# Patient Record
Sex: FEMALE | Race: WHITE | NOT HISPANIC OR LATINO | URBAN - METROPOLITAN AREA
[De-identification: names, ages, dates, MRNs, and addresses within clinical notes are randomized per-mention and may not be internally consistent; named-entity substitution may affect disease eponyms.]

---

## 2019-09-08 ENCOUNTER — EMERGENCY (EMERGENCY)
Facility: HOSPITAL | Age: 49
LOS: 1 days | Discharge: HOME | End: 2019-09-08
Attending: EMERGENCY MEDICINE | Admitting: EMERGENCY MEDICINE
Payer: COMMERCIAL

## 2019-09-08 VITALS
SYSTOLIC BLOOD PRESSURE: 139 MMHG | DIASTOLIC BLOOD PRESSURE: 97 MMHG | TEMPERATURE: 99 F | RESPIRATION RATE: 18 BRPM | HEART RATE: 91 BPM | OXYGEN SATURATION: 100 %

## 2019-09-08 DIAGNOSIS — O34.21 MATERNAL CARE FOR SCAR FROM PREVIOUS CESAREAN DELIVERY: Chronic | ICD-10-CM

## 2019-09-08 PROCEDURE — 99282 EMERGENCY DEPT VISIT SF MDM: CPT

## 2019-09-08 NOTE — ED PROVIDER NOTE - NSFOLLOWUPINSTRUCTIONS_ED_ALL_ED_FT
Thank you for visiting our Emergency Department, it has been a pleasure taking part in your healthcare. Please follow up with your primary doctor within x48 hours.    Your discharge diagnosis is: viral conjunctivitis     Please follow-up with your primary care doctor.     Return precautions to the Emergency Department include but are not limited to: unrelenting nausea, vomiting, fever, chills, chest pain, shortness of breath, dizziness, abdominal pain, worsening pain, syncope, blood in urine or stool, headache that doesn't resolve, numbness or tingling, loss of sensation, loss of motor function, or any other concerning symptoms.

## 2019-09-08 NOTE — ED ADULT TRIAGE NOTE - CHIEF COMPLAINT QUOTE
eye pain    pt did some gardening on Thursday.... since then having pain alternating from each eye, photophobia but vision intact.  nose running, eyes watery.

## 2019-09-08 NOTE — ED PROVIDER NOTE - PATIENT PORTAL LINK FT
You can access the FollowMyHealth Patient Portal offered by [facility] by registering at the following website: [email or web address]. By joining [facility]’s FollowMyHealth portal, you will also be able to view your health information using other applications (apps) compatible with our system.

## 2019-09-08 NOTE — ED PROVIDER NOTE - CLINICAL SUMMARY MEDICAL DECISION MAKING FREE TEXT BOX
43F, p.w b/l eye pain and photophobia for two days w. associated rhinorrhea and watery eye discharge. neg woods lamp exam, denied visions changes, fever, pain w. EMOI. likely viral conjunctivitis will follow-up with outpatient. Roger att: 44 yo F p/w b/l eye pain and photophobia for two days w. associated rhinorrhea and watery eye discharge. neg woods lamp exam, denied visions changes, fever, pain w. EMOI. likely viral conjunctivitis will follow-up with outpatient.

## 2019-09-08 NOTE — ED ADULT NURSE NOTE - OBJECTIVE STATEMENT
Receive pt. in Intake room 13 alert and oriented x 4 presenting to the Er complaining of redness and secretions in both eyes. Pt. has a history of HTN and stated " I was doing some gardening and cleaning up my daughter's grave on Thursday and my eyes started watering and burning then it became worse today'. No c/o pain, eyes with redness and clear yellow secretions, able to see without difficulty, will continue to monitor.

## 2019-09-08 NOTE — ED PROVIDER NOTE - NS ED ROS FT
GENERAL: No fever or chills, weight changes, nightsweats  EYES: HPI, no change in vision  HEENT: no dysplasia, odynophagia, ear pain, epistasis   CARDIAC: no chest pain, palpitation   PULMONARY: no cough or SOB  GI: no abdominal pain, no nausea or no vomiting, no diarrhea or constipation  : No changes in urination for pain/freq.   SKIN: no rashes   NEURO: no headache, numbness/tingling, extremity weakness   MSK: No joint pain

## 2019-09-08 NOTE — ED PROVIDER NOTE - PROGRESS NOTE DETAILS
Roberto Glynn, PGY 2: patient symptoms improved after Roberto Glynn, PGY 2: patient symptoms improved after tetracaine

## 2019-09-08 NOTE — ED PROVIDER NOTE - PHYSICAL EXAMINATION
General: NAD, good hygiene, well developed  HENT: Atraumatic, EMOI, b/l conjunctivae injection, neg for foreign body on wood lamp, moist mucosa, no post. oropharynx erythema, exudates  Cardiovascular: RRR, S1&2, no M or R, radial pulses equal and b/l  Respiratory: CTABL, no wheezes or crackles, no decreased breath sounds  Abdominal: soft and non-tender non distended, neg for guarding, mckinney's sign, rovsing's sign, mcburney's sign, no CVA tenderness   Extremities: no edema of the legs/feet  Skin: warm, well perfused  Neurologic: nonfocal, AAOx3  Psych: normal mood and affect

## 2019-09-12 ENCOUNTER — EMERGENCY (EMERGENCY)
Facility: HOSPITAL | Age: 49
LOS: 1 days | Discharge: HOME | End: 2019-09-12
Attending: EMERGENCY MEDICINE | Admitting: EMERGENCY MEDICINE
Payer: COMMERCIAL

## 2019-09-12 VITALS
SYSTOLIC BLOOD PRESSURE: 133 MMHG | DIASTOLIC BLOOD PRESSURE: 80 MMHG | HEART RATE: 88 BPM | RESPIRATION RATE: 18 BRPM | TEMPERATURE: 98 F | OXYGEN SATURATION: 100 %

## 2019-09-12 VITALS
TEMPERATURE: 99 F | HEART RATE: 98 BPM | SYSTOLIC BLOOD PRESSURE: 133 MMHG | DIASTOLIC BLOOD PRESSURE: 85 MMHG | OXYGEN SATURATION: 100 % | RESPIRATION RATE: 17 BRPM

## 2019-09-12 DIAGNOSIS — O34.21 MATERNAL CARE FOR SCAR FROM PREVIOUS CESAREAN DELIVERY: Chronic | ICD-10-CM

## 2019-09-12 LAB
ANION GAP SERPL CALC-SCNC: 14 MMO/L — SIGNIFICANT CHANGE UP (ref 7–14)
BUN SERPL-MCNC: 9 MG/DL — SIGNIFICANT CHANGE UP (ref 7–23)
CALCIUM SERPL-MCNC: 8.8 MG/DL — SIGNIFICANT CHANGE UP (ref 8.4–10.5)
CHLORIDE SERPL-SCNC: 103 MMOL/L — SIGNIFICANT CHANGE UP (ref 98–107)
CO2 SERPL-SCNC: 23 MMOL/L — SIGNIFICANT CHANGE UP (ref 22–31)
CREAT SERPL-MCNC: 1.11 MG/DL — SIGNIFICANT CHANGE UP (ref 0.5–1.3)
GLUCOSE SERPL-MCNC: 113 MG/DL — HIGH (ref 70–99)
HCT VFR BLD CALC: 31.7 % — LOW (ref 34.5–45)
HGB BLD-MCNC: 10.1 G/DL — LOW (ref 11.5–15.5)
MCHC RBC-ENTMCNC: 27 PG — SIGNIFICANT CHANGE UP (ref 27–34)
MCHC RBC-ENTMCNC: 31.9 % — LOW (ref 32–36)
MCV RBC AUTO: 84.8 FL — SIGNIFICANT CHANGE UP (ref 80–100)
NRBC # FLD: 0 K/UL — SIGNIFICANT CHANGE UP (ref 0–0)
PLATELET # BLD AUTO: 278 K/UL — SIGNIFICANT CHANGE UP (ref 150–400)
PMV BLD: 11.4 FL — SIGNIFICANT CHANGE UP (ref 7–13)
POTASSIUM SERPL-MCNC: 3.9 MMOL/L — SIGNIFICANT CHANGE UP (ref 3.5–5.3)
POTASSIUM SERPL-SCNC: 3.9 MMOL/L — SIGNIFICANT CHANGE UP (ref 3.5–5.3)
RBC # BLD: 3.74 M/UL — LOW (ref 3.8–5.2)
RBC # FLD: 14.6 % — HIGH (ref 10.3–14.5)
SODIUM SERPL-SCNC: 140 MMOL/L — SIGNIFICANT CHANGE UP (ref 135–145)
WBC # BLD: 10.98 K/UL — HIGH (ref 3.8–10.5)
WBC # FLD AUTO: 10.98 K/UL — HIGH (ref 3.8–10.5)

## 2019-09-12 PROCEDURE — 99284 EMERGENCY DEPT VISIT MOD MDM: CPT

## 2019-09-12 RX ORDER — METOCLOPRAMIDE HCL 10 MG
10 TABLET ORAL ONCE
Refills: 0 | Status: COMPLETED | OUTPATIENT
Start: 2019-09-12 | End: 2019-09-12

## 2019-09-12 RX ORDER — KETOROLAC TROMETHAMINE 30 MG/ML
15 SYRINGE (ML) INJECTION ONCE
Refills: 0 | Status: DISCONTINUED | OUTPATIENT
Start: 2019-09-12 | End: 2019-09-12

## 2019-09-12 RX ORDER — SODIUM CHLORIDE 9 MG/ML
1000 INJECTION INTRAMUSCULAR; INTRAVENOUS; SUBCUTANEOUS ONCE
Refills: 0 | Status: COMPLETED | OUTPATIENT
Start: 2019-09-12 | End: 2019-09-12

## 2019-09-12 RX ADMIN — Medication 10 MILLIGRAM(S): at 06:49

## 2019-09-12 RX ADMIN — SODIUM CHLORIDE 1000 MILLILITER(S): 9 INJECTION INTRAMUSCULAR; INTRAVENOUS; SUBCUTANEOUS at 06:49

## 2019-09-12 RX ADMIN — Medication 15 MILLIGRAM(S): at 06:49

## 2019-09-12 NOTE — ED ADULT NURSE NOTE - OBJECTIVE STATEMENT
pt a&Ox3, c/o n/v, eye pain, photobia since monday. pt was seen here a few days ago and discharged with viral conjunctivitis. pt states she has been vomiting froth, pt breathing even & unlabored, VSS, awaiting MD evaluation, will continue to monitor.

## 2019-09-12 NOTE — ED PROVIDER NOTE - PATIENT PORTAL LINK FT
You can access the FollowMyHealth Patient Portal offered by [facility] by registering at the following website: [email or web address]. By joining [facility]’s FollowMyHealth portal, you will also be able to view your health information using other applications (apps) compatible with our system.

## 2019-09-12 NOTE — ED PROVIDER NOTE - PHYSICAL EXAMINATION
General: Alert and Oriented. No apparent distress.  Head: Normocephalic and atraumatic.  Eyes: PERRLA with EOMI.  Neck: Supple. Trachea midline.   Cardiac: Normal S1 and S2 w/ RRR. No murmurs appreciated.   Pulmonary: Vesicular breath sounds bilaterally. No increased WOB. No wheezes or crackles.  Abdominal: Soft, non-tender. Normoactive bowel sounds.  Neurologic: A&O x3. CN 2-12 intact. Strength 5/5 throughout b/l UE and LE. Sensation intact to light touch throughout b/l UE and LE. Finger to nose intact. Gait wnl.   Psychiatric: Appropriate mood and affect. No apparent risk to self or others.

## 2019-09-12 NOTE — ED PROVIDER NOTE - CLINICAL SUMMARY MEDICAL DECISION MAKING FREE TEXT BOX
49 F presentign with headache, abdominal pain. no focal fidnings on exam, VS wnl less likely to suggest serious acute pathology. Likely due to viral illness vs migraine causing constellation of symptoms. Will obtain basic labwork, give medications/IVF, reassess

## 2019-09-12 NOTE — ED PROVIDER NOTE - NSFOLLOWUPINSTRUCTIONS_ED_ALL_ED_FT
Take Excedrin every 6 hours as needed to control headache.  Follow up with your primary care provider in 24-48 hours.   Please return to the Emergency Department if you experience any new/worsening symptoms, including but are not limited to: unrelenting nausea, vomiting, fever, chills, headache that does not resolve, numbness or tingling, loss of sensation, loss of motor function, or any other concerning symptoms.

## 2019-09-12 NOTE — ED PROVIDER NOTE - ATTENDING CONTRIBUTION TO CARE
50 y/o F with h/o migraines, HTN here with right sided headache.  Pt reports worsening headache since Saturday.  Initially intermittent, but worsening and becoming constant this morning.  Pain mostly behind right eye and radiates to back of head.  (+)nausea, nbnb vomiting, photophobia.  No fever, chills. vision changes, neck pain or stiffness, uri sxs, weakness, abd pain, diarrhea.  Well appearing, lying in stretcher, awake and alert, nontoxic.  VSS.  NCAT EOMI PERRL.  No temporal tenderness.  Neck supple.  Lungs cta bl.  Cards nl S1/S2, RRR, no MRG.  Abd soft ntnd.  No focal neuro deficits.  Plan for labs, reglan/toradol, ivfs, reassess.  Likely migraine, no neuro deficits or concerning features, will reassess after meds.

## 2019-09-12 NOTE — ED ADULT NURSE REASSESSMENT NOTE - NS ED NURSE REASSESS COMMENT FT1
receiving pt from night RN. pt aox3. pt reports headache and hot flashes. pt afebrile upon assessment. pt denies chest pain, sob, n/v/d.

## 2019-09-12 NOTE — ED ADULT TRIAGE NOTE - CHIEF COMPLAINT QUOTE
Pt c/o worsening eye pain, worse on right eye. Also reports h/a, photophobia. Reports was seen in ED on Sunday and discharged with viral conjunctivitis. Pt also endorses abd pain, nausea, x6 episodes of vomiting, fever since last night. Also c/o diaphoresis throughout the day. Denies diarrhea. PMHx htn

## 2019-09-12 NOTE — ED PROVIDER NOTE - OBJECTIVE STATEMENT
50 yo F hx migraines, HTN on amlodpine lisinopril and labetalol pw R sided headache and nausea, vomiting, for 2 days. Recently seen dx w/ viral conjunctivitis since then has developed vomiting and HA, decreased PO intake. Has been taking excedrin with minimal effect. Notes she suffered from migraines in the past not for 1 year, headache similar to prior migraines. Denies associated fevers, neck stiffness, diarrhea, dyusria/frequency focal numbness or weaknesss

## 2019-09-12 NOTE — ED PROVIDER NOTE - PROGRESS NOTE DETAILS
Latesha Roland MD: Labs with mild leukocytosis unlikely to be infectious in nature. Pt is afebrile, well appearing on my assessment. PO challenged. HA resolved. Will d/c home. Follow up instructions given, importance of follow up emphasized, return to ED parameters reviewed and patient verbalized understanding.  All questions answered, all concerns addressed.

## 2019-09-17 ENCOUNTER — APPOINTMENT (OUTPATIENT)
Dept: OPHTHALMOLOGY | Facility: CLINIC | Age: 49
End: 2019-09-17

## 2019-09-17 ENCOUNTER — NON-APPOINTMENT (OUTPATIENT)
Age: 49
End: 2019-09-17

## 2019-09-17 ENCOUNTER — INPATIENT (INPATIENT)
Facility: HOSPITAL | Age: 49
LOS: 7 days | Discharge: HOME | End: 2019-09-25
Attending: HOSPITALIST | Admitting: HOSPITALIST
Payer: COMMERCIAL

## 2019-09-17 ENCOUNTER — APPOINTMENT (OUTPATIENT)
Dept: OPHTHALMOLOGY | Facility: CLINIC | Age: 49
End: 2019-09-17
Payer: COMMERCIAL

## 2019-09-17 VITALS
RESPIRATION RATE: 16 BRPM | OXYGEN SATURATION: 100 % | SYSTOLIC BLOOD PRESSURE: 138 MMHG | HEART RATE: 107 BPM | DIASTOLIC BLOOD PRESSURE: 86 MMHG

## 2019-09-17 DIAGNOSIS — O34.21 MATERNAL CARE FOR SCAR FROM PREVIOUS CESAREAN DELIVERY: Chronic | ICD-10-CM

## 2019-09-17 DIAGNOSIS — R04.2 HEMOPTYSIS: ICD-10-CM

## 2019-09-17 LAB
ALBUMIN SERPL ELPH-MCNC: 4 G/DL — SIGNIFICANT CHANGE UP (ref 3.3–5)
ALP SERPL-CCNC: 139 U/L — HIGH (ref 40–120)
ALT FLD-CCNC: 24 U/L — SIGNIFICANT CHANGE UP (ref 4–33)
ANION GAP SERPL CALC-SCNC: 13 MMO/L — SIGNIFICANT CHANGE UP (ref 7–14)
APTT BLD: 49 SEC — HIGH (ref 27.5–36.3)
AST SERPL-CCNC: 21 U/L — SIGNIFICANT CHANGE UP (ref 4–32)
BASE EXCESS BLDV CALC-SCNC: 0.6 MMOL/L — SIGNIFICANT CHANGE UP
BASOPHILS # BLD AUTO: 0.09 K/UL — SIGNIFICANT CHANGE UP (ref 0–0.2)
BASOPHILS NFR BLD AUTO: 0.7 % — SIGNIFICANT CHANGE UP (ref 0–2)
BILIRUB SERPL-MCNC: 0.3 MG/DL — SIGNIFICANT CHANGE UP (ref 0.2–1.2)
BLOOD GAS VENOUS - CREATININE: 1.68 MG/DL — HIGH (ref 0.5–1.3)
BLOOD GAS VENOUS - FIO2: 21 — SIGNIFICANT CHANGE UP
BUN SERPL-MCNC: 14 MG/DL — SIGNIFICANT CHANGE UP (ref 7–23)
CALCIUM SERPL-MCNC: 9.2 MG/DL — SIGNIFICANT CHANGE UP (ref 8.4–10.5)
CHLORIDE BLDV-SCNC: 107 MMOL/L — SIGNIFICANT CHANGE UP (ref 96–108)
CHLORIDE SERPL-SCNC: 99 MMOL/L — SIGNIFICANT CHANGE UP (ref 98–107)
CO2 SERPL-SCNC: 24 MMOL/L — SIGNIFICANT CHANGE UP (ref 22–31)
CREAT SERPL-MCNC: 1.8 MG/DL — HIGH (ref 0.5–1.3)
CRP SERPL-MCNC: 29.7 MG/L — HIGH
EOSINOPHIL # BLD AUTO: 0.16 K/UL — SIGNIFICANT CHANGE UP (ref 0–0.5)
EOSINOPHIL NFR BLD AUTO: 1.3 % — SIGNIFICANT CHANGE UP (ref 0–6)
ERYTHROCYTE [SEDIMENTATION RATE] IN BLOOD: 98 MM/HR — HIGH (ref 4–25)
GAS PNL BLDV: 134 MMOL/L — LOW (ref 136–146)
GLUCOSE BLDV-MCNC: 112 MG/DL — HIGH (ref 70–99)
GLUCOSE SERPL-MCNC: 118 MG/DL — HIGH (ref 70–99)
HCO3 BLDV-SCNC: 25 MMOL/L — SIGNIFICANT CHANGE UP (ref 20–27)
HCT VFR BLD CALC: 32.4 % — LOW (ref 34.5–45)
HCT VFR BLDV CALC: 31.7 % — LOW (ref 34.5–45)
HGB BLD-MCNC: 10.2 G/DL — LOW (ref 11.5–15.5)
HGB BLDV-MCNC: 10.2 G/DL — LOW (ref 11.5–15.5)
IMM GRANULOCYTES NFR BLD AUTO: 0.5 % — SIGNIFICANT CHANGE UP (ref 0–1.5)
INR BLD: 1.11 — SIGNIFICANT CHANGE UP (ref 0.88–1.17)
LACTATE BLDV-MCNC: 0.9 MMOL/L — SIGNIFICANT CHANGE UP (ref 0.5–2)
LYMPHOCYTES # BLD AUTO: 1.43 K/UL — SIGNIFICANT CHANGE UP (ref 1–3.3)
LYMPHOCYTES # BLD AUTO: 11.9 % — LOW (ref 13–44)
MCHC RBC-ENTMCNC: 26.2 PG — LOW (ref 27–34)
MCHC RBC-ENTMCNC: 31.5 % — LOW (ref 32–36)
MCV RBC AUTO: 83.3 FL — SIGNIFICANT CHANGE UP (ref 80–100)
MONOCYTES # BLD AUTO: 1.02 K/UL — HIGH (ref 0–0.9)
MONOCYTES NFR BLD AUTO: 8.5 % — SIGNIFICANT CHANGE UP (ref 2–14)
NEUTROPHILS # BLD AUTO: 9.3 K/UL — HIGH (ref 1.8–7.4)
NEUTROPHILS NFR BLD AUTO: 77.1 % — HIGH (ref 43–77)
NRBC # FLD: 0 K/UL — SIGNIFICANT CHANGE UP (ref 0–0)
PCO2 BLDV: 32 MMHG — LOW (ref 41–51)
PH BLDV: 7.48 PH — HIGH (ref 7.32–7.43)
PLATELET # BLD AUTO: 361 K/UL — SIGNIFICANT CHANGE UP (ref 150–400)
PMV BLD: 10.1 FL — SIGNIFICANT CHANGE UP (ref 7–13)
PO2 BLDV: 91 MMHG — HIGH (ref 35–40)
POTASSIUM BLDV-SCNC: 3.7 MMOL/L — SIGNIFICANT CHANGE UP (ref 3.4–4.5)
POTASSIUM SERPL-MCNC: 3.7 MMOL/L — SIGNIFICANT CHANGE UP (ref 3.5–5.3)
POTASSIUM SERPL-SCNC: 3.7 MMOL/L — SIGNIFICANT CHANGE UP (ref 3.5–5.3)
PROT SERPL-MCNC: 8.5 G/DL — HIGH (ref 6–8.3)
PROTHROM AB SERPL-ACNC: 12.4 SEC — SIGNIFICANT CHANGE UP (ref 9.8–13.1)
RBC # BLD: 3.89 M/UL — SIGNIFICANT CHANGE UP (ref 3.8–5.2)
RBC # FLD: 14.4 % — SIGNIFICANT CHANGE UP (ref 10.3–14.5)
SAO2 % BLDV: 97.3 % — HIGH (ref 60–85)
SODIUM SERPL-SCNC: 136 MMOL/L — SIGNIFICANT CHANGE UP (ref 135–145)
WBC # BLD: 12.06 K/UL — HIGH (ref 3.8–10.5)
WBC # FLD AUTO: 12.06 K/UL — HIGH (ref 3.8–10.5)

## 2019-09-17 PROCEDURE — 71046 X-RAY EXAM CHEST 2 VIEWS: CPT | Mod: 26

## 2019-09-17 PROCEDURE — 99223 1ST HOSP IP/OBS HIGH 75: CPT

## 2019-09-17 PROCEDURE — 93010 ELECTROCARDIOGRAM REPORT: CPT

## 2019-09-17 PROCEDURE — 99201 OFFICE OUTPATIENT NEW 10 MINUTES: CPT

## 2019-09-17 RX ORDER — CEFTRIAXONE 500 MG/1
1000 INJECTION, POWDER, FOR SOLUTION INTRAMUSCULAR; INTRAVENOUS ONCE
Refills: 0 | Status: DISCONTINUED | OUTPATIENT
Start: 2019-09-17 | End: 2019-09-17

## 2019-09-17 RX ORDER — ONDANSETRON 8 MG/1
4 TABLET, FILM COATED ORAL ONCE
Refills: 0 | Status: COMPLETED | OUTPATIENT
Start: 2019-09-17 | End: 2019-09-17

## 2019-09-17 RX ORDER — AZITHROMYCIN 500 MG/1
500 TABLET, FILM COATED ORAL ONCE
Refills: 0 | Status: COMPLETED | OUTPATIENT
Start: 2019-09-17 | End: 2019-09-17

## 2019-09-17 RX ORDER — CEFTRIAXONE 500 MG/1
1000 INJECTION, POWDER, FOR SOLUTION INTRAMUSCULAR; INTRAVENOUS ONCE
Refills: 0 | Status: COMPLETED | OUTPATIENT
Start: 2019-09-17 | End: 2019-09-17

## 2019-09-17 RX ORDER — ACETAMINOPHEN 500 MG
650 TABLET ORAL ONCE
Refills: 0 | Status: COMPLETED | OUTPATIENT
Start: 2019-09-17 | End: 2019-09-17

## 2019-09-17 RX ORDER — INFLUENZA VIRUS VACCINE 15; 15; 15; 15 UG/.5ML; UG/.5ML; UG/.5ML; UG/.5ML
0.5 SUSPENSION INTRAMUSCULAR ONCE
Refills: 0 | Status: DISCONTINUED | OUTPATIENT
Start: 2019-09-17 | End: 2019-09-25

## 2019-09-17 RX ORDER — SODIUM CHLORIDE 9 MG/ML
1000 INJECTION INTRAMUSCULAR; INTRAVENOUS; SUBCUTANEOUS
Refills: 0 | Status: COMPLETED | OUTPATIENT
Start: 2019-09-17 | End: 2019-09-17

## 2019-09-17 RX ADMIN — Medication 650 MILLIGRAM(S): at 22:58

## 2019-09-17 RX ADMIN — AZITHROMYCIN 500 MILLIGRAM(S): 500 TABLET, FILM COATED ORAL at 18:45

## 2019-09-17 RX ADMIN — SODIUM CHLORIDE 2000 MILLILITER(S): 9 INJECTION INTRAMUSCULAR; INTRAVENOUS; SUBCUTANEOUS at 20:10

## 2019-09-17 RX ADMIN — Medication 650 MILLIGRAM(S): at 21:59

## 2019-09-17 RX ADMIN — CEFTRIAXONE 100 MILLIGRAM(S): 500 INJECTION, POWDER, FOR SOLUTION INTRAMUSCULAR; INTRAVENOUS at 18:45

## 2019-09-17 RX ADMIN — Medication 650 MILLIGRAM(S): at 18:45

## 2019-09-17 RX ADMIN — ONDANSETRON 4 MILLIGRAM(S): 8 TABLET, FILM COATED ORAL at 18:44

## 2019-09-17 NOTE — ED ADULT NURSE NOTE - OBJECTIVE STATEMENT
Pt received to intake AAO x 3 c/o vomiting last night and coughing up BRB today. Pt denies pain, no fever and states PMH htn. Labs sent and 18G placed to the left AC, eval in progress.

## 2019-09-17 NOTE — H&P ADULT - NSHPLABSRESULTS_GEN_ALL_CORE
EKG personally reviewed.    Imaging personally reviewed.   Xray Chest 2 Views PA/Lat (09.17.19 @ 15:06)     IMPRESSION:  Clear lungs. No pleural effusions or pneumothorax.    Cardiac and mediastinal silhouettes within normal limits.    Trachea midline.    Trace scoliotic spinal curvature. Unremarkable remaining visualized   osseous structures.

## 2019-09-17 NOTE — ED PROVIDER NOTE - CHIEF COMPLAINT
The patient is a 49y Female complaining of The patient is a 49y Female complaining of blood in sputum

## 2019-09-17 NOTE — ED PROVIDER NOTE - OBJECTIVE STATEMENT
49y F w/ PMHx HTN, Migraine headaches, presenting with blood in her sputum w/ onset two hours ago while at her Opthalmologic appointment. Patient states she began coughing and noticed bright red blood in her sputum mixed with mucous, then came directly to ED. 49y F w/ PMHx HTN, Migraine headaches, presenting with blood in her sputum w/ onset two hours ago while at her Opthalmologic appointment. Patient states she began coughing and noticed bright red blood in her sputum mixed with mucous, then came directly to ED. Patient endorses she began feeling nauseous yesterday around 4pm and had multiple episodes of NBNB vomiting with retching. This morning patient was able to tolerate PO, denies nausea today or episodes of vomiting. Patient was being evaluated this morning with Opthalmology, she claims she has had persistent viral conjunctivitis since being treated here in the ED on 9/8. Patient was told today at appointment that her eye symptoms may be autoimmune in nature and was prescribed steroids to start taking today. Also endorsing "sweating through my clothes and bed sheets" since the 8th w/ associated chills. Denies fever, abdominal pain, hematochezia, melena, dysuria, hematuria, previous episodes of hemoptysis, recent travel, sick contacts, weight loss.    While examining patient, she began coughing up bright red blood mixed with mucous.

## 2019-09-17 NOTE — H&P ADULT - NSICDXPASTMEDICALHX_GEN_ALL_CORE_FT
PAST MEDICAL HISTORY:  HTN (hypertension) PAST MEDICAL HISTORY:  HTN (hypertension)     Migraine headache

## 2019-09-17 NOTE — H&P ADULT - PROBLEM SELECTOR PLAN 4
Cr on admission 1.8 vs. 1.11 on 9/12/19. BUN 14. Unclear etiology, but can be component of prerenal from recent vomiting episodes and possible autoimmune disorder/vasculitis.   - Workup as above for Hemoptysis and Uveitis  - Check urine lytes  - Given KAN with anemia and protein gap, will check UPEP, SPEP, serum immunofixation, serum FLC, and quant immunoglobulins for possible multiple myeloma  - Monitor Cr and UOP  - Avoid NSAIDs, ACEi/ARBs, contrast, nephrotoxic agents. Will hold pt's home losartan.  - Renally dose meds

## 2019-09-17 NOTE — H&P ADULT - PROBLEM SELECTOR PLAN 7
- DVT ppx: Hold pharmacologic ppx given recent hemoptysis  - Diet: NPO after MN for possible hemoptysis workup - DVT ppx: Hold pharmacologic ppx given recent hemoptysis  - Diet: NPO after MN for possible procedure for hemoptysis workup

## 2019-09-17 NOTE — H&P ADULT - PROBLEM SELECTOR PLAN 5
Hgb 10.2 on admission in setting of hemoptysis vs. 10.1 on 9/12/19. Per pt, has h/o anemia and has been on folic acid supplements as a result.   - Monitor H/H and transfuse pRBCs to keep Hgb > 7  - C/w folic acid 1 mg daily  - Keep active type and screen  - Multiple myeloma workup as noted in plan for KAN  - Check iron studies, folate, vitamin B12

## 2019-09-17 NOTE — H&P ADULT - NSHPSOCIALHISTORY_GEN_ALL_CORE
Tobacco: denies  EtOH: denies  Illicit drugs: denies  Lives with Tobacco: denies  EtOH: denies  Illicit drugs: denies  Lives with her 3 children. Works for the [employer].

## 2019-09-17 NOTE — H&P ADULT - NSHPREVIEWOFSYSTEMS_GEN_ALL_CORE
Constitutional: No weakness, fevers, chills, or weight loss  Eyes: No visual changes, double vision, or eye pain  Ears, Nose, Mouth, Throat: No runny nose, sinus pain, ear pain, tinnitus, sore throat, dysphagia, or odynophagia  Cardiovascular: No chest pain, palpitations, or LE edema  Respiratory: No cough, wheezing, hemoptysis, or shortness of breath  Gastrointestinal: No abdominal pain, dysphagia, anorexia, nausea/vomiting, diarrhea/constipation, hematemesis, BRBPR, or melena  Genitourinary: No dysuria, frequency, urgency, or hematuria  Musculoskeletal: No joint pain, joint swelling, or decreased ROM  Skin: No pruritus, rashes, lesions, or wounds  Neurologic:  No seizures, headache, paresthesias, numbness, or limb weakness  Psychiatric: No depression, anxiety, difficulty concentrating, anhedonia, or lack of energy  Endocrine: No heat/cold intolerance, mood swings, sweats, polydipsia, or polyuria  Hematologic/lymphatic: No purpura, petechia, or prolonged or excessive bleeding after dental extraction / injury  Allergic/Immunologic: No anaphylaxis or allergic response to materials, foods, animals    Positives and pertinent negatives noted and all other systems negative. Constitutional: +Fevers/chills. No weakness or weight loss.  Eyes: +B/l eye pain, redness, tearing, and photophobia. No visual changes or double vision.  Ears, Nose, Mouth, Throat: +Runny nose (now resolved). No sinus pain, ear pain, tinnitus, sore throat, dysphagia, or odynophagia.  Cardiovascular: No chest pain, palpitations, or LE edema  Respiratory: +Hemoptysis. No wheezing or shortness of breath.  Gastrointestinal: +Nausea/vomiting (now resolved). No abdominal pain, diarrhea/constipation, hematemesis, BRBPR, or melena.  Genitourinary: No dysuria, frequency, urgency, or hematuria  Musculoskeletal: No joint pain, joint swelling, or decreased ROM  Skin: No pruritus or rashes  Neurologic: +Chronic migraine headache. No seizures, paresthesias, numbness, or limb weakness.  Psychiatric: No depression, anxiety, or agitation  Endocrine: No heat/cold intolerance, mood swings, sweats, polydipsia, or polyuria  Hematologic/lymphatic: No purpura, petechia, or prolonged or excessive bleeding after dental extraction / injury  Allergic/Immunologic: No anaphylaxis or allergic response to materials, foods, animals    Positives and pertinent negatives noted and all other systems negative.

## 2019-09-17 NOTE — H&P ADULT - PROBLEM SELECTOR PLAN 6
BPs in acceptable range.   - Will hold pt's home losartan for now given KAN  - Will hold pt's home amlodipine for now given recent hemoptysis  - C/w labetalol 100 mg bid with hold parameters

## 2019-09-17 NOTE — ED PROVIDER NOTE - PROGRESS NOTE DETAILS
Patient became febrile to 100.4, elevated wbc count, will obtain sepsis labs and treat with Tylenol and Zofran for nausea, will admit to medicine.   Suad Kennedy D.O. (PGY-1) Dr. Gallagher: New fever: plan for antibiotic coverage, cultures, fluid bolus. Sepsis Reassessment Note: Patient tacycardic, elevated white count, febrile, will order lactate, start IVF  Capillary refill <2sec  Lungs are CTAB  Skin is warm and dry   Suad Kennedy D.O. (PGY-1)

## 2019-09-17 NOTE — ED PROVIDER NOTE - NS ED ROS FT
CONSTITUTIONAL: +chills, no fevers, no lightheadedness, no dizziness  Eyes: b/l eye tearing, pruritis, photosensitivity   Ears: no ear drainage, no ear pain  Nose: no nasal congestion  Mouth/Throat: no sore throat  CV: No chest pain, no palpitations  PULM: +cough, no SOB  GI: +n/v, no diarrhea, melena, hematochezia, no abd pain  : no dysuria, no hematuria  SKIN: no rashes  NEURO: no headache, no focal weakness or numbness   PSYCHIATRIC: no known mental health issues

## 2019-09-17 NOTE — H&P ADULT - PROBLEM SELECTOR PLAN 2
Pt with diagnosis of iritis and anterior uveitis by ophthalmology on Tuesday and prescribed Prednisolone eye drops.  - C/w Prednisolone 1% eye drops qid (prescription confirmed via ophthalmology office note in HIE)  - As anterior uveitis associated with autoimmune conditions, such as sarcoidosis, SLE, GPA, Goodpasture's, Behcet's, other vasculitides, IBD, and rheumatoid arthritis. Will check JULI, anti-dsDNA, C3/C4, c-ANCA, p-ANCA, RF, and anti-CCP.  - Rheumatology consult in AM  - Will consider GI consult either as inpatient or outpatient for possible workup of IBD Pt with diagnosis of iritis and anterior uveitis by ophthalmology on Tuesday and prescribed Prednisolone eye drops.  - C/w Prednisolone 1% eye drops qid (prescription confirmed via ophthalmology office note in HIE)  - As anterior uveitis associated with autoimmune conditions, such as sarcoidosis, SLE, GPA, Goodpasture's, Behcet's, other vasculitides, IBD, and rheumatoid arthritis. Will check JULI, anti-dsDNA, C3/C4, c-ANCA, p-ANCA, RF, and anti-CCP.  - Rheumatology consult in AM  - GI consult in AM for possible workup of IBD Pt with diagnosis of iritis and anterior uveitis by ophthalmology on Tuesday and prescribed Prednisolone eye drops.  - C/w Prednisolone 1% eye drops qid (prescription confirmed via ophthalmology office note in HIE)  - As anterior uveitis associated with autoimmune conditions, such as sarcoidosis, SLE, GPA, Goodpasture's, Behcet's, other vasculitides, IBD, and rheumatoid arthritis. Will check JULI, anti-dsDNA, C3/C4, c-ANCA, p-ANCA, RF, and anti-CCP.  - Rheumatology consult in AM  - GI consult in AM for possible workup of IBD as inpatient  - Pain control with Tylenol PRN for moderate pain and Oxycodone IR 5 mg q6hrs PRN for severe pain

## 2019-09-17 NOTE — ED PROVIDER NOTE - CLINICAL SUMMARY MEDICAL DECISION MAKING FREE TEXT BOX
49y F w/ PMHx HTN, Migraine headaches, presenting with blood in her sputum w/ onset two hours ago after 1 day of nausea and multiple episodes of NBNB vomiting. Likely mucosal tear s/p retching, not likely bronchitis, bronchiectasis or lung cancer, no recent unexplained weightloss, prolonged cough, wheezing, SOB, fevers. Will order labs, CXR and reassess. 49y F w/ PMHx HTN, Migraine headaches, presenting with blood in her sputum w/ onset two hours ago after 1 day of nausea and multiple episodes of NBNB vomiting. Likely mucosal tear s/p retching vs. vasculitis in setting of autoimmune dx, not likely bronchitis, bronchiectasis or lung cancer, no recent unexplained weightloss, prolonged cough, wheezing, SOB, fevers. Will order labs, CXR and reassess. 49y F w/ PMHx HTN, Migraine headaches, presenting with blood in her sputum w/ onset two hours ago after 1 day of nausea and multiple episodes of NBNB vomiting. Likely mucosal tear s/p retching vs. vasculitis in setting of autoimmune dx, not likely bronchitis, bronchiectasis or lung cancer, no recent unexplained weightloss, prolonged cough, wheezing, SOB, fevers. Will order labs, CXR and reassess.    Dr. Gallagher: I agree with the above provided history and exam and addend/modify it as follows.  49 F hx HTN, Migraines, conjunctivitis under evaluation in the outpatient setting p/W non-massive hemoptysis present x 1 day.  Notes preceding URI with sinusitis several weeks prior.  Denies fever/chills.  No cough in the immediately preceding days.  No recent travel, no prior DVT or PE, not hypoxemic, no chest pain or SOB: unlikely PE.  Labs demonstrate new KAN.  Concern for vasculitis.  plan for labs, CXR, anticipate admission.

## 2019-09-17 NOTE — H&P ADULT - NSICDXPASTSURGICALHX_GEN_ALL_CORE_FT
PAST SURGICAL HISTORY:  Delivery with history of  PAST SURGICAL HISTORY:  Delivery with history of      History of rotator cuff surgery

## 2019-09-17 NOTE — H&P ADULT - NSHPPHYSICALEXAM_GEN_ALL_CORE
Vital Signs Last 24 Hrs  T(C): 36.7 (17 Sep 2019 21:23), Max: 38 (17 Sep 2019 17:49)  T(F): 98.1 (17 Sep 2019 21:23), Max: 100.4 (17 Sep 2019 17:49)  HR: 84 (17 Sep 2019 21:23) (84 - 107)  BP: 135/73 (17 Sep 2019 21:23) (133/90 - 149/87)  BP(mean): --  RR: 18 (17 Sep 2019 21:23) (16 - 18)  SpO2: 100% (17 Sep 2019 21:23) (97% - 100%)    PHYSICAL EXAM:  General: Awake and alert.  No acute distress.  HEENT: Normocephalic, atraumatic.  PERRL.  EOMI.  No scleral icterus.  Moist MM.  No oropharyngeal exudates.    Neck: Supple.  Full range of motion.  No JVD.  No carotid bruits.  No thyromegaly.  Trachea midline.  No lymphadenopathy.   Heart: RRR.  Normal S1 and S2.  No murmurs, rubs, or gallops.  No LE edema b/l.   Lungs: Good inspiratory effort.  Nonlabored breathing.  CTAB.  No wheezes, crackles, or rhonchi.    Abdomen: BS+, soft, NT/ND.  No organomegaly.  Skin: Warm and dry.  No rashes.  Extremities: No clubbing or cyanosis.  2+ peripheral pulses b/l.  Musculoskeletal: No joint deformities.  No spinal or paraspinal tenderness.  Neuro: A&Ox3.  CN II-XII intact.  5/5 motor strength in UE and LE b/l.  Tactile sensation intact in UE and LE b/l.  Cerebellar function intact as assessed by finger-to-nose test. Vital Signs Last 24 Hrs  T(C): 36.7 (17 Sep 2019 21:23), Max: 38 (17 Sep 2019 17:49)  T(F): 98.1 (17 Sep 2019 21:23), Max: 100.4 (17 Sep 2019 17:49)  HR: 84 (17 Sep 2019 21:23) (84 - 107)  BP: 135/73 (17 Sep 2019 21:23) (133/90 - 149/87)  BP(mean): --  RR: 18 (17 Sep 2019 21:23) (16 - 18)  SpO2: 100% (17 Sep 2019 21:23) (97% - 100%)    PHYSICAL EXAM:  General: Awake and alert.  No acute distress.  Head: Normocephalic, atraumatic.    Eyes: PERRL.  EOMI without any pain elicited.  Mildly injected sclera.  Mouth: Moist MM.  No oropharyngeal exudates.    Neck: Supple.  Full range of motion.  No JVD.  ?R thyroid nodule.  Trachea midline.  No lymphadenopathy.   Heart: RRR.  Normal S1 and S2.  No murmurs, rubs, or gallops.  No LE edema b/l.   Lungs: Good inspiratory effort.  Nonlabored breathing.  CTAB.  No wheezes, crackles, or rhonchi.    Abdomen: BS+, soft, NT/ND.    Skin: Warm and dry.  No rashes.  Extremities: No cyanosis.  2+ peripheral pulses b/l.  Musculoskeletal: No joint deformities.  No spinal or paraspinal tenderness.  Neuro: A&Ox3.  CN II-XII intact.  5/5 motor strength in UE and LE b/l.  Tactile sensation intact in UE and LE b/l.  No focal deficits.

## 2019-09-17 NOTE — ED PROVIDER NOTE - PHYSICAL EXAMINATION
Physical Exam:  Gen: NAD, AOx3, non-toxic appearing, able to ambulate without assistance  Head: NCAT  HEENT: PEERL, erythematous conjunctiva w/ watery discharge, tongue midline, oral mucosa moist, no blood in oropharynx   Lung: CTAB, no respiratory distress, no wheezes/rhonchi/rales B/L, speaking in full sentences  CV: RRR, no murmurs, rubs or gallops  Abd: soft, NT, ND, no guarding, no rigidity  MSK: no visible deformities, ROM normal in UE/LE, no back pain  Neuro: No focal sensory or motor deficits  Skin: Warm, well perfused, no rash, no leg swelling  Psych: normal affect, calm

## 2019-09-17 NOTE — ED ADULT TRIAGE NOTE - CHIEF COMPLAINT QUOTE
Patient states she coughed today and had blood in her sputum, states she was vomiting all last night.

## 2019-09-17 NOTE — H&P ADULT - HISTORY OF PRESENT ILLNESS
50 yo woman with history of HTN and migraine headaches (well controlled for past 2 years) presents with new onset of hemoptysis while pt was at her ophthalmologist's office on Tuesday. Pt had an appointment with ophthalmology on Tuesday due to b/l eye pain, redness, tearing, and photophobia that pt has been dealing with since 9/7/19. Pt came to [facility] ED for those symptoms on 9/8/19 and was diagnosed with acute viral conjunctivitis, 48 yo woman with history of HTN and migraine headaches (well controlled for past 2 years) presents with new onset of hemoptysis while pt was at her ophthalmologist's office on Tuesday. Pt had an appointment with ophthalmology due to unresolving b/l eye pain, redness, tearing, and photophobia that pt has had since 9/7/19. Pt initially came to [facility] ED on 9/8/19 for those eye symptoms along with rhinorrhea and was diagnosed with acute viral conjunctivitis, for which pt was prescribed Trimethoprim and Polymyxin B eye drops and Flonase. However, pt's symptoms failed to improve, with pt returning to the ED on 9/12/19 for migraine-like headaches, nausea, and NBNB vomiting, in addition to the persistent b/l eye pain, though pt was subsequently discharged home from the ED after pt felt better with pain meds. Pt was eventually referred to ophthalmology by her PCP and had her first visit on Tuesday. At the visit, pt was informed that her eye symptoms were not due to conjunctivitis 50 yo woman with history of HTN and migraine headaches (well controlled for past 2 years) presents with new onset of hemoptysis while pt was at her ophthalmologist's office on Tuesday. Pt had an appointment with ophthalmology due to unresolving b/l eye pain, redness, tearing, and photophobia that pt has had since 9/7/19. Pt initially came to [facility] ED on 9/8/19 for those eye symptoms along with rhinorrhea and was diagnosed with acute viral conjunctivitis, for which pt was prescribed Trimethoprim and Polymyxin B eye drops and Flonase. However, pt's symptoms failed to improve, with pt returning to the ED on 9/12/19 for migraine-like headaches, nausea, and NBNB vomiting, in addition to the persistent b/l eye pain, though pt was subsequently discharged home from the ED after pt felt better with pain meds. Pt was eventually referred to ophthalmology by her PCP and had her first visit on Tuesday. At the visit, pt was informed that her eye symptoms were not due to conjunctivitis but from iritis and anterior uveitis, concerning for an autoimmune disorder, with pt prescribed Prednisolone eye drops. While pt was standing by the reception desk following her appointment, pt started coughing, bringing up sputum mixed with bright red blood. Pt had 3 more episodes of hemoptysis in the ED, each time bringing up a dime-sized amount of bright red blood. Pt denies any associated CP, SOB, palpitations, or LE edema, erythema, or pain but reports that shortly after onset of her eye symptoms on 9/7/19, pt had intense episodes of fevers, chills, and night sweats, soaking her sheets. Pt also notes that she had severe nausea with ~8 episodes of NBNB vomiting from Monday afternoon to Tuesday morning, resolving just prior to her ophthalmology appointment. No associated abdominal pain, diarrhea, or urinary symptoms. No weight loss, rashes, or joint pain.     Of note, pt denies any recent travel or known sick contacts but works for the [employer] in which she visits different health care facilities and prisons.    In the ED,  T 100.4, HR , -149/86-90, RR 16-17, O2 sats % RA. WBC 12.06. CXR clear without any cavitary lesions.

## 2019-09-17 NOTE — H&P ADULT - PROBLEM SELECTOR PLAN 3
WBC 12.06 with fever of 100.4F. Unclear etiology, but can be related to pt's hemoptysis.  - S/p ceftriaxone and azithromycin in the ED. CXR clear. Will monitor off antibiotics for now pending CT chest.  - F/u UA, urine cx, and blood cxs  - ID consult in AM

## 2019-09-17 NOTE — H&P ADULT - ASSESSMENT
50 yo woman with history of HTN and migraine headaches (well controlled for past 2 years) presents with new onset of hemoptysis and diagnosis of iritis and anterior uveitis, concerning for autoimmune disorder.

## 2019-09-17 NOTE — H&P ADULT - PROBLEM SELECTOR PLAN 1
Pt with 4 total episodes of hemoptysis, each time a dime-sized amount. Possibly associated with fevers, chills, night sweats, and anterior uveitis. Differential for the hemoptysis includes TB, bronchitis, PNA, PE, GI etiology (e.g., Amy-Daniels tear), and autoimmune conditions (e.g., sarcoidosis, SLE, GPA, Goodpasture's). ESR 98 and CRP 29.7.   - Given presence of fevers, chills, and night sweats along with hemoptysis, will r/o pulmonary TB with AFB sputum smear/culture x3. Quant Gold also ordered.   - Airborne precautions   - CXR clear. CT chest ordered and pending.  - Lower suspicion for PE at this time, as pt without any CP, SOB, tachycardia, tachypnea, or hypoxia. Will get V/Q scan if pt develops S/S concerning for PE, as pt currently unable to get CTA chest given renal function.  - Start IV protonix 40 mg bid for possible Amy-Daniels tear, given recent frequent vomiting  - As pt recently diagnosed with iritis and anterior uveitis, hemoptysis can be from conditions, such as sarcoidosis, SLE, GPA, Goodpasture's, Behcet's, and other vasculitides that have both ocular and pulmonary manifestations. Will check JULI, anti-dsDNA, C3/C4, c-ANCA, and p-ANCA.  - ID and Rheumatology consults in AM Pt with 4 total episodes of hemoptysis, each time a dime-sized amount. Possibly associated with fevers, chills, night sweats, and anterior uveitis. Differential for the hemoptysis includes TB, bronchitis, PNA, PE, GI etiology (e.g., Amy-Daniels tear), and autoimmune conditions (e.g., sarcoidosis, SLE, GPA, Goodpasture's). ESR 98 and CRP 29.7.   - Given presence of fevers, chills, and night sweats along with hemoptysis, will r/o pulmonary TB with AFB sputum smear/culture x3. Quant Gold also ordered.   - HIV screen  - Airborne precautions   - CXR clear. CT chest ordered and pending.  - Lower suspicion for PE at this time, as pt without any CP, SOB, tachycardia, tachypnea, or hypoxia. Will get V/Q scan if pt develops S/S concerning for PE, as pt currently unable to get CTA chest given renal function.  - Start IV protonix 40 mg bid for possible Amy-Daniels tear, given recent frequent vomiting  - As pt recently diagnosed with iritis and anterior uveitis, hemoptysis can be from conditions, such as sarcoidosis, SLE, GPA, Goodpasture's, Behcet's, and other vasculitides that have both ocular and pulmonary manifestations. Will check JULI, anti-dsDNA, C3/C4, c-ANCA, and p-ANCA.  - ID and Rheumatology consults in AM  - GI consult in AM for possible EGD given concern for Amy-Daniels tears as cause of blood in sputum  - Monitor H/H and transfuse pRBCs to keep Hgb > 7

## 2019-09-17 NOTE — ED PROVIDER NOTE - ATTENDING CONTRIBUTION TO CARE
TIMOTHY Gallagher MD performed a history and physical exam of the patient and discussed their management with the resident and /or advanced care provider. I reviewed the resident and /or ACP's note and agree with the documented findings and plan of care. My medical decision making and observations are found above.    Awake, Alert, Conversant.  Coughing up blood streaked mucous during exam.  Breath Sounds clear B/L and without increased work of breathing.  Noted to be tachycardic in triage: on my exam normal heart rate and rhythm with normal S1/S2, NMRG.  Abdomen soft and nontender.  No lower extremity erythema or edema.

## 2019-09-18 DIAGNOSIS — N63.0 UNSPECIFIED LUMP IN UNSPECIFIED BREAST: ICD-10-CM

## 2019-09-18 DIAGNOSIS — R04.2 HEMOPTYSIS: ICD-10-CM

## 2019-09-18 DIAGNOSIS — D72.829 ELEVATED WHITE BLOOD CELL COUNT, UNSPECIFIED: ICD-10-CM

## 2019-09-18 DIAGNOSIS — H20.9 UNSPECIFIED IRIDOCYCLITIS: ICD-10-CM

## 2019-09-18 DIAGNOSIS — Z29.9 ENCOUNTER FOR PROPHYLACTIC MEASURES, UNSPECIFIED: ICD-10-CM

## 2019-09-18 DIAGNOSIS — N17.9 ACUTE KIDNEY FAILURE, UNSPECIFIED: ICD-10-CM

## 2019-09-18 DIAGNOSIS — D64.9 ANEMIA, UNSPECIFIED: ICD-10-CM

## 2019-09-18 DIAGNOSIS — Z98.890 OTHER SPECIFIED POSTPROCEDURAL STATES: Chronic | ICD-10-CM

## 2019-09-18 DIAGNOSIS — I10 ESSENTIAL (PRIMARY) HYPERTENSION: ICD-10-CM

## 2019-09-18 DIAGNOSIS — J18.9 PNEUMONIA, UNSPECIFIED ORGANISM: ICD-10-CM

## 2019-09-18 LAB
ALBUMIN SERPL ELPH-MCNC: 3.4 G/DL — SIGNIFICANT CHANGE UP (ref 3.3–5)
ALP SERPL-CCNC: 122 U/L — HIGH (ref 40–120)
ALT FLD-CCNC: 22 U/L — SIGNIFICANT CHANGE UP (ref 4–33)
ANION GAP SERPL CALC-SCNC: 16 MMO/L — HIGH (ref 7–14)
APPEARANCE UR: CLEAR — SIGNIFICANT CHANGE UP
AST SERPL-CCNC: 20 U/L — SIGNIFICANT CHANGE UP (ref 4–32)
BACTERIA # UR AUTO: NEGATIVE — SIGNIFICANT CHANGE UP
BASOPHILS # BLD AUTO: 0.09 K/UL — SIGNIFICANT CHANGE UP (ref 0–0.2)
BASOPHILS NFR BLD AUTO: 0.8 % — SIGNIFICANT CHANGE UP (ref 0–2)
BILIRUB SERPL-MCNC: 0.3 MG/DL — SIGNIFICANT CHANGE UP (ref 0.2–1.2)
BILIRUB UR-MCNC: NEGATIVE — SIGNIFICANT CHANGE UP
BLD GP AB SCN SERPL QL: NEGATIVE — SIGNIFICANT CHANGE UP
BLOOD UR QL VISUAL: NEGATIVE — SIGNIFICANT CHANGE UP
BUN SERPL-MCNC: 12 MG/DL — SIGNIFICANT CHANGE UP (ref 7–23)
C3 SERPL-MCNC: 142.8 MG/DL — SIGNIFICANT CHANGE UP (ref 90–180)
C4 SERPL-MCNC: 28.3 MG/DL — SIGNIFICANT CHANGE UP (ref 10–40)
CALCIUM SERPL-MCNC: 8.9 MG/DL — SIGNIFICANT CHANGE UP (ref 8.4–10.5)
CHLORIDE SERPL-SCNC: 101 MMOL/L — SIGNIFICANT CHANGE UP (ref 98–107)
CO2 SERPL-SCNC: 21 MMOL/L — LOW (ref 22–31)
COLOR SPEC: SIGNIFICANT CHANGE UP
CREAT SERPL-MCNC: 1.65 MG/DL — HIGH (ref 0.5–1.3)
EOSINOPHIL # BLD AUTO: 0.12 K/UL — SIGNIFICANT CHANGE UP (ref 0–0.5)
EOSINOPHIL NFR BLD AUTO: 1.1 % — SIGNIFICANT CHANGE UP (ref 0–6)
GLUCOSE SERPL-MCNC: 111 MG/DL — HIGH (ref 70–99)
GLUCOSE UR-MCNC: NEGATIVE — SIGNIFICANT CHANGE UP
GRAM STN SPT: SIGNIFICANT CHANGE UP
HCT VFR BLD CALC: 29.7 % — LOW (ref 34.5–45)
HGB BLD-MCNC: 9.3 G/DL — LOW (ref 11.5–15.5)
HIV 1+2 AB+HIV1 P24 AG SERPL QL IA: SIGNIFICANT CHANGE UP
HYALINE CASTS # UR AUTO: NEGATIVE — SIGNIFICANT CHANGE UP
IGA FLD-MCNC: 398 MG/DL — SIGNIFICANT CHANGE UP (ref 70–400)
IGG FLD-MCNC: 1418 MG/DL — SIGNIFICANT CHANGE UP (ref 700–1600)
IGM SERPL-MCNC: 160 MG/DL — SIGNIFICANT CHANGE UP (ref 40–230)
IMM GRANULOCYTES NFR BLD AUTO: 0.5 % — SIGNIFICANT CHANGE UP (ref 0–1.5)
KAPPA FREE LIGHT CHAINS, SERUM: 5.79 MG/DL — HIGH (ref 0.33–1.94)
KETONES UR-MCNC: NEGATIVE — SIGNIFICANT CHANGE UP
LAMBDA FREE LIGHT CHAINS, SERUM: 3.9 MG/DL — HIGH (ref 0.57–2.63)
LEUKOCYTE ESTERASE UR-ACNC: SIGNIFICANT CHANGE UP
LYMPHOCYTES # BLD AUTO: 1.2 K/UL — SIGNIFICANT CHANGE UP (ref 1–3.3)
LYMPHOCYTES # BLD AUTO: 11.3 % — LOW (ref 13–44)
MAGNESIUM SERPL-MCNC: 2.2 MG/DL — SIGNIFICANT CHANGE UP (ref 1.6–2.6)
MCHC RBC-ENTMCNC: 26.3 PG — LOW (ref 27–34)
MCHC RBC-ENTMCNC: 31.3 % — LOW (ref 32–36)
MCV RBC AUTO: 83.9 FL — SIGNIFICANT CHANGE UP (ref 80–100)
MONOCYTES # BLD AUTO: 1.01 K/UL — HIGH (ref 0–0.9)
MONOCYTES NFR BLD AUTO: 9.5 % — SIGNIFICANT CHANGE UP (ref 2–14)
NEUTROPHILS # BLD AUTO: 8.19 K/UL — HIGH (ref 1.8–7.4)
NEUTROPHILS NFR BLD AUTO: 76.8 % — SIGNIFICANT CHANGE UP (ref 43–77)
NITRITE UR-MCNC: NEGATIVE — SIGNIFICANT CHANGE UP
NRBC # FLD: 0.04 K/UL — SIGNIFICANT CHANGE UP (ref 0–0)
PH UR: 7 — SIGNIFICANT CHANGE UP (ref 5–8)
PHOSPHATE SERPL-MCNC: 3.7 MG/DL — SIGNIFICANT CHANGE UP (ref 2.5–4.5)
PLATELET # BLD AUTO: 310 K/UL — SIGNIFICANT CHANGE UP (ref 150–400)
PMV BLD: 10 FL — SIGNIFICANT CHANGE UP (ref 7–13)
POTASSIUM SERPL-MCNC: 4.2 MMOL/L — SIGNIFICANT CHANGE UP (ref 3.5–5.3)
POTASSIUM SERPL-SCNC: 4.2 MMOL/L — SIGNIFICANT CHANGE UP (ref 3.5–5.3)
PROT SERPL-MCNC: 7.3 G/DL — SIGNIFICANT CHANGE UP (ref 6–8.3)
PROT SERPL-MCNC: 7.4 G/DL — SIGNIFICANT CHANGE UP (ref 6–8.3)
PROT UR-MCNC: 14.7 MG/DL — SIGNIFICANT CHANGE UP
PROT UR-MCNC: NEGATIVE — SIGNIFICANT CHANGE UP
RBC # BLD: 3.54 M/UL — LOW (ref 3.8–5.2)
RBC # FLD: 14.5 % — SIGNIFICANT CHANGE UP (ref 10.3–14.5)
RBC CASTS # UR COMP ASSIST: SIGNIFICANT CHANGE UP (ref 0–?)
RH IG SCN BLD-IMP: POSITIVE — SIGNIFICANT CHANGE UP
RHEUMATOID FACT SERPL-ACNC: SIGNIFICANT CHANGE UP IU/ML (ref 0–13)
SODIUM SERPL-SCNC: 138 MMOL/L — SIGNIFICANT CHANGE UP (ref 135–145)
SP GR SPEC: 1.01 — SIGNIFICANT CHANGE UP (ref 1–1.04)
SPECIMEN SOURCE: SIGNIFICANT CHANGE UP
SQUAMOUS # UR AUTO: SIGNIFICANT CHANGE UP
TSH SERPL-MCNC: 1.17 UIU/ML — SIGNIFICANT CHANGE UP (ref 0.27–4.2)
UROBILINOGEN FLD QL: NORMAL — SIGNIFICANT CHANGE UP
WBC # BLD: 10.66 K/UL — HIGH (ref 3.8–10.5)
WBC # FLD AUTO: 10.66 K/UL — HIGH (ref 3.8–10.5)
WBC UR QL: HIGH (ref 0–?)

## 2019-09-18 PROCEDURE — 99233 SBSQ HOSP IP/OBS HIGH 50: CPT | Mod: GC

## 2019-09-18 PROCEDURE — 84165 PROTEIN E-PHORESIS SERUM: CPT | Mod: 26

## 2019-09-18 PROCEDURE — 71250 CT THORAX DX C-: CPT | Mod: 26

## 2019-09-18 PROCEDURE — 86334 IMMUNOFIX E-PHORESIS SERUM: CPT | Mod: 26

## 2019-09-18 PROCEDURE — 99222 1ST HOSP IP/OBS MODERATE 55: CPT | Mod: GC

## 2019-09-18 RX ORDER — SODIUM CHLORIDE 9 MG/ML
1000 INJECTION, SOLUTION INTRAVENOUS
Refills: 0 | Status: DISCONTINUED | OUTPATIENT
Start: 2019-09-18 | End: 2019-09-18

## 2019-09-18 RX ORDER — AMLODIPINE BESYLATE 2.5 MG/1
1 TABLET ORAL
Qty: 0 | Refills: 0 | DISCHARGE

## 2019-09-18 RX ORDER — AZITHROMYCIN 500 MG/1
500 TABLET, FILM COATED ORAL EVERY 24 HOURS
Refills: 0 | Status: DISCONTINUED | OUTPATIENT
Start: 2019-09-18 | End: 2019-09-21

## 2019-09-18 RX ORDER — ACETAMINOPHEN 500 MG
650 TABLET ORAL EVERY 6 HOURS
Refills: 0 | Status: DISCONTINUED | OUTPATIENT
Start: 2019-09-18 | End: 2019-09-25

## 2019-09-18 RX ORDER — OXYCODONE HYDROCHLORIDE 5 MG/1
5 TABLET ORAL EVERY 6 HOURS
Refills: 0 | Status: DISCONTINUED | OUTPATIENT
Start: 2019-09-18 | End: 2019-09-24

## 2019-09-18 RX ORDER — PANTOPRAZOLE SODIUM 20 MG/1
40 TABLET, DELAYED RELEASE ORAL
Refills: 0 | Status: DISCONTINUED | OUTPATIENT
Start: 2019-09-18 | End: 2019-09-19

## 2019-09-18 RX ORDER — PREDNISOLONE SODIUM PHOSPHATE 1 %
1 DROPS OPHTHALMIC (EYE)
Refills: 0 | Status: DISCONTINUED | OUTPATIENT
Start: 2019-09-18 | End: 2019-09-24

## 2019-09-18 RX ORDER — ONDANSETRON 8 MG/1
4 TABLET, FILM COATED ORAL EVERY 6 HOURS
Refills: 0 | Status: DISCONTINUED | OUTPATIENT
Start: 2019-09-18 | End: 2019-09-19

## 2019-09-18 RX ORDER — FOLIC ACID 0.8 MG
1 TABLET ORAL DAILY
Refills: 0 | Status: DISCONTINUED | OUTPATIENT
Start: 2019-09-18 | End: 2019-09-25

## 2019-09-18 RX ORDER — CEFTRIAXONE 500 MG/1
1000 INJECTION, POWDER, FOR SOLUTION INTRAMUSCULAR; INTRAVENOUS EVERY 24 HOURS
Refills: 0 | Status: COMPLETED | OUTPATIENT
Start: 2019-09-18 | End: 2019-09-21

## 2019-09-18 RX ORDER — LABETALOL HCL 100 MG
1 TABLET ORAL
Qty: 0 | Refills: 0 | DISCHARGE

## 2019-09-18 RX ORDER — LABETALOL HCL 100 MG
100 TABLET ORAL
Refills: 0 | Status: DISCONTINUED | OUTPATIENT
Start: 2019-09-18 | End: 2019-09-25

## 2019-09-18 RX ORDER — SODIUM CHLORIDE 9 MG/ML
1000 INJECTION, SOLUTION INTRAVENOUS
Refills: 0 | Status: DISCONTINUED | OUTPATIENT
Start: 2019-09-18 | End: 2019-09-19

## 2019-09-18 RX ORDER — FOLIC ACID 0.8 MG
1 TABLET ORAL
Qty: 0 | Refills: 0 | DISCHARGE

## 2019-09-18 RX ORDER — ACETAMINOPHEN 500 MG
650 TABLET ORAL ONCE
Refills: 0 | Status: COMPLETED | OUTPATIENT
Start: 2019-09-18 | End: 2019-09-18

## 2019-09-18 RX ORDER — AZITHROMYCIN 500 MG/1
500 TABLET, FILM COATED ORAL ONCE
Refills: 0 | Status: DISCONTINUED | OUTPATIENT
Start: 2019-09-18 | End: 2019-09-18

## 2019-09-18 RX ADMIN — Medication 650 MILLIGRAM(S): at 06:05

## 2019-09-18 RX ADMIN — Medication 650 MILLIGRAM(S): at 09:06

## 2019-09-18 RX ADMIN — ONDANSETRON 4 MILLIGRAM(S): 8 TABLET, FILM COATED ORAL at 20:56

## 2019-09-18 RX ADMIN — Medication 650 MILLIGRAM(S): at 20:55

## 2019-09-18 RX ADMIN — SODIUM CHLORIDE 75 MILLILITER(S): 9 INJECTION, SOLUTION INTRAVENOUS at 02:34

## 2019-09-18 RX ADMIN — Medication 1 DROP(S): at 06:25

## 2019-09-18 RX ADMIN — PANTOPRAZOLE SODIUM 40 MILLIGRAM(S): 20 TABLET, DELAYED RELEASE ORAL at 17:23

## 2019-09-18 RX ADMIN — OXYCODONE HYDROCHLORIDE 5 MILLIGRAM(S): 5 TABLET ORAL at 03:30

## 2019-09-18 RX ADMIN — SODIUM CHLORIDE 75 MILLILITER(S): 9 INJECTION, SOLUTION INTRAVENOUS at 16:27

## 2019-09-18 RX ADMIN — Medication 1 DROP(S): at 11:09

## 2019-09-18 RX ADMIN — PANTOPRAZOLE SODIUM 40 MILLIGRAM(S): 20 TABLET, DELAYED RELEASE ORAL at 02:33

## 2019-09-18 RX ADMIN — PANTOPRAZOLE SODIUM 40 MILLIGRAM(S): 20 TABLET, DELAYED RELEASE ORAL at 05:07

## 2019-09-18 RX ADMIN — Medication 1 DROP(S): at 17:24

## 2019-09-18 RX ADMIN — Medication 650 MILLIGRAM(S): at 14:20

## 2019-09-18 RX ADMIN — Medication 100 MILLIGRAM(S): at 17:23

## 2019-09-18 RX ADMIN — CEFTRIAXONE 100 MILLIGRAM(S): 500 INJECTION, POWDER, FOR SOLUTION INTRAMUSCULAR; INTRAVENOUS at 13:02

## 2019-09-18 RX ADMIN — ONDANSETRON 4 MILLIGRAM(S): 8 TABLET, FILM COATED ORAL at 09:40

## 2019-09-18 RX ADMIN — Medication 100 MILLIGRAM(S): at 05:55

## 2019-09-18 RX ADMIN — Medication 650 MILLIGRAM(S): at 05:07

## 2019-09-18 RX ADMIN — OXYCODONE HYDROCHLORIDE 5 MILLIGRAM(S): 5 TABLET ORAL at 02:32

## 2019-09-18 RX ADMIN — AZITHROMYCIN 250 MILLIGRAM(S): 500 TABLET, FILM COATED ORAL at 17:23

## 2019-09-18 RX ADMIN — Medication 1 MILLIGRAM(S): at 11:18

## 2019-09-18 RX ADMIN — Medication 650 MILLIGRAM(S): at 09:38

## 2019-09-18 RX ADMIN — ONDANSETRON 4 MILLIGRAM(S): 8 TABLET, FILM COATED ORAL at 04:13

## 2019-09-18 RX ADMIN — Medication 650 MILLIGRAM(S): at 15:00

## 2019-09-18 RX ADMIN — Medication 650 MILLIGRAM(S): at 21:46

## 2019-09-18 NOTE — PROGRESS NOTE ADULT - PROBLEM SELECTOR PLAN 1
Pt with 4 total episodes of hemoptysis, each time a dime-sized amount. Possibly associated with fevers, chills, night sweats, and anterior uveitis. Differential for the hemoptysis includes TB, bronchitis, PNA, PE, GI etiology (e.g., Amy-Daniels tear), and autoimmune conditions (e.g., sarcoidosis, SLE, GPA, Goodpasture's). ESR 98 and CRP 29.7.   - Given presence of fevers, chills, and night sweats along with hemoptysis, will r/o pulmonary TB with AFB sputum smear/culture x3. Quant Gold also ordered.   - HIV screen  - Airborne precautions   - CXR clear. CT chest ordered and pending.  - Lower suspicion for PE at this time, as pt without any CP, SOB, tachycardia, tachypnea, or hypoxia. Will get V/Q scan if pt develops S/S concerning for PE, as pt currently unable to get CTA chest given renal function.  - Start IV protonix 40 mg bid for possible Amy-Daniels tear, given recent frequent vomiting  - As pt recently diagnosed with iritis and anterior uveitis, hemoptysis can be from conditions, such as sarcoidosis, SLE, GPA, Goodpasture's, Behcet's, and other vasculitides that have both ocular and pulmonary manifestations. Will check JULI, anti-dsDNA, C3/C4, c-ANCA, and p-ANCA.  - ID and Rheumatology consults in AM  - GI consult in AM for possible EGD given concern for Amy-Daniels tears as cause of blood in sputum  - Monitor H/H and transfuse pRBCs to keep Hgb > 7 Pt with 4 total episodes of hemoptysis, each time a dime-sized amount. Possibly associated with fevers, chills, night sweats, and anterior uveitis. Differential for the hemoptysis includes TB, bronchitis, PNA, PE, GI etiology (e.g., Amy-Daniels tear), and autoimmune conditions (e.g., sarcoidosis, SLE, GPA, Goodpasture's). ESR 98 and CRP 29.7.   - Given presence of fevers, chills, and night sweats along with hemoptysis, will r/o pulmonary TB with AFB sputum smear/culture x3.  Lower suspicion for PE at this time, as pt without any CP, SOB, tachycardia, tachypnea, or hypoxia. HIV non-reactive   - f/u Quant Gold    - Airborne precautions   - CXR clear  - CT of chest w/o contrast significant for right upper lobe mixed solid and groundglass patchy groundglass opacity likely pneumonia. Recommended 1 to 3 month follow-up chest CT to ensure resolution and exclude primary lung neoplasm.  - Continue IV protonix 40 mg BID, for concern of Amy-Daniels tear, given recent frequent vomiting  - As pt recently diagnosed with iritis and anterior uveitis, hemoptysis can be from conditions, such as sarcoidosis, SLE, GPA, Goodpasture's, Behcet's, and other vasculitides that have both ocular and pulmonary manifestations. Will check JULI, anti-dsDNA, C3/C4, c-ANCA, and p-ANCA, PR3   - Rheumatology consult, appreciate recommendations   - Will consult pulmonology for hemoptysis work up and concern for pulmonary nodule on CT imaging Pt with 4 total episodes of hemoptysis, each time a dime-sized amount. Possibly associated with fevers, chills, night sweats, and anterior uveitis. Differential for the hemoptysis includes TB, bronchitis, PNA, PE, GI etiology (e.g., Amy-Daniels tear), and autoimmune conditions (e.g., sarcoidosis, SLE, GPA, Goodpasture's). ESR 98 and CRP 29.7.   - Given presence of fevers, chills, and night sweats along with hemoptysis, will r/o pulmonary TB with AFB sputum smear/culture x3.  Lower suspicion for PE at this time, as pt without any CP, SOB, tachycardia, tachypnea, or hypoxia. HIV non-reactive   - f/u Quant Gold    - Airborne precautions   - CXR clear  - CT of chest w/o contrast significant for right upper lobe mixed solid and groundglass patchy groundglass opacity likely pneumonia. Recommended 1 to 3 month follow-up chest CT to ensure resolution and exclude primary lung neoplasm.  - Continue IV protonix 40 mg BID, for concern of Amy-Daniels tear, given recent frequent vomiting  - As pt recently diagnosed with iritis and anterior uveitis, hemoptysis can be from conditions, such as sarcoidosis, SLE, GPA, Goodpasture's, Behcet's, and other vasculitides that have both ocular and pulmonary manifestations. Will check JULI, anti-dsDNA, C3/C4, c-ANCA, and p-ANCA, PR3   - Rheumatology consult, appreciate recommendations   - Will consider pulmonology consult if hemoptysis worsens

## 2019-09-18 NOTE — PROGRESS NOTE ADULT - ASSESSMENT
50 yo woman with history of HTN and migraine headaches (well controlled for past 2 years) presents with new onset of hemoptysis and diagnosis of iritis and anterior uveitis, concerning for autoimmune disorder vs infection. 48 yo  female with history of HTN and migraine headaches (well controlled for past 2 years) presents with new onset of hemoptysis associated with N/V, fever and leukocytosis and recent diagnosis of iritis and anterior uveitis, concerning for pneumonia complicated by potential underlying autoimmune disorder vs malignancy. 48 yo  female, domiciled with family, employed by [employer], with history of HTN and migraine headaches (well controlled for past 2 years) presents with new onset of hemoptysis associated with N/V, fever and leukocytosis and recent diagnosis of iritis and anterior uveitis, concerning for pneumonia complicated by potential underlying autoimmune disorder vs malignancy.

## 2019-09-18 NOTE — PROGRESS NOTE ADULT - PROBLEM SELECTOR PLAN 8
- DVT ppx: Will continue to hold pharmacologic ppx given recent hemoptysis  - Diet: NPO after MN for possible procedure for hemoptysis workup - DVT ppx: Will continue to hold pharmacologic ppx given recent hemoptysis  - Diet: clear liquid diet, will advance as tolerated

## 2019-09-18 NOTE — PROGRESS NOTE ADULT - PROBLEM SELECTOR PLAN 3
WBC 12.06 with fever of 100.4F. Unclear etiology, but can be related to pt's hemoptysis.  - S/p ceftriaxone and azithromycin in the ED. CXR clear. Will monitor off antibiotics for now pending CT chest.  - F/u UA, urine cx, and blood cxs  - ID consult in AM Pt with diagnosis of iritis and anterior uveitis by ophthalmology on Tuesday and prescribed Prednisolone eye drops.  - C/w Prednisolone 1% eye drops qid (prescription confirmed via ophthalmology office note in HIE)  - As anterior uveitis associated with autoimmune conditions, such as sarcoidosis, SLE, GPA, Goodpasture's, Behcet's, other vasculitides, IBD, and rheumatoid arthritis. Will check JULI, anti-dsDNA, C3/C4, c-ANCA, p-ANCA, RF, and anti-CCP.  - Rheumatology consult, appreciate recs   - Pain control with Tylenol PRN for moderate pain and Oxycodone IR 5 mg q6hrs PRN for severe pain

## 2019-09-18 NOTE — PROGRESS NOTE ADULT - PROBLEM SELECTOR PLAN 4
Cr on admission 1.8 vs. 1.11 on 9/12/19, downtrending, likely pre-renal due to dehydration. BUN 14. Unclear etiology, but can be component of prerenal from recent vomiting episodes and possible autoimmune disorder/vasculitis.   - Workup as above for Hemoptysis and Uveitis  - Check urine lytes  - Given KAN with anemia and protein gap, will check UPEP, SPEP, serum immunofixation, serum FLC, and quant immunoglobulins for possible multiple myeloma  - Monitor Cr and UOP  - Avoid NSAIDs, ACEi/ARBs, contrast, nephrotoxic agents. Will hold pt's home losartan.  - Renally dose meds Cr on admission 1.8 vs. 1.11 on 9/12/19, downtrending currently 1.6, likely pre-renal due to dehydration. BUN 14. Likely pre-renal due to dehydration, will r/o possible autoimmune disorder/vasculitis.   - Will continue to monitor   - Will continue IV fluids   - Given KAN with anemia and protein gap, will check UPEP, SPEP, serum immunofixation, serum FLC, and quant immunoglobulins for possible multiple myeloma  - Monitor Cr and UOP  - Avoid NSAIDs, ACEi/ARBs, contrast, nephrotoxic agents. Will hold pt's home losartan.  - Renally dose meds Cr on admission 1.8 vs. 1.11 on 9/12/19, downtrending currently 1.6. BUN 14. Likely pre-renal due to dehydration, will r/o possible autoimmune disorder/vasculitis.   - Will continue to monitor CMP   - Will continue IV fluids   - Given KAN with anemia and protein gap, will check UPEP, SPEP, serum immunofixation, serum FLC, and quant immunoglobulins for possible multiple myeloma  - Monitor Cr and UOP  - Avoid NSAIDs, ACEi/ARBs, contrast, nephrotoxic agents. Will hold pt's home losartan.  - Renally dose meds

## 2019-09-18 NOTE — CONSULT NOTE ADULT - ASSESSMENT
50 yo woman with history of HTN and migraine headaches, recently diagnosed with iritis/anterior uveitis, currently sent to ED for hemoptysis, found with KAN, anemia, protein gap, presentation concerning for autoimmune disorder (sarcoidosis, SLE, GPA, Goodpasture's, Behcet's, IBD, RA, other vasculidites) versus multiple myeloma..    #Hemoptysis  - agree with autoimmune work-up: JULI, dsDNA, p-ANCA, c-ANCA, anti-MPO, anti-PR3, RF, CCP   - agree with: SPEP, UPEP, serum immunofixation to r/o multiple myeloma  - agree with: Quant TB, HIV, sputum acid fast to r/o TB  - agree with: CT chest given hemoptysis  - check urine protein  - check ACE, calcium, vitamin D (25 and 125 OH)  - check anti-GBM ABs    #anemia  - check iron and TIBC, ferritin  - check LDH, Haptoglobin to r/o hemolytic anemia given acute autoiummune presentation    PRELIM RESIDENT NOTE PENDING ATTENDING ATTESTATION 50 yo woman with history of HTN and migraine headaches, recently diagnosed with iritis/anterior uveitis, currently sent to ED for hemoptysis, found with KAN, anemia, protein gap, presentation concerning for autoimmune disorder (sarcoidosis, SLE, GPA, Goodpasture's, Behcet's, IBD, RA, other vasculidites) versus multiple myeloma..    #Hemoptysis  - agree with autoimmune work-up: JULI, dsDNA, p-ANCA, c-ANCA, anti-MPO, anti-PR3, RF, CCP, scleroderma, sjogren  - agree with: SPEP, UPEP, serum immunofixation to r/o multiple myeloma  - agree with: Quant TB, HIV, sputum acid fast to r/o TB  - agree with: CT chest given hemoptysis  - check urine protein  - check ACE, calcium, vitamin D (25 and 125 OH)  - check anti-GBM ABs    #anemia  - check iron and TIBC, ferritin  - check LDH, Haptoglobin to r/o hemolytic anemia given acute autoiummune presentation    PRELIM RESIDENT NOTE PENDING ATTENDING ATTESTATION 50 yo woman with history of HTN and migraine headaches, recently diagnosed with iritis/anterior uveitis, with FH of sarcoid in daughter, currently sent to ED for hemoptysis, found with KAN, anemia, protein gap, CT scan showing ground glass opacities with hilar/mediastinal LAD presentation concerning for autoimmune disorder likely sarcoid but ddx is broad including SLE, GPA, Goodpasture's, Behcet's, IBD, RA, other vasculidites) versus multiple myeloma..    #Hemoptysis  - agree with autoimmune work-up: JULI, dsDNA, p-ANCA, c-ANCA, anti-MPO, anti-PR3, RF, CCP, scleroderma, sjogren  - agree with: SPEP, UPEP, serum immunofixation to r/o multiple myeloma  - agree with: Quant TB, HIV, sputum acid fast to r/o TB  - CT chest given hemoptysis showing ground glass opacities, possible superimposed PNA, hilar and mediastinal adenopathy  - check urine protein  - check ACE, calcium, vitamin D (25 and 125 OHD)  - check anti-GBM ABs    #anemia  - check iron and TIBC, ferritin    PRELIM RESIDENT NOTE PENDING ATTENDING ATTESTATION 50 yo woman with history of HTN and migraine headaches, recently diagnosed with iritis/anterior uveitis, with FH of sarcoid in daughter, currently sent to ED for hemoptysis, found with KAN, anemia, protein gap, CT scan showing ground glass opacities with hilar/mediastinal LAD presentation concerning for autoimmune disorder likely sarcoid but ddx is broad including SLE, GPA, Goodpasture's, Behcet's, IBD, RA, other vasculidites) versus infectious process (IE r/o TB) VS. malignant process (IE lymphoma).     #Hemoptysis  - agree with autoimmune work-up: JULI, dsDNA, p-ANCA, c-ANCA, anti-MPO, anti-PR3, RF, CCP, scleroderma, sjogren  - agree with: SPEP, UPEP, serum immunofixation to r/o multiple myeloma  - agree with: Quant TB, HIV, sputum acid fast to r/o TB  - CT chest given hemoptysis showing ground glass opacities, possible superimposed PNA, hilar and mediastinal adenopathy, left breast nodule  - check urine protein  - check ACE, calcium, vitamin D (25 and 125 OHD)  - check anti-GBM ABs  - would consider pulmonology consult for possible bronchoscopy    #Uveitis  - continue prednisolone drops  - would pursue infectious workup for uveitis    #anemia  - check iron and TIBC, ferritin    PRELIM RESIDENT NOTE PENDING ATTENDING ATTESTATION 50 yo woman with history of HTN and migraine headaches, recently diagnosed with iritis/anterior uveitis, with FH of sarcoid in daughter, currently sent to ED for hemoptysis, found with KAN, anemia, protein gap, CT scan showing ground glass opacities with hilar/mediastinal LAD presentation concerning for autoimmune disorder likely sarcoid but ddx is broad including SLE, GPA, Goodpasture's, Behcet's, IBD, RA, other vasculidites) versus infectious process (IE r/o TB) VS. malignant process (IE lymphoma).     #Hemoptysis  - agree with autoimmune work-up: JULI, dsDNA, p-ANCA, c-ANCA, anti-MPO, anti-PR3, RF, CCP, scleroderma, sjogren  - agree with: SPEP, UPEP, serum immunofixation to r/o multiple myeloma  - agree with: Quant TB, HIV, sputum acid fast to r/o TB  - CT chest given hemoptysis showing ground glass opacities, possible superimposed PNA, hilar and mediastinal adenopathy, left breast nodule  - check urine protein  - check ACE, calcium, vitamin D (25 and 125 OHD)  - check anti-GBM ABs  - would consider pulmonology consult for possible bronchoscopy    #Uveitis  - continue prednisolone drops  - would pursue infectious workup for uveitis    #anemia  - check iron and TIBC, ferritin    Discussed with attending Dr. Bill Acevedo

## 2019-09-18 NOTE — PROGRESS NOTE ADULT - PROBLEM SELECTOR PLAN 5
Hgb 10.2 on admission in setting of hemoptysis vs. 10.1 on 9/12/19. Per pt, has h/o anemia and has been on folic acid supplements as a result.   - Monitor H/H and transfuse pRBCs to keep Hgb > 7  - C/w folic acid 1 mg daily  - Keep active type and screen  - Multiple myeloma workup as noted in plan for KAN  - Check iron studies, folate, vitamin B12 Hgb 10.2 on admission in setting of hemoptysis vs. 10.1 on 9/12/19. Per pt, has h/o anemia and has been on folic acid supplements as a result.   - Monitor H/H and transfuse pRBCs to keep Hgb > 7  - C/w folic acid 1 mg daily  - Type and screen ordered   - Multiple myeloma workup as noted in plan for KAN

## 2019-09-18 NOTE — CONSULT NOTE ADULT - SUBJECTIVE AND OBJECTIVE BOX
Patient is a 49y old  Female who presents with a chief complaint of Hemoptysis (18 Sep 2019 09:18)      HPI:  48 yo woman with history of HTN and migraine headaches (well controlled for past 2 years) presents with new onset of hemoptysis while pt was at her ophthalmologist's office on Tuesday. Pt had an appointment with ophthalmology due to unresolving b/l eye pain, redness, tearing, and photophobia that pt has had since 19. Pt initially came to [facility] ED on 19 for those eye symptoms along with rhinorrhea and was diagnosed with acute viral conjunctivitis, for which pt was prescribed Trimethoprim and Polymyxin B eye drops and Flonase. However, pt's symptoms failed to improve, with pt returning to the ED on 19 for migraine-like headaches, nausea, and NBNB vomiting, in addition to the persistent b/l eye pain, though pt was subsequently discharged home from the ED after pt felt better with pain meds. Pt was eventually referred to ophthalmology by her PCP and had her first visit on Tuesday. At the visit, pt was informed that her eye symptoms were not due to conjunctivitis but from iritis and anterior uveitis, concerning for an autoimmune disorder, with pt prescribed Prednisolone eye drops. While pt was standing by the reception desk following her appointment, pt started coughing, bringing up sputum mixed with bright red blood. Pt had 3 more episodes of hemoptysis in the ED, each time bringing up a dime-sized amount of bright red blood. Pt denies any associated CP, SOB, palpitations, or LE edema, erythema, or pain but reports that shortly after onset of her eye symptoms on 19, pt had intense episodes of fevers, chills, and night sweats, soaking her sheets. Pt also notes that she had severe nausea with ~8 episodes of NBNB vomiting from Monday afternoon to Tuesday morning, resolving just prior to her ophthalmology appointment. No associated abdominal pain, diarrhea, or urinary symptoms. No weight loss, rashes, or joint pain.     Of note, pt denies any recent travel or known sick contacts but works for the [employer] in which she visits different health care facilities and prisons.    In the ED,  T 100.4, HR , -149/86-90, RR 16-17, O2 sats % RA. WBC 12.06. CXR clear without any cavitary lesions. (17 Sep 2019 22:27). Patient received ceftriaxone and azithromycin, 1L NS, tylenol, and zofran in the ED.     Hospital course: Complement levels WNL, elevated ESR/CRP. Further Autoimmune workup, r/o TB, in progress.     REVIEW OF SYSTEMS:  Constitutional: +Fevers/chills. No weakness or weight loss.  	Eyes: +B/l eye pain, redness, tearing, and photophobia. No visual changes or double vision.  	Ears, Nose, Mouth, Throat: +Runny nose (now resolved). No sinus pain, ear pain, tinnitus, sore throat, dysphagia, or odynophagia.  	Cardiovascular: No chest pain, palpitations, or LE edema  	Respiratory: +Hemoptysis. No wheezing or shortness of breath.  	Gastrointestinal: +Nausea/vomiting (now resolved). No abdominal pain, diarrhea/constipation, hematemesis, BRBPR, or melena.  	Genitourinary: No dysuria, frequency, urgency, or hematuria  	Musculoskeletal: No joint pain, joint swelling, or decreased ROM  	Skin: No pruritus or rashes  	Neurologic: +Chronic migraine headache. No seizures, paresthesias, numbness, or limb weakness.  	Psychiatric: No depression, anxiety, or agitation  	Endocrine: No heat/cold intolerance, mood swings, sweats, polydipsia, or polyuria  	Hematologic/lymphatic: No purpura, petechia, or prolonged or excessive bleeding after dental extraction / injury  	Allergic/Immunologic: No anaphylaxis or allergic response to materials, foods, animals      PAST MEDICAL & SURGICAL HISTORY:  Migraine headache  HTN (hypertension)  History of rotator cuff surgery  Delivery with history of       FAMILY HISTORY:  Family history of hypertension  Family history of type 2 diabetes mellitus      SOCIAL HISTORY:  Smoking Status: [ ] Current, [ ] Former, [ ] Never  Pack Years:    MEDICATIONS:  MEDICATIONS  (STANDING):  folic acid 1 milliGRAM(s) Oral daily  influenza   Vaccine 0.5 milliLiter(s) IntraMuscular once  labetalol 100 milliGRAM(s) Oral two times a day  lactated ringers. 1000 milliLiter(s) (75 mL/Hr) IV Continuous <Continuous>  pantoprazole  Injectable 40 milliGRAM(s) IV Push two times a day  prednisoLONE acetate 1% Suspension 1 Drop(s) Both EYES four times a day    MEDICATIONS  (PRN):  acetaminophen   Tablet .. 650 milliGRAM(s) Oral every 6 hours PRN Moderate Pain (4 - 6)  ondansetron Injectable 4 milliGRAM(s) IV Push every 6 hours PRN Nausea and/or Vomiting  oxyCODONE    IR 5 milliGRAM(s) Oral every 6 hours PRN Severe Pain (7 - 10)      Allergies    No Known Allergies    Intolerances        Vital Signs Last 24 Hrs  T(C): 37.4 (18 Sep 2019 05:03), Max: 38 (17 Sep 2019 17:49)  T(F): 99.4 (18 Sep 2019 05:03), Max: 100.4 (17 Sep 2019 17:49)  HR: 87 (18 Sep 2019 05:53) (82 - 107)  BP: 134/91 (18 Sep 2019 05:53) (124/83 - 149/87)  BP(mean): --  RR: 20 (18 Sep 2019 05:03) (16 - 20)  SpO2: 100% (18 Sep 2019 05:03) (97% - 100%)     @ 07:01  -  -18 @ 07:00  --------------------------------------------------------  IN: 0 mL / OUT: 200 mL / NET: -200 mL          PHYSICAL EXAM:    General: Well developed; well nourished; in no acute distress  HEENT: MMM, conjunctiva and sclera clear  Gastrointestinal: Soft, non-tender non-distended; Normal bowel sounds; No rebound or guarding  Extremities: Normal range of motion, No clubbing, cyanosis or edema  Neurological: Alert and oriented x3  Skin: Warm and dry. No obvious rash      LABS:                        9.3    10.66 )-----------( 310      ( 18 Sep 2019 07:22 )             29.7     18    138  |  101  |  12  ----------------------------<  111<H>  4.2   |  21<L>  |  1.65<H>    Ca    8.9      18 Sep 2019 07:22  Phos  3.7       Mg     2.2         TPro  7.4  /  Alb  3.4  /  TBili  0.3  /  DBili  x   /  AST  20  /  ALT  22  /  AlkPhos  122<H>          RADIOLOGY & ADDITIONAL STUDIES: Patient is a 49y old  Female who presents with a chief complaint of Hemoptysis (18 Sep 2019 09:18)      HPI:  50 yo woman with history of HTN and migraine headaches (well controlled for past 2 years) presents with new onset of hemoptysis while pt was at her ophthalmologist's office on Tuesday. Pt had an appointment with ophthalmology due to unresolving b/l eye pain, redness, tearing, and photophobia that pt has had since 19. Pt initially came to [facility] ED on 19 for those eye symptoms along with rhinorrhea and was diagnosed with acute viral conjunctivitis, for which pt was prescribed Trimethoprim and Polymyxin B eye drops and Flonase. However, pt's symptoms failed to improve, with pt returning to the ED on 19 for migraine-like headaches, nausea, and NBNB vomiting, in addition to the persistent b/l eye pain, though pt was subsequently discharged home from the ED after pt felt better with pain meds. Pt was eventually referred to ophthalmology by her PCP and had her first visit on Tuesday. At the visit, pt was informed that her eye symptoms were not due to conjunctivitis but from iritis and anterior uveitis, concerning for an autoimmune disorder, with pt prescribed Prednisolone eye drops. While pt was standing by the reception desk following her appointment, pt started coughing, bringing up sputum mixed with bright red blood. Pt had 3 more episodes of hemoptysis in the ED, each time bringing up a dime-sized amount of bright red blood. Pt denies any associated CP, SOB, palpitations, or LE edema, erythema, or pain but reports that shortly after onset of her eye symptoms on 19, pt had intense episodes of fevers, chills, and night sweats, soaking her sheets. Pt also notes that she had severe nausea with ~8 episodes of NBNB vomiting from Monday afternoon to Tuesday morning, resolving just prior to her ophthalmology appointment. She reports the vomiting occurred after eating a beef noelle. She endorses dry mouth in the past but believed this to be side effect of HTN medication. She endorses night sweats for the past 2 weeks. Also endorses cold symptoms, dyspnea, cough productive of yellow mucus in August that self-resolved. she denies abdominal pain, diarrhea, urinary symptoms, hematuria, oral ulcers, rash weight loss, joint pain. She reports she has a daughter with sarcoidosis diagnosed in her 20s and her mother has arthritis.      Of note, pt denies any recent travel or known sick contacts but works for the [employer] in which she visits different health care facilities and prisons.    In the ED,  T 100.4, HR , -149/86-90, RR 16-17, O2 sats % RA. WBC 12.06. CXR clear without any cavitary lesions. (17 Sep 2019 22:27). Patient received ceftriaxone and azithromycin, 1L NS, tylenol, and zofran in the ED.     Hospital course: Complement levels WNL, elevated ESR/CRP. Further Autoimmune workup, r/o TB, in progress.     REVIEW OF SYSTEMS:  Constitutional: +Fevers/chills. No weakness or weight loss.  	Eyes: +B/l eye pain, redness, tearing, and photophobia. No visual changes or double vision.  	Ears, Nose, Mouth, Throat: +Runny nose (now resolved). No sinus pain, ear pain, tinnitus, sore throat, dysphagia, or odynophagia.  	Cardiovascular: No chest pain, palpitations, or LE edema  	Respiratory: +Hemoptysis. No wheezing or shortness of breath.  	Gastrointestinal: +Nausea/vomiting (now resolved). No abdominal pain, diarrhea/constipation, hematemesis, BRBPR, or melena.  	Genitourinary: No dysuria, frequency, urgency, or hematuria  	Musculoskeletal: No joint pain, joint swelling, or decreased ROM  	Skin: No pruritus or rashes  	Neurologic: +Chronic migraine headache. No seizures, paresthesias, numbness, or limb weakness.  	Psychiatric: No depression, anxiety, or agitation  	Endocrine: No heat/cold intolerance, mood swings, sweats, polydipsia, or polyuria  	Hematologic/lymphatic: No purpura, petechia, or prolonged or excessive bleeding after dental extraction / injury  	Allergic/Immunologic: No anaphylaxis or allergic response to materials, foods, animals      PAST MEDICAL & SURGICAL HISTORY:  Migraine headache  HTN (hypertension)  History of rotator cuff surgery  Delivery with history of       FAMILY HISTORY:  Family history of hypertension  Family history of type 2 diabetes mellitus  Daughter with sarcoidosis      SOCIAL HISTORY:  Smoking Status: [ ] Current, [ ] Former, [X ] Never  Pack Years:    MEDICATIONS:  MEDICATIONS  (STANDING):  folic acid 1 milliGRAM(s) Oral daily  influenza   Vaccine 0.5 milliLiter(s) IntraMuscular once  labetalol 100 milliGRAM(s) Oral two times a day  lactated ringers. 1000 milliLiter(s) (75 mL/Hr) IV Continuous <Continuous>  pantoprazole  Injectable 40 milliGRAM(s) IV Push two times a day  prednisoLONE acetate 1% Suspension 1 Drop(s) Both EYES four times a day    MEDICATIONS  (PRN):  acetaminophen   Tablet .. 650 milliGRAM(s) Oral every 6 hours PRN Moderate Pain (4 - 6)  ondansetron Injectable 4 milliGRAM(s) IV Push every 6 hours PRN Nausea and/or Vomiting  oxyCODONE    IR 5 milliGRAM(s) Oral every 6 hours PRN Severe Pain (7 - 10)      Allergies    No Known Allergies    Intolerances        Vital Signs Last 24 Hrs  T(C): 37.4 (18 Sep 2019 05:03), Max: 38 (17 Sep 2019 17:49)  T(F): 99.4 (18 Sep 2019 05:03), Max: 100.4 (17 Sep 2019 17:49)  HR: 87 (18 Sep 2019 05:53) (82 - 107)  BP: 134/91 (18 Sep 2019 05:53) (124/83 - 149/87)  BP(mean): --  RR: 20 (18 Sep 2019 05:03) (16 - 20)  SpO2: 100% (18 Sep 2019 05:03) (97% - 100%)    -17 @ 07:01  -  09-18 @ 07:00  --------------------------------------------------------  IN: 0 mL / OUT: 200 mL / NET: -200 mL          PHYSICAL EXAM:    General: Well developed; well nourished; in no acute distress  HEENT: MMM, conjunctiva and sclera clear  Gastrointestinal: Soft, non-tender non-distended; Normal bowel sounds; No rebound or guarding  Extremities: Normal range of motion, No clubbing, cyanosis or edema  Neurological: Alert and oriented x3  Skin: Warm and dry. No obvious rash  Joints: no nodules, nontender, no swelling       LABS:                        9.3    10.66 )-----------( 310      ( 18 Sep 2019 07:22 )             29.7         138  |  101  |  12  ----------------------------<  111<H>  4.2   |  21<L>  |  1.65<H>    Ca    8.9      18 Sep 2019 07:22  Phos  3.7       Mg     2.2         TPro  7.4  /  Alb  3.4  /  TBili  0.3  /  DBili  x   /  AST  20  /  ALT  22  /  AlkPhos  122<H>      HIV-1/2 Combo Result: Nonreactive     Protein Total, Serum: 8.5 g/dL (19 @ 15:02)    Albumin, Serum: 4.0 g/dL (19 @ 15:02)    Comprehensive Metabolic Panel (19 @ 15:02)    Sodium, Serum: 136 mmol/L    Potassium, Serum: 3.7 mmol/L    Chloride, Serum: 99 mmol/L    Carbon Dioxide, Serum: 24 mmol/L    Anion Gap, Serum: 13 mmo/L    Blood Urea Nitrogen, Serum: 14 mg/dL    Creatinine, Serum: 1.80 mg/dL    Glucose, Serum: 118 mg/dL    Calcium, Total Serum: 9.2 mg/dL    Protein Total, Serum: 8.5 g/dL    Albumin, Serum: 4.0 g/dL    Bilirubin Total, Serum: 0.3 mg/dL    Alkaline Phosphatase, Serum: 139 u/L    Aspartate Aminotransferase (AST/SGOT): 21 u/L    Alanine Aminotransferase (ALT/SGPT): 24 u/L    eGFR if Non : 33:    Complete Blood Count + Automated Diff (19 @ 15:02)    Nucleated RBC #: 0 K/uL    WBC Count: 12.06 K/uL    RBC Count: 3.89 M/uL    Hemoglobin: 10.2 g/dL    Hematocrit: 32.4 %    Mean Cell Volume: 83.3 fL    Mean Cell Hemoglobin: 26.2 pg    Mean Cell Hemoglobin Conc: 31.5 %    Red Cell Distrib Width: 14.4 %    Platelet Count - Automated: 361 K/uL    MPV: 10.1 fl    Auto Neutrophil #: 9.30 K/uL    Auto Lymphocyte #: 1.43 K/uL    Auto Monocyte #: 1.02 K/uL    Auto Eosinophil #: 0.16 K/uL    Auto Basophil #: 0.09 K/uL    Auto Neutrophil %: 77.1 %    Auto Lymphocyte %: 11.9 %    Auto Monocyte %: 8.5 %    Auto Eosinophil %: 1.3 %    Auto Basophil %: 0.7 %    Auto Immature Granulocyte %: 0.5: (Includes meta, myelo and promyelocytes) %                  RADIOLOGY & ADDITIONAL STUDIES:   < from: Xray Chest 2 Views PA/Lat (19 @ 15:06) >  INTERPRETATION:  CLINICAL INDICATION: cough    EXAM:  Frontal and lateral chest from 2019 at 1506. No prior chest x-ray   available at this institution for comparison.    IMPRESSION:  Clear lungs. No pleural effusions or pneumothorax.    Cardiac and mediastinal silhouettes within normal limits.    Trachea midline.    Trace scoliotic spinal curvature. Unremarkable remaining visualized   osseous structures. Patient is a 49y old  Female who presents with a chief complaint of Hemoptysis (18 Sep 2019 09:18)      HPI:  50 yo woman with history of HTN and migraine headaches (well controlled for past 2 years) presents with new onset of hemoptysis while pt was at her ophthalmologist's office on Tuesday. Pt had an appointment with ophthalmology due to unresolving b/l eye pain, redness, tearing, and photophobia that pt has had since 19. Pt initially came to [facility] ED on 19 for those eye symptoms along with rhinorrhea and was diagnosed with acute viral conjunctivitis, for which pt was prescribed Trimethoprim and Polymyxin B eye drops and Flonase. However, pt's symptoms failed to improve, with pt returning to the ED on 19 for migraine-like headaches, nausea, and NBNB vomiting, in addition to the persistent b/l eye pain, though pt was subsequently discharged home from the ED after pt felt better with pain meds. Pt was eventually referred to ophthalmology by her PCP and had her first visit on Tuesday. At the visit, pt was informed that her eye symptoms were not due to conjunctivitis but from iritis and anterior uveitis, concerning for an autoimmune disorder, with pt prescribed Prednisolone eye drops. While pt was standing by the reception desk following her appointment, pt started coughing, bringing up sputum mixed with bright red blood. Pt had 3 more episodes of hemoptysis in the ED, each time bringing up a dime-sized amount of bright red blood. Pt denies any associated CP, SOB, palpitations, or LE edema, erythema, or pain but reports that shortly after onset of her eye symptoms on 19, pt had intense episodes of fevers, chills, and night sweats, soaking her sheets. Pt also notes that she had severe nausea with ~8 episodes of NBNB vomiting from Monday afternoon to Tuesday morning, resolving just prior to her ophthalmology appointment. She reports the vomiting occurred after eating a beef noelle. She endorses dry mouth in the past but believed this to be side effect of HTN medication. She endorses night sweats for the past 2 weeks. Also endorses cold symptoms, dyspnea, cough productive of yellow mucus in August that self-resolved. she denies abdominal pain, diarrhea, urinary symptoms, hematuria, oral ulcers, rash weight loss, joint pain. She reports she has a daughter with sarcoidosis diagnosed in her 20s and her mother has arthritis.      Of note, pt denies any recent travel or known sick contacts but works for the [employer] in which she visits different health care facilities and prisons.    In the ED,  T 100.4, HR , -149/86-90, RR 16-17, O2 sats % RA. WBC 12.06. CXR clear without any cavitary lesions. (17 Sep 2019 22:27). Patient received ceftriaxone and azithromycin, 1L NS, tylenol, and zofran in the ED.     Hospital course: Complement levels WNL, elevated ESR/CRP. Further Autoimmune workup, r/o TB, in progress.     REVIEW OF SYSTEMS:  Constitutional: +Fevers/chills. No weakness or weight loss.  	Eyes: +B/l eye pain, redness, tearing, and photophobia. No visual changes or double vision.  	Ears, Nose, Mouth, Throat: +Runny nose (now resolved). No sinus pain, ear pain, tinnitus, sore throat, dysphagia, or odynophagia.  	Cardiovascular: No chest pain, palpitations, or LE edema  	Respiratory: +Hemoptysis. No wheezing or shortness of breath.  	Gastrointestinal: +Nausea/vomiting (now resolved). No abdominal pain, diarrhea/constipation, hematemesis, BRBPR, or melena.  	Genitourinary: No dysuria, frequency, urgency, or hematuria  	Musculoskeletal: No joint pain, joint swelling, or decreased ROM  	Skin: No pruritus or rashes  	Neurologic: +Chronic migraine headache. No seizures, paresthesias, numbness, or limb weakness.  	Psychiatric: No depression, anxiety, or agitation  	Endocrine: No heat/cold intolerance, mood swings, sweats, polydipsia, or polyuria  	Hematologic/lymphatic: No purpura, petechia, or prolonged or excessive bleeding after dental extraction / injury  	Allergic/Immunologic: No anaphylaxis or allergic response to materials, foods, animals      PAST MEDICAL & SURGICAL HISTORY:  Migraine headache  HTN (hypertension)  History of rotator cuff surgery  Delivery with history of       FAMILY HISTORY:  Family history of hypertension  Family history of type 2 diabetes mellitus  Daughter with sarcoidosis      SOCIAL HISTORY:  Smoking Status: [ ] Current, [ ] Former, [X ] Never  Pack Years:    MEDICATIONS:  MEDICATIONS  (STANDING):  folic acid 1 milliGRAM(s) Oral daily  influenza   Vaccine 0.5 milliLiter(s) IntraMuscular once  labetalol 100 milliGRAM(s) Oral two times a day  lactated ringers. 1000 milliLiter(s) (75 mL/Hr) IV Continuous <Continuous>  pantoprazole  Injectable 40 milliGRAM(s) IV Push two times a day  prednisoLONE acetate 1% Suspension 1 Drop(s) Both EYES four times a day    MEDICATIONS  (PRN):  acetaminophen   Tablet .. 650 milliGRAM(s) Oral every 6 hours PRN Moderate Pain (4 - 6)  ondansetron Injectable 4 milliGRAM(s) IV Push every 6 hours PRN Nausea and/or Vomiting  oxyCODONE    IR 5 milliGRAM(s) Oral every 6 hours PRN Severe Pain (7 - 10)      Allergies    No Known Allergies    Intolerances        Vital Signs Last 24 Hrs  T(C): 37.4 (18 Sep 2019 05:03), Max: 38 (17 Sep 2019 17:49)  T(F): 99.4 (18 Sep 2019 05:03), Max: 100.4 (17 Sep 2019 17:49)  HR: 87 (18 Sep 2019 05:53) (82 - 107)  BP: 134/91 (18 Sep 2019 05:53) (124/83 - 149/87)  BP(mean): --  RR: 20 (18 Sep 2019 05:03) (16 - 20)  SpO2: 100% (18 Sep 2019 05:03) (97% - 100%)    -17 @ 07:01  -  09-18 @ 07:00  --------------------------------------------------------  IN: 0 mL / OUT: 200 mL / NET: -200 mL          PHYSICAL EXAM:    General: Well developed; well nourished; in no acute distress  HEENT: MMM, conjunctiva and sclera clear  Gastrointestinal: Soft, non-tender non-distended; Normal bowel sounds; No rebound or guarding  Extremities: Normal range of motion, No clubbing, cyanosis or edema  Neurological: Alert and oriented x3  Skin: Warm and dry. No obvious rash  Joints: no nodules, nontender, no swelling       LABS:                        9.3    10.66 )-----------( 310      ( 18 Sep 2019 07:22 )             29.7         138  |  101  |  12  ----------------------------<  111<H>  4.2   |  21<L>  |  1.65<H>    Ca    8.9      18 Sep 2019 07:22  Phos  3.7       Mg     2.2         TPro  7.4  /  Alb  3.4  /  TBili  0.3  /  DBili  x   /  AST  20  /  ALT  22  /  AlkPhos  122<H>      HIV-1/2 Combo Result: Nonreactive     Protein Total, Serum: 8.5 g/dL (19 @ 15:02)    Albumin, Serum: 4.0 g/dL (19 @ 15:02)    Comprehensive Metabolic Panel (19 @ 15:02)    Sodium, Serum: 136 mmol/L    Potassium, Serum: 3.7 mmol/L    Chloride, Serum: 99 mmol/L    Carbon Dioxide, Serum: 24 mmol/L    Anion Gap, Serum: 13 mmo/L    Blood Urea Nitrogen, Serum: 14 mg/dL    Creatinine, Serum: 1.80 mg/dL    Glucose, Serum: 118 mg/dL    Calcium, Total Serum: 9.2 mg/dL    Protein Total, Serum: 8.5 g/dL    Albumin, Serum: 4.0 g/dL    Bilirubin Total, Serum: 0.3 mg/dL    Alkaline Phosphatase, Serum: 139 u/L    Aspartate Aminotransferase (AST/SGOT): 21 u/L    Alanine Aminotransferase (ALT/SGPT): 24 u/L    eGFR if Non : 33:    Complete Blood Count + Automated Diff (19 @ 15:02)    Nucleated RBC #: 0 K/uL    WBC Count: 12.06 K/uL    RBC Count: 3.89 M/uL    Hemoglobin: 10.2 g/dL    Hematocrit: 32.4 %    Mean Cell Volume: 83.3 fL    Mean Cell Hemoglobin: 26.2 pg    Mean Cell Hemoglobin Conc: 31.5 %    Red Cell Distrib Width: 14.4 %    Platelet Count - Automated: 361 K/uL    MPV: 10.1 fl    Auto Neutrophil #: 9.30 K/uL    Auto Lymphocyte #: 1.43 K/uL    Auto Monocyte #: 1.02 K/uL    Auto Eosinophil #: 0.16 K/uL    Auto Basophil #: 0.09 K/uL    Auto Neutrophil %: 77.1 %    Auto Lymphocyte %: 11.9 %    Auto Monocyte %: 8.5 %    Auto Eosinophil %: 1.3 %    Auto Basophil %: 0.7 %    Auto Immature Granulocyte %: 0.5: (Includes meta, myelo and promyelocytes) %        RADIOLOGY & ADDITIONAL STUDIES:   < from: Xray Chest 2 Views PA/Lat (19 @ 15:06) >  INTERPRETATION:  CLINICAL INDICATION: cough    EXAM:  Frontal and lateral chest from 2019 at 1506. No prior chest x-ray   available at this institution for comparison.    IMPRESSION:  Clear lungs. No pleural effusions or pneumothorax.    Cardiac and mediastinal silhouettes within normal limits.    Trachea midline.    Trace scoliotic spinal curvature. Unremarkable remaining visualized   osseous structures.      < from: CT Chest No Cont (19 @ 12:18) >  Mild upper thoracic spine scoliosis.    IMPRESSION: Right upper lobe mixed solid and groundglass patchy   groundglass opacity likely pneumonia. A 1 to 3 month follow-up chest CT   is recommended to ensure resolution and exclude primary lung neoplasm.    Mediastinal and hilar lymph nodes and 3 tiny left lung pulmonary nodules   can be monitored on the follow-up CT.    2 x 1.1 cm left breast nodule. Correlation with mammogram and/or   ultrasound is recommended for complete evaluation. Patient is a 49y old  Female who presents with a chief complaint of Hemoptysis (18 Sep 2019 09:18)      HPI:  50 yo woman with history of HTN and migraine headaches (well controlled for past 2 years) presents with new onset of hemoptysis while pt was at her ophthalmologist's office on Tuesday. Pt had an appointment with ophthalmology due to unresolving b/l eye pain, redness, tearing, and photophobia that pt has had since 19. Pt initially came to [facility] ED on 19 for those eye symptoms along with rhinorrhea and was diagnosed with acute viral conjunctivitis, for which pt was prescribed Trimethoprim and Polymyxin B eye drops and Flonase. However, pt's symptoms failed to improve, with pt returning to the ED on 19 for migraine-like headaches, nausea, and NBNB vomiting, in addition to the persistent b/l eye pain, though pt was subsequently discharged home from the ED after pt felt better with pain meds. Pt was eventually referred to ophthalmology by her PCP and had her first visit on Tuesday. At the visit, pt was informed that her eye symptoms were not due to conjunctivitis but from iritis and anterior uveitis, concerning for an autoimmune disorder, with pt prescribed Prednisolone eye drops. While pt was standing by the reception desk following her appointment, pt started coughing, bringing up sputum mixed with bright red blood. Pt had 3 more episodes of hemoptysis in the ED, each time bringing up a dime-sized amount of bright red blood. Pt denies any associated CP, SOB, palpitations, or LE edema, erythema, or pain but reports that shortly after onset of her eye symptoms on 19, pt had intense episodes of fevers, chills, and night sweats, soaking her sheets. Pt also notes that she had severe nausea with ~8 episodes of NBNB vomiting from Monday afternoon to Tuesday morning, resolving just prior to her ophthalmology appointment. She reports the vomiting occurred after eating a beef noelle. She endorses dry mouth in the past but believed this to be side effect of HTN medication. She endorses night sweats for the past 2 weeks. Also endorses cold symptoms, dyspnea, cough productive of yellow mucus in August that self-resolved. she denies abdominal pain, diarrhea, urinary symptoms, hematuria, oral ulcers, rash weight loss, joint pain. She reports she has a daughter with sarcoidosis diagnosed in her 20s and her mother has arthritis.      Of note, pt denies any recent travel or known sick contacts but works for the [employer] in which she visits different health care facilities and prisons.    In the ED,  T 100.4, HR , -149/86-90, RR 16-17, O2 sats % RA. WBC 12.06. CXR clear without any cavitary lesions. (17 Sep 2019 22:27). Patient received ceftriaxone and azithromycin, 1L NS, tylenol, and zofran in the ED.     Hospital course: Complement levels WNL, elevated ESR/CRP. Further Autoimmune workup, r/o TB, in progress.     REVIEW OF SYSTEMS:  Constitutional: +Fevers/chills. No weakness or weight loss.  	Eyes: +B/l eye pain, redness, tearing, and photophobia.  	Ears, Nose, Mouth, Throat: +Runny nose (now resolved). reports chronic sinusitis   	Cardiovascular: No chest pain, palpitations, or LE edema  	Respiratory: +Hemoptysis. No wheezing or shortness of breath.  	Gastrointestinal: +Nausea/vomiting (now resolved). No abdominal pain, diarrhea/constipation, hematemesis, BRBPR, or melena.  	Genitourinary: No dysuria, frequency, urgency, or hematuria  	Musculoskeletal: No joint pain, joint swelling, or decreased ROM  	Skin: No pruritus or rashes  	Neurologic: +Chronic migraine headache. No seizures, paresthesias, numbness, or limb weakness.  	Psychiatric: No depression, anxiety, or agitation  	Endocrine: No heat/cold intolerance, mood swings, sweats, polydipsia, or polyuria  	Hematologic/lymphatic: No purpura, petechia, or prolonged or excessive bleeding after dental extraction / injury  	Allergic/Immunologic: No anaphylaxis or allergic response to materials, foods, animals      PAST MEDICAL & SURGICAL HISTORY:  Migraine headache  HTN (hypertension)  History of rotator cuff surgery  Delivery with history of       FAMILY HISTORY:  Family history of hypertension  Family history of type 2 diabetes mellitus  Daughter with sarcoidosis      SOCIAL HISTORY:  Smoking Status: [ ] Current, [ ] Former, [X ] Never  Pack Years:    MEDICATIONS:  MEDICATIONS  (STANDING):  folic acid 1 milliGRAM(s) Oral daily  influenza   Vaccine 0.5 milliLiter(s) IntraMuscular once  labetalol 100 milliGRAM(s) Oral two times a day  lactated ringers. 1000 milliLiter(s) (75 mL/Hr) IV Continuous <Continuous>  pantoprazole  Injectable 40 milliGRAM(s) IV Push two times a day  prednisoLONE acetate 1% Suspension 1 Drop(s) Both EYES four times a day    MEDICATIONS  (PRN):  acetaminophen   Tablet .. 650 milliGRAM(s) Oral every 6 hours PRN Moderate Pain (4 - 6)  ondansetron Injectable 4 milliGRAM(s) IV Push every 6 hours PRN Nausea and/or Vomiting  oxyCODONE    IR 5 milliGRAM(s) Oral every 6 hours PRN Severe Pain (7 - 10)      Allergies    No Known Allergies    Intolerances        Vital Signs Last 24 Hrs  T(C): 37.4 (18 Sep 2019 05:03), Max: 38 (17 Sep 2019 17:49)  T(F): 99.4 (18 Sep 2019 05:03), Max: 100.4 (17 Sep 2019 17:49)  HR: 87 (18 Sep 2019 05:53) (82 - 107)  BP: 134/91 (18 Sep 2019 05:53) (124/83 - 149/87)  BP(mean): --  RR: 20 (18 Sep 2019 05:03) (16 - 20)  SpO2: 100% (18 Sep 2019 05:03) (97% - 100%)     @ 07:01  -  -18 @ 07:00  --------------------------------------------------------  IN: 0 mL / OUT: 200 mL / NET: -200 mL          PHYSICAL EXAM:    General: Well developed; well nourished; in no acute distress  HEENT: MMM, injected conjunctiva  Gastrointestinal: Soft, non-tender non-distended; Normal bowel sounds; No rebound or guarding  Extremities: Normal range of motion, No clubbing, cyanosis or edema  Neurological: Alert and oriented x3  Skin: Warm and dry. No obvious rash  Joints: no nodules, nontender, no swelling       LABS:                        9.3    10.66 )-----------( 310      ( 18 Sep 2019 07:22 )             29.7         138  |  101  |  12  ----------------------------<  111<H>  4.2   |  21<L>  |  1.65<H>    Ca    8.9      18 Sep 2019 07:22  Phos  3.7       Mg     2.2         TPro  7.4  /  Alb  3.4  /  TBili  0.3  /  DBili  x   /  AST  20  /  ALT  22  /  AlkPhos  122<H>      HIV-1/2 Combo Result: Nonreactive     Protein Total, Serum: 8.5 g/dL (19 @ 15:02)    Albumin, Serum: 4.0 g/dL (19 @ 15:02)    Comprehensive Metabolic Panel (19 @ 15:02)    Sodium, Serum: 136 mmol/L    Potassium, Serum: 3.7 mmol/L    Chloride, Serum: 99 mmol/L    Carbon Dioxide, Serum: 24 mmol/L    Anion Gap, Serum: 13 mmo/L    Blood Urea Nitrogen, Serum: 14 mg/dL    Creatinine, Serum: 1.80 mg/dL    Glucose, Serum: 118 mg/dL    Calcium, Total Serum: 9.2 mg/dL    Protein Total, Serum: 8.5 g/dL    Albumin, Serum: 4.0 g/dL    Bilirubin Total, Serum: 0.3 mg/dL    Alkaline Phosphatase, Serum: 139 u/L    Aspartate Aminotransferase (AST/SGOT): 21 u/L    Alanine Aminotransferase (ALT/SGPT): 24 u/L    eGFR if Non : 33:    Complete Blood Count + Automated Diff (19 @ 15:02)    Nucleated RBC #: 0 K/uL    WBC Count: 12.06 K/uL    RBC Count: 3.89 M/uL    Hemoglobin: 10.2 g/dL    Hematocrit: 32.4 %    Mean Cell Volume: 83.3 fL    Mean Cell Hemoglobin: 26.2 pg    Mean Cell Hemoglobin Conc: 31.5 %    Red Cell Distrib Width: 14.4 %    Platelet Count - Automated: 361 K/uL    MPV: 10.1 fl    Auto Neutrophil #: 9.30 K/uL    Auto Lymphocyte #: 1.43 K/uL    Auto Monocyte #: 1.02 K/uL    Auto Eosinophil #: 0.16 K/uL    Auto Basophil #: 0.09 K/uL    Auto Neutrophil %: 77.1 %    Auto Lymphocyte %: 11.9 %    Auto Monocyte %: 8.5 %    Auto Eosinophil %: 1.3 %    Auto Basophil %: 0.7 %    Auto Immature Granulocyte %: 0.5: (Includes meta, myelo and promyelocytes) %        RADIOLOGY & ADDITIONAL STUDIES:   < from: Xray Chest 2 Views PA/Lat (19 @ 15:06) >  INTERPRETATION:  CLINICAL INDICATION: cough    EXAM:  Frontal and lateral chest from 2019 at 1506. No prior chest x-ray   available at this institution for comparison.    IMPRESSION:  Clear lungs. No pleural effusions or pneumothorax.    Cardiac and mediastinal silhouettes within normal limits.    Trachea midline.    Trace scoliotic spinal curvature. Unremarkable remaining visualized   osseous structures.      < from: CT Chest No Cont (19 @ 12:18) >  Mild upper thoracic spine scoliosis.    IMPRESSION: Right upper lobe mixed solid and groundglass patchy   groundglass opacity likely pneumonia. A 1 to 3 month follow-up chest CT   is recommended to ensure resolution and exclude primary lung neoplasm.    Mediastinal and hilar lymph nodes and 3 tiny left lung pulmonary nodules   can be monitored on the follow-up CT.    2 x 1.1 cm left breast nodule. Correlation with mammogram and/or   ultrasound is recommended for complete evaluation.

## 2019-09-18 NOTE — PROGRESS NOTE ADULT - PROBLEM SELECTOR PLAN 2
Pt with diagnosis of iritis and anterior uveitis by ophthalmology on Tuesday and prescribed Prednisolone eye drops.  - C/w Prednisolone 1% eye drops qid (prescription confirmed via ophthalmology office note in [facility])  - As anterior uveitis associated with autoimmune conditions, such as sarcoidosis, SLE, GPA, Goodpasture's, Behcet's, other vasculitides, IBD, and rheumatoid arthritis. Will check JULI, anti-dsDNA, C3/C4, c-ANCA, p-ANCA, RF, and anti-CCP.  - Rheumatology consult in AM  - GI consult in AM for possible workup of IBD as inpatient  - Pain control with Tylenol PRN for moderate pain and Oxycodone IR 5 mg q6hrs PRN for severe pain Sputum culture, prelim gram + cocci in pairs. Pt presented with WBC 12.06 with fever of 100.4F.  S/p ceftriaxone and azithromycin in the ED. CXR clear.  - Continued IV Ceftriaxone and IV Azithromycin   - TB quant test pending- high risk given occupation- pt on respiratory precaution and negative pressure isolation   - f/u urine cx, and blood cxs Sputum culture, prelim gram + cocci in pairs. Pt presented with WBC 12.06 with fever of 100.4F.  S/p ceftriaxone and azithromycin in the ED. CXR clear. CT of chest concerning for pneumonia.   - Will continued IV Ceftriaxone and IV Azithromycin for empiric treatment for CAP   - TB quant test pending- high risk given occupation- pt on respiratory precaution and negative pressure isolation   - f/u urine cx, and blood cxs Sputum culture, prelim gram + cocci in pairs. Pt presented with WBC 12.06 with fever of 100.4F.  S/p ceftriaxone and azithromycin in the ED. CXR clear. CT of chest concerning for pneumonia.   - Will continued IV Ceftriaxone and IV Azithromycin for empiric treatment for CAP   - TB quant test pending- high risk given occupation- pt on respiratory precaution and negative pressure isolation Sputum culture, prelim gram + cocci in pairs. Pt presented with WBC 12.06 with fever of 100.4F.  S/p ceftriaxone and azithromycin in the ED. CXR clear. CT of chest concerning for pneumonia.   - Will continued IV Ceftriaxone and IV Azithromycin for empiric treatment for CAP   - TB quant test pending- high risk given occupation- pt on respiratory precaution and negative pressure isolation  - f/u Bcx, Ucx

## 2019-09-18 NOTE — PROGRESS NOTE ADULT - SUBJECTIVE AND OBJECTIVE BOX
SHERLYN BAZZI  49y  Female    Patient is a 49y old  Female who presents with a chief complaint of Hemoptysis (17 Sep 2019 22:27)      INTERVAL HPI/OVERNIGHT EVENTS:    REVIEW OF SYSTEMS:    ALLERY AND IMMUNOLOGIC: No hives or eczema    T(C): 37.4 (19 @ 05:03), Max: 38 (19 @ 17:49)  HR: 87 (19 @ 05:53) (82 - 107)  BP: 134/91 (19 @ 05:53) (124/83 - 149/87)  RR: 20 (19 @ 05:03) (16 - 20)  SpO2: 100% (19 @ 05:03) (97% - 100%)  Wt(kg): --Vital Signs Last 24 Hrs  T(C): 37.4 (18 Sep 2019 05:03), Max: 38 (17 Sep 2019 17:49)  T(F): 99.4 (18 Sep 2019 05:03), Max: 100.4 (17 Sep 2019 17:49)  HR: 87 (18 Sep 2019 05:53) (82 - 107)  BP: 134/91 (18 Sep 2019 05:53) (124/83 - 149/87)  BP(mean): --  RR: 20 (18 Sep 2019 05:03) (16 - 20)  SpO2: 100% (18 Sep 2019 05:03) (97% - 100%)    PHYSICAL EXAM:  GENERAL: NAD, well-groomed, well-developed  HEAD:  Atraumatic, Normocephalic  EYES: EOMI, PERRLA, conjunctiva and sclera clear  ENMT: No tonsillar erythema, exudates, or enlargement; Moist mucous membranes, Good dentition, No lesions  NECK: Supple, No JVD, Normal thyroid  NERVOUS SYSTEM:  Alert & Oriented X3, Good concentration; Motor Strength 5/5 B/L upper and lower extremities; DTRs 2+ intact and symmetric  CHEST/LUNG: Clear to percussion bilaterally; No rales, rhonchi, wheezing, or rubs  HEART: Regular rate and rhythm; No murmurs, rubs, or gallops  ABDOMEN: Soft, Nontender, Nondistended; Bowel sounds present  EXTREMITIES:  2+ Peripheral Pulses, No clubbing, cyanosis, or edema  LYMPH: No lymphadenopathy noted  SKIN: No rashes or lesions    Consultant(s) Notes Reviewed:  [x ] YES  [ ] NO  Care Discussed with Consultants/Other Providers [ x] YES  [ ] NO    LABS:                        9.3    10.66 )-----------( 310      ( 18 Sep 2019 07:22 )             29.7         138  |  101  |  12  ----------------------------<  111<H>  4.2   |  21<L>  |  1.65<H>    Ca    8.9      18 Sep 2019 07:22  Phos  3.7       Mg     2.2         TPro  7.4  /  Alb  3.4  /  TBili  0.3  /  DBili  x   /  AST  20  /  ALT  22  /  AlkPhos  122<H>      PT/INR - ( 17 Sep 2019 15:02 )   PT: 12.4 SEC;   INR: 1.11       PTT - ( 17 Sep 2019 15:02 )  PTT:49.0 SEC  Urinalysis Basic - ( 18 Sep 2019 01:52 )    Color: LT. YELLOW / Appearance: CLEAR / S.006 / pH: 7.0  Gluc: NEGATIVE / Ketone: NEGATIVE  / Bili: NEGATIVE / Urobili: NORMAL   Blood: NEGATIVE / Protein: NEGATIVE / Nitrite: NEGATIVE   Leuk Esterase: SMALL / RBC: 0-2 / WBC 6-10   Sq Epi: OCC / Non Sq Epi: x / Bacteria: NEGATIVE      CAPILLARY BLOOD GLUCOSE        Urinalysis Basic - ( 18 Sep 2019 01:52 )    Color: LT. YELLOW / Appearance: CLEAR / S.006 / pH: 7.0  Gluc: NEGATIVE / Ketone: NEGATIVE  / Bili: NEGATIVE / Urobili: NORMAL   Blood: NEGATIVE / Protein: NEGATIVE / Nitrite: NEGATIVE   Leuk Esterase: SMALL / RBC: 0-2 / WBC 6-10   Sq Epi: OCC / Non Sq Epi: x / Bacteria: NEGATIVE      RADIOLOGY & ADDITIONAL TESTS:    Imaging Personally Reviewed:  [ ] YES  [ ] NO    HEALTH ISSUES - PROBLEM Dx:  Need for prophylactic measure: Need for prophylactic measure  Hypertension: Hypertension  Anemia: Anemia  KAN (acute kidney injury): KAN (acute kidney injury)  Leukocytosis: Leukocytosis  Uveitis, anterior: Uveitis, anterior  Hemoptysis: Hemoptysis SHERLYN BAZZI  49y  Female    Patient is a 49y old  Female who presents with a chief complaint of Hemoptysis (17 Sep 2019 22:27)      INTERVAL HPI/OVERNIGHT EVENTS: No acute events overnight.     History obtained, patient reports family history of autoimmune disorder including,     REVIEW OF SYSTEMS:    ALLERY AND IMMUNOLOGIC: No hives or eczema    T(C): 37.4 (19 @ 05:03), Max: 38 (19 @ 17:49)  HR: 87 (19 @ 05:53) (82 - 107)  BP: 134/91 (19 @ 05:53) (124/83 - 149/87)  RR: 20 (19 @ 05:03) (16 - 20)  SpO2: 100% (19 @ 05:03) (97% - 100%)  Wt(kg): --Vital Signs Last 24 Hrs  T(C): 37.4 (18 Sep 2019 05:03), Max: 38 (17 Sep 2019 17:49)  T(F): 99.4 (18 Sep 2019 05:03), Max: 100.4 (17 Sep 2019 17:49)  HR: 87 (18 Sep 2019 05:53) (82 - 107)  BP: 134/91 (18 Sep 2019 05:53) (124/83 - 149/87)  BP(mean): --  RR: 20 (18 Sep 2019 05:03) (16 - 20)  SpO2: 100% (18 Sep 2019 05:03) (97% - 100%)    PHYSICAL EXAM:  GENERAL: NAD, well-groomed, well-developed  HEAD:  Atraumatic, Normocephalic  EYES: EOMI, conjunctivitis    NECK: Supple,   NERVOUS SYSTEM:  Alert & Oriented X3, Good concentration; Motor Strength 5/5 B/L upper and lower extremities  CHEST/LUNG: Breathing comfortably, No rales, rhonchi, wheezing, or rubs  HEART: Regular rate and rhythm; No murmurs, rubs, or gallops  ABDOMEN: Soft, Nontender, Nondistended; Bowel sounds present  EXTREMITIES:  2+ Peripheral Pulses, No clubbing, cyanosis, or edema  LYMPH:   SKIN: No rashes or lesions    Consultant(s) Notes Reviewed:  [x ] YES  [ ] NO  Care Discussed with Consultants/Other Providers [ x] YES  [ ] NO    LABS:                        9.3    10.66 )-----------( 310      ( 18 Sep 2019 07:22 )             29.7         138  |  101  |  12  ----------------------------<  111<H>  4.2   |  21<L>  |  1.65<H>    Ca    8.9      18 Sep 2019 07:22  Phos  3.7       Mg     2.2         TPro  7.4  /  Alb  3.4  /  TBili  0.3  /  DBili  x   /  AST  20  /  ALT  22  /  AlkPhos  122<H>      PT/INR - ( 17 Sep 2019 15:02 )   PT: 12.4 SEC;   INR: 1.11       PTT - ( 17 Sep 2019 15:02 )  PTT:49.0 SEC  Urinalysis Basic - ( 18 Sep 2019 01:52 )    Color: LT. YELLOW / Appearance: CLEAR / S.006 / pH: 7.0  Gluc: NEGATIVE / Ketone: NEGATIVE  / Bili: NEGATIVE / Urobili: NORMAL   Blood: NEGATIVE / Protein: NEGATIVE / Nitrite: NEGATIVE   Leuk Esterase: SMALL / RBC: 0-2 / WBC 6-10   Sq Epi: OCC / Non Sq Epi: x / Bacteria: NEGATIVE      CAPILLARY BLOOD GLUCOSE        Urinalysis Basic - ( 18 Sep 2019 01:52 )    Color: LT. YELLOW / Appearance: CLEAR / S.006 / pH: 7.0  Gluc: NEGATIVE / Ketone: NEGATIVE  / Bili: NEGATIVE / Urobili: NORMAL   Blood: NEGATIVE / Protein: NEGATIVE / Nitrite: NEGATIVE   Leuk Esterase: SMALL / RBC: 0-2 / WBC 6-10   Sq Epi: OCC / Non Sq Epi: x / Bacteria: NEGATIVE    RADIOLOGY & ADDITIONAL TESTS:    Imaging Personally Reviewed:  [x ] YES  [ ] NO      EXAM:  CT CHEST        PROCEDURE DATE:  Sep 18 2019       INTERPRETATION:  CLINICAL INDICATION: Hemoptysis.    Axial CT images of the chest are obtained without intravenous   administration of contrast.    No prior chest CTs are available for comparison.    Small or benign-appearing bilateral axillary lymph nodes. Nonspecific   mediastinal lymph nodes with the largest in the right paratracheal   location measuring about 9 mm in short axis. Bilateral hilar lymph nodes   with the largest measuring about 1 cm within the right hilum, likely   reactive.    No pericardial effusion. No pleural effusions.    Evaluation of the upper abdominal organs demonstrate a 9 mm right renal   hypodensity likely a cyst. 2 X 1.1 cm nodule within the inferior aspect   of the left breast.    Evaluation of the lungs demonstrate patchy right upper lobe mixed solid   and groundglass opacity. 3 mm left upper lobe peripheral pulmonary nodule   on image 26 of series 2. Two adjacent tiny nodules within the left lower   lobe on image 83 of series 2 measuring up to 4 mm. No central   endobronchial lesions.    Mild upper thoracic spine scoliosis.    IMPRESSION: Right upper lobe mixed solid and groundglass patchy   groundglass opacity likely pneumonia. A 1 to 3 month follow-up chest CT   is recommended to ensure resolution and exclude primary lung neoplasm.    Mediastinal and hilar lymph nodes and 3 tiny left lung pulmonary nodules   can be monitored on the follow-up CT.    2 x 1.1 cm left breast nodule. Correlation with mammogram and/or   ultrasound is recommended for complete evaluation.    RISHI BENNETT M.D. ATTENDING RADIOLOGIST  This document has been electronically signed. Sep 18 2019  1:59PM        HEALTH ISSUES - PROBLEM Dx:  Need for prophylactic measure: Need for prophylactic measure  Hypertension: Hypertension  Anemia: Anemia  KAN (acute kidney injury): KAN (acute kidney injury)  Leukocytosis: Leukocytosis  Uveitis, anterior: Uveitis, anterior  Hemoptysis: Hemoptysis SHERLYN BAZZI  49y  Female    Patient is a 49y old  Female who presents with a chief complaint of Hemoptysis (17 Sep 2019 22:27)      INTERVAL HPI/OVERNIGHT EVENTS: No acute events overnight. Reports sputum with "clot"     History obtained, patient reports family history of autoimmune disorder including, sarcoidosis in her daughter and maternal history of sarcoids     REVIEW OF SYSTEMS:  Otherwise negative.     T(C): 37.4 (19 @ 05:03), Max: 38 (19 @ 17:49)  HR: 87 (19 @ 05:53) (82 - 107)  BP: 134/91 (19 @ 05:53) (124/83 - 149/87)  RR: 20 (19 @ 05:03) (16 - 20)  SpO2: 100% (19 @ 05:03) (97% - 100%)  Wt(kg): --Vital Signs Last 24 Hrs  T(C): 37.4 (18 Sep 2019 05:03), Max: 38 (17 Sep 2019 17:49)  T(F): 99.4 (18 Sep 2019 05:03), Max: 100.4 (17 Sep 2019 17:49)  HR: 87 (18 Sep 2019 05:53) (82 - 107)  BP: 134/91 (18 Sep 2019 05:53) (124/83 - 149/87)  BP(mean): --  RR: 20 (18 Sep 2019 05:03) (16 - 20)  SpO2: 100% (18 Sep 2019 05:03) (97% - 100%)    PHYSICAL EXAM:  GENERAL: NAD, well-groomed, well-developed  HEAD:  Atraumatic, Normocephalic  EYES: EOMI, conjunctivitis    NECK: Supple,   NERVOUS SYSTEM:  Alert & Oriented X3, Good concentration; Motor Strength 5/5 B/L upper and lower extremities  CHEST/LUNG: Breathing comfortably, No rales, rhonchi, wheezing, or rubs  HEART: Regular rate and rhythm; No murmurs, rubs, or gallops  ABDOMEN: Soft, Nontender, Nondistended; Bowel sounds present  EXTREMITIES:  2+ Peripheral Pulses, No clubbing, cyanosis, or edema  LYMPH:   SKIN: No rashes or lesions    Consultant(s) Notes Reviewed:  [x ] YES  [ ] NO  Care Discussed with Consultants/Other Providers [ x] YES  [ ] NO    LABS:                        9.3    10.66 )-----------( 310      ( 18 Sep 2019 07:22 )             29.7         138  |  101  |  12  ----------------------------<  111<H>  4.2   |  21<L>  |  1.65<H>    Ca    8.9      18 Sep 2019 07:22  Phos  3.7       Mg     2.2         TPro  7.4  /  Alb  3.4  /  TBili  0.3  /  DBili  x   /  AST  20  /  ALT  22  /  AlkPhos  122<H>      PT/INR - ( 17 Sep 2019 15:02 )   PT: 12.4 SEC;   INR: 1.11       PTT - ( 17 Sep 2019 15:02 )  PTT:49.0 SEC  Urinalysis Basic - ( 18 Sep 2019 01:52 )    Color: LT. YELLOW / Appearance: CLEAR / S.006 / pH: 7.0  Gluc: NEGATIVE / Ketone: NEGATIVE  / Bili: NEGATIVE / Urobili: NORMAL   Blood: NEGATIVE / Protein: NEGATIVE / Nitrite: NEGATIVE   Leuk Esterase: SMALL / RBC: 0-2 / WBC 6-10   Sq Epi: OCC / Non Sq Epi: x / Bacteria: NEGATIVE      CAPILLARY BLOOD GLUCOSE        Urinalysis Basic - ( 18 Sep 2019 01:52 )    Color: LT. YELLOW / Appearance: CLEAR / S.006 / pH: 7.0  Gluc: NEGATIVE / Ketone: NEGATIVE  / Bili: NEGATIVE / Urobili: NORMAL   Blood: NEGATIVE / Protein: NEGATIVE / Nitrite: NEGATIVE   Leuk Esterase: SMALL / RBC: 0-2 / WBC 6-10   Sq Epi: OCC / Non Sq Epi: x / Bacteria: NEGATIVE    RADIOLOGY & ADDITIONAL TESTS:    Imaging Personally Reviewed:  [x ] YES  [ ] NO      EXAM:  CT CHEST        PROCEDURE DATE:  Sep 18 2019       INTERPRETATION:  CLINICAL INDICATION: Hemoptysis.    Axial CT images of the chest are obtained without intravenous   administration of contrast.    No prior chest CTs are available for comparison.    Small or benign-appearing bilateral axillary lymph nodes. Nonspecific   mediastinal lymph nodes with the largest in the right paratracheal   location measuring about 9 mm in short axis. Bilateral hilar lymph nodes   with the largest measuring about 1 cm within the right hilum, likely   reactive.    No pericardial effusion. No pleural effusions.    Evaluation of the upper abdominal organs demonstrate a 9 mm right renal   hypodensity likely a cyst. 2 X 1.1 cm nodule within the inferior aspect   of the left breast.    Evaluation of the lungs demonstrate patchy right upper lobe mixed solid   and groundglass opacity. 3 mm left upper lobe peripheral pulmonary nodule   on image 26 of series 2. Two adjacent tiny nodules within the left lower   lobe on image 83 of series 2 measuring up to 4 mm. No central   endobronchial lesions.    Mild upper thoracic spine scoliosis.    IMPRESSION: Right upper lobe mixed solid and groundglass patchy   groundglass opacity likely pneumonia. A 1 to 3 month follow-up chest CT   is recommended to ensure resolution and exclude primary lung neoplasm.    Mediastinal and hilar lymph nodes and 3 tiny left lung pulmonary nodules   can be monitored on the follow-up CT.    2 x 1.1 cm left breast nodule. Correlation with mammogram and/or   ultrasound is recommended for complete evaluation.    RISHI BENNETT M.D. ATTENDING RADIOLOGIST  This document has been electronically signed. Sep 18 2019  1:59PM        HEALTH ISSUES - PROBLEM Dx:  Need for prophylactic measure: Need for prophylactic measure  Hypertension: Hypertension  Anemia: Anemia  KAN (acute kidney injury): KAN (acute kidney injury)  Leukocytosis: Leukocytosis  Uveitis, anterior: Uveitis, anterior  Hemoptysis: Hemoptysis ROSE MARYSHERLYN  49y  Female    Patient is a 49y old  Female who presents with a chief complaint of Hemoptysis (17 Sep 2019 22:27)      INTERVAL HPI/OVERNIGHT EVENTS: No acute events overnight. Reports sputum with dark clot this AM, denies any indigo bright red blood since yesterday in her opthalmology appointment.  Patient reports worsening right sided headache, states that it is improved with Tylenol. States that since , she has felt ill, having severe photophobia, night sweats and chills and nausea.     Reports that her menstrual period is regular, reports heavy periods when she was younger, LMP was last month. Denies history of multiple miscarriages. Denies any complications with pregnancy (s/p C/Sx2 and 2 uncomplicated vaginal deliveries).     Patient reports family history of autoimmune disorder including, sarcoidosis in her daughter and maternal history of RA. She believes that her daughter was diagnosed with a biopsy.     REVIEW OF SYSTEMS:  Otherwise negative.     T(C): 37.4 (19 @ 05:03), Max: 38 (19 @ 17:49)  HR: 87 (19 @ 05:53) (82 - 107)  BP: 134/91 (19 @ 05:53) (124/83 - 149/87)  RR: 20 (19 @ 05:03) (16 - 20)  SpO2: 100% (19 @ 05:03) (97% - 100%)  Wt(kg): --Vital Signs Last 24 Hrs  T(C): 37.4 (18 Sep 2019 05:03), Max: 38 (17 Sep 2019 17:49)  T(F): 99.4 (18 Sep 2019 05:03), Max: 100.4 (17 Sep 2019 17:49)  HR: 87 (18 Sep 2019 05:53) (82 - 107)  BP: 134/91 (18 Sep 2019 05:53) (124/83 - 149/87)  BP(mean): --  RR: 20 (18 Sep 2019 05:03) (16 - 20)  SpO2: 100% (18 Sep 2019 05:03) (97% - 100%)    PHYSICAL EXAM:  GENERAL: NAD, well-groomed, well-developed  HEAD:  Atraumatic, Normocephalic  EYES: EOMI, conjunctivitis    NECK: Supple, no nucal rigidity   NERVOUS SYSTEM:  Alert & Oriented X3, Good concentration; Motor Strength 5/5 B/L upper and lower extremities  CHEST/LUNG: Breathing comfortably, clear breath sounds bilaterally, No rales, rhonchi, wheezing, or rubs  HEART: Regular rate and rhythm; No murmurs, rubs, or gallops  ABDOMEN: Soft, Nontender, Nondistended; Bowel sounds present  EXTREMITIES:  Tenderness in right medial patella, ROM intact in LE. 2+ Peripheral Pulses, No clubbing, cyanosis, or edema  SKIN: No rashes or lesions    Consultant(s) Notes Reviewed:  [x ] YES  [ ] NO  Care Discussed with Consultants/Other Providers [ x] YES  [ ] NO    LABS:                        9.3    10.66 )-----------( 310      ( 18 Sep 2019 07:22 )             29.7     -    138  |  101  |  12  ----------------------------<  111<H>  4.2   |  21<L>  |  1.65<H>    Ca    8.9      18 Sep 2019 07:22  Phos  3.7       Mg     2.2         TPro  7.4  /  Alb  3.4  /  TBili  0.3  /  DBili  x   /  AST  20  /  ALT  22  /  AlkPhos  122<H>  09-18    PT/INR - ( 17 Sep 2019 15:02 )   PT: 12.4 SEC;   INR: 1.11       PTT - ( 17 Sep 2019 15:02 )  PTT:49.0 SEC  Urinalysis Basic - ( 18 Sep 2019 01:52 )    Color: LT. YELLOW / Appearance: CLEAR / S.006 / pH: 7.0  Gluc: NEGATIVE / Ketone: NEGATIVE  / Bili: NEGATIVE / Urobili: NORMAL   Blood: NEGATIVE / Protein: NEGATIVE / Nitrite: NEGATIVE   Leuk Esterase: SMALL / RBC: 0-2 / WBC 6-10   Sq Epi: OCC / Non Sq Epi: x / Bacteria: NEGATIVE      CAPILLARY BLOOD GLUCOSE    Urinalysis Basic - ( 18 Sep 2019 01:52 )    Color: LT. YELLOW / Appearance: CLEAR / S.006 / pH: 7.0  Gluc: NEGATIVE / Ketone: NEGATIVE  / Bili: NEGATIVE / Urobili: NORMAL   Blood: NEGATIVE / Protein: NEGATIVE / Nitrite: NEGATIVE   Leuk Esterase: SMALL / RBC: 0-2 / WBC 6-10   Sq Epi: OCC / Non Sq Epi: x / Bacteria: NEGATIVE    RADIOLOGY & ADDITIONAL TESTS:    Imaging Personally Reviewed:  [x ] YES  [ ] NO      EXAM:  CT CHEST        PROCEDURE DATE:  Sep 18 2019       INTERPRETATION:  CLINICAL INDICATION: Hemoptysis.    Axial CT images of the chest are obtained without intravenous   administration of contrast.    No prior chest CTs are available for comparison.    Small or benign-appearing bilateral axillary lymph nodes. Nonspecific   mediastinal lymph nodes with the largest in the right paratracheal   location measuring about 9 mm in short axis. Bilateral hilar lymph nodes   with the largest measuring about 1 cm within the right hilum, likely   reactive.    No pericardial effusion. No pleural effusions.    Evaluation of the upper abdominal organs demonstrate a 9 mm right renal   hypodensity likely a cyst. 2 X 1.1 cm nodule within the inferior aspect   of the left breast.    Evaluation of the lungs demonstrate patchy right upper lobe mixed solid   and groundglass opacity. 3 mm left upper lobe peripheral pulmonary nodule   on image 26 of series 2. Two adjacent tiny nodules within the left lower   lobe on image 83 of series 2 measuring up to 4 mm. No central   endobronchial lesions.    Mild upper thoracic spine scoliosis.    IMPRESSION: Right upper lobe mixed solid and groundglass patchy   groundglass opacity likely pneumonia. A 1 to 3 month follow-up chest CT   is recommended to ensure resolution and exclude primary lung neoplasm.    Mediastinal and hilar lymph nodes and 3 tiny left lung pulmonary nodules   can be monitored on the follow-up CT.    2 x 1.1 cm left breast nodule. Correlation with mammogram and/or   ultrasound is recommended for complete evaluation.    RISHI BENNETT M.D. ATTENDING RADIOLOGIST  This document has been electronically signed. Sep 18 2019  1:59PM        HEALTH ISSUES - PROBLEM Dx:  Need for prophylactic measure: Need for prophylactic measure  Hypertension: Hypertension  Anemia: Anemia  KAN (acute kidney injury): KAN (acute kidney injury)  Leukocytosis: Leukocytosis  Uveitis, anterior: Uveitis, anterior  Hemoptysis: Hemoptysis SHERLYN BAZZI  49y  Female    Patient is a 49y old  Female who presents with a chief complaint of Hemoptysis (17 Sep 2019 22:27)    INTERVAL HPI/OVERNIGHT EVENTS: No acute events overnight. Reports sputum with dark clot this AM, denies any indigo bright red blood since yesterday in her opthalmology appointment. Patient reports worsening right sided headache, states that it is improved with Tylenol. Previously, her States that since , she has felt ill, having severe photophobia, night sweats and chills and nausea. She presented twice to the ED on  and  and was initially diagnosis with viral conjunctivitis. She presented to ophthalmologist with concern of autoimmune etiology of uveitis. Denies any recent travel, but states that she has exposures as [place] the [facility]. Denies any diarrhea, constipation, polyuria or burning with urination.     Reports that her menstrual period is regular, reports heavy periods when she was younger, LMP was last month. She states that she has not had symptoms of menopause Denies history of multiple miscarriages. Denies any complications with pregnancy (s/p C/Sx2 and 2 uncomplicated vaginal deliveries).     Patient reports family history of autoimmune disorder including, sarcoidosis in her daughter and maternal history of RA. She believes that her daughter was diagnosed with a biopsy.     REVIEW OF SYSTEMS:  Otherwise negative.     T(C): 37.4 (19 @ 05:03), Max: 38 (19 @ 17:49)  HR: 87 (19 @ 05:53) (82 - 107)  BP: 134/91 (19 @ 05:53) (124/83 - 149/87)  RR: 20 (19 @ 05:03) (16 - 20)  SpO2: 100% (19 @ 05:03) (97% - 100%)  Wt(kg): --Vital Signs Last 24 Hrs  T(C): 37.4 (18 Sep 2019 05:03), Max: 38 (17 Sep 2019 17:49)  T(F): 99.4 (18 Sep 2019 05:03), Max: 100.4 (17 Sep 2019 17:49)  HR: 87 (18 Sep 2019 05:53) (82 - 107)  BP: 134/91 (18 Sep 2019 05:53) (124/83 - 149/87)  BP(mean): --  RR: 20 (18 Sep 2019 05:03) (16 - 20)  SpO2: 100% (18 Sep 2019 05:03) (97% - 100%)    PHYSICAL EXAM:  GENERAL: NAD, well-groomed, well-developed  HEAD:  Atraumatic, Normocephalic  EYES: EOMI, conjunctivitis    NECK: Supple, no nucal rigidity   NERVOUS SYSTEM:  Alert & Oriented X3, Good concentration; Motor Strength 5/5 B/L upper and lower extremities  CHEST/LUNG: Breathing comfortably, clear breath sounds bilaterally, No rales, rhonchi, wheezing, or rubs  HEART: Regular rate and rhythm; No murmurs, rubs, or gallops  ABDOMEN: Soft, Nontender, Nondistended; Bowel sounds present  EXTREMITIES:  Tenderness in right medial patella, ROM intact in LE. 2+ Peripheral Pulses, No clubbing, cyanosis, or edema  SKIN: No rashes or lesions    Consultant(s) Notes Reviewed:  [x ] YES  [ ] NO  Care Discussed with Consultants/Other Providers [ x] YES  [ ] NO    LABS:                        9.3    10.66 )-----------( 310      ( 18 Sep 2019 07:22 )             29.7     -18    138  |  101  |  12  ----------------------------<  111<H>  4.2   |  21<L>  |  1.65<H>    Ca    8.9      18 Sep 2019 07:22  Phos  3.7       Mg     2.2         TPro  7.4  /  Alb  3.4  /  TBili  0.3  /  DBili  x   /  AST  20  /  ALT  22  /  AlkPhos  122<H>  09-18    PT/INR - ( 17 Sep 2019 15:02 )   PT: 12.4 SEC;   INR: 1.11       PTT - ( 17 Sep 2019 15:02 )  PTT:49.0 SEC  Urinalysis Basic - ( 18 Sep 2019 01:52 )    Color: LT. YELLOW / Appearance: CLEAR / S.006 / pH: 7.0  Gluc: NEGATIVE / Ketone: NEGATIVE  / Bili: NEGATIVE / Urobili: NORMAL   Blood: NEGATIVE / Protein: NEGATIVE / Nitrite: NEGATIVE   Leuk Esterase: SMALL / RBC: 0-2 / WBC 6-10   Sq Epi: OCC / Non Sq Epi: x / Bacteria: NEGATIVE      CAPILLARY BLOOD GLUCOSE    Urinalysis Basic - ( 18 Sep 2019 01:52 )    Color: LT. YELLOW / Appearance: CLEAR / S.006 / pH: 7.0  Gluc: NEGATIVE / Ketone: NEGATIVE  / Bili: NEGATIVE / Urobili: NORMAL   Blood: NEGATIVE / Protein: NEGATIVE / Nitrite: NEGATIVE   Leuk Esterase: SMALL / RBC: 0-2 / WBC 6-10   Sq Epi: OCC / Non Sq Epi: x / Bacteria: NEGATIVE    RADIOLOGY & ADDITIONAL TESTS:    Imaging Personally Reviewed:  [x ] YES  [ ] NO      EXAM:  CT CHEST        PROCEDURE DATE:  Sep 18 2019       INTERPRETATION:  CLINICAL INDICATION: Hemoptysis.    Axial CT images of the chest are obtained without intravenous   administration of contrast.    No prior chest CTs are available for comparison.    Small or benign-appearing bilateral axillary lymph nodes. Nonspecific   mediastinal lymph nodes with the largest in the right paratracheal   location measuring about 9 mm in short axis. Bilateral hilar lymph nodes   with the largest measuring about 1 cm within the right hilum, likely   reactive.    No pericardial effusion. No pleural effusions.    Evaluation of the upper abdominal organs demonstrate a 9 mm right renal   hypodensity likely a cyst. 2 X 1.1 cm nodule within the inferior aspect   of the left breast.    Evaluation of the lungs demonstrate patchy right upper lobe mixed solid   and groundglass opacity. 3 mm left upper lobe peripheral pulmonary nodule   on image 26 of series 2. Two adjacent tiny nodules within the left lower   lobe on image 83 of series 2 measuring up to 4 mm. No central   endobronchial lesions.    Mild upper thoracic spine scoliosis.    IMPRESSION: Right upper lobe mixed solid and groundglass patchy   groundglass opacity likely pneumonia. A 1 to 3 month follow-up chest CT   is recommended to ensure resolution and exclude primary lung neoplasm.    Mediastinal and hilar lymph nodes and 3 tiny left lung pulmonary nodules   can be monitored on the follow-up CT.    2 x 1.1 cm left breast nodule. Correlation with mammogram and/or   ultrasound is recommended for complete evaluation.    RISHI BENNETT M.D. ATTENDING RADIOLOGIST  This document has been electronically signed. Sep 18 2019  1:59PM        HEALTH ISSUES - PROBLEM Dx:  Need for prophylactic measure: Need for prophylactic measure  Hypertension: Hypertension  Anemia: Anemia  KAN (acute kidney injury): KAN (acute kidney injury)  Leukocytosis: Leukocytosis  Uveitis, anterior: Uveitis, anterior  Hemoptysis: Hemoptysis ROSE MARY SHERLYN  49y  Female    Patient is a 49y old  Female who presents with a chief complaint of Hemoptysis (17 Sep 2019 22:27)    INTERVAL HPI/OVERNIGHT EVENTS: No acute events overnight. Reports sputum with dark clot this AM, denies any indigo bright red blood since yesterday in her opthalmology appointment. Patient reports worsening right sided headache, states that it is improved with Tylenol. Previously, her States that since , she has felt ill, having severe photophobia, night sweats and chills and nausea. Denies any joint swelling, but reports increased discomfort in right knee since she has been feeling ill. She presented twice to the ED on  and  and was initially diagnosis with viral conjunctivitis. She presented to ophthalmologist with concern of autoimmune etiology of uveitis. Denies any recent travel, but states that she has exposures as she [place] for the [facility]. Denies any diarrhea, constipation, polyuria or burning with urination. Prior to having reported symptoms, she made an appointment with her PCP for productive cough for 3 days in August that was worse at night, which resolved after a few days. She reports that she has been told in the past by her PCP that she had an elevated autoimmune marker, but was never diagnosed with an autoimmune disorder.     Reports that her menstrual period is regular, reports heavy periods when she was younger, LMP was last month. She states that she has not had symptoms of menopause. Denies history of multiple miscarriages. Denies any complications with pregnancy (s/p C/Sx2 and 2 uncomplicated vaginal deliveries).     Patient reports family history of autoimmune disorder including, sarcoidosis in her daughter and maternal history of RA. She believes that her daughter was diagnosed with a biopsy.     REVIEW OF SYSTEMS:  Otherwise negative.     T(C): 37.4 (19 @ 05:03), Max: 38 (19 @ 17:49)  HR: 87 (19 @ 05:53) (82 - 107)  BP: 134/91 (19 @ 05:53) (124/83 - 149/87)  RR: 20 (19 @ 05:03) (16 - 20)  SpO2: 100% (19 @ 05:03) (97% - 100%)  Wt(kg): --Vital Signs Last 24 Hrs  T(C): 37.4 (18 Sep 2019 05:03), Max: 38 (17 Sep 2019 17:49)  T(F): 99.4 (18 Sep 2019 05:03), Max: 100.4 (17 Sep 2019 17:49)  HR: 87 (18 Sep 2019 05:53) (82 - 107)  BP: 134/91 (18 Sep 2019 05:53) (124/83 - 149/87)  BP(mean): --  RR: 20 (18 Sep 2019 05:03) (16 - 20)  SpO2: 100% (18 Sep 2019 05:03) (97% - 100%)    PHYSICAL EXAM:  GENERAL: NAD, well-groomed, well-developed  HEAD:  Atraumatic, Normocephalic  EYES: EOMI, conjunctivitis    NECK: Supple, no nucal rigidity   NERVOUS SYSTEM:  Alert & Oriented X3, Good concentration; Motor Strength 5/5 B/L upper and lower extremities  CHEST/LUNG: Breathing comfortably, clear breath sounds bilaterally, No rales, rhonchi, wheezing, or rubs  HEART: Regular rate and rhythm; No murmurs, rubs, or gallops  ABDOMEN: Soft, Nontender, Nondistended; Bowel sounds present  EXTREMITIES:  Tenderness in right medial patella, ROM intact in LE. 2+ Peripheral Pulses, No clubbing, cyanosis, or edema  SKIN: No rashes or lesions    Consultant(s) Notes Reviewed:  [x ] YES  [ ] NO  Care Discussed with Consultants/Other Providers [ x] YES  [ ] NO    LABS:                        9.3    10.66 )-----------( 310      ( 18 Sep 2019 07:22 )             29.7         138  |  101  |  12  ----------------------------<  111<H>  4.2   |  21<L>  |  1.65<H>    Ca    8.9      18 Sep 2019 07:22  Phos  3.7       Mg     2.2         TPro  7.4  /  Alb  3.4  /  TBili  0.3  /  DBili  x   /  AST  20  /  ALT  22  /  AlkPhos  122<H>  -18    PT/INR - ( 17 Sep 2019 15:02 )   PT: 12.4 SEC;   INR: 1.11       PTT - ( 17 Sep 2019 15:02 )  PTT:49.0 SEC  Urinalysis Basic - ( 18 Sep 2019 01:52 )    Color: LT. YELLOW / Appearance: CLEAR / S.006 / pH: 7.0  Gluc: NEGATIVE / Ketone: NEGATIVE  / Bili: NEGATIVE / Urobili: NORMAL   Blood: NEGATIVE / Protein: NEGATIVE / Nitrite: NEGATIVE   Leuk Esterase: SMALL / RBC: 0-2 / WBC 6-10   Sq Epi: OCC / Non Sq Epi: x / Bacteria: NEGATIVE      CAPILLARY BLOOD GLUCOSE    Urinalysis Basic - ( 18 Sep 2019 01:52 )    Color: LT. YELLOW / Appearance: CLEAR / S.006 / pH: 7.0  Gluc: NEGATIVE / Ketone: NEGATIVE  / Bili: NEGATIVE / Urobili: NORMAL   Blood: NEGATIVE / Protein: NEGATIVE / Nitrite: NEGATIVE   Leuk Esterase: SMALL / RBC: 0-2 / WBC 6-10   Sq Epi: OCC / Non Sq Epi: x / Bacteria: NEGATIVE    RADIOLOGY & ADDITIONAL TESTS:    Imaging Personally Reviewed:  [x ] YES  [ ] NO      EXAM:  CT CHEST        PROCEDURE DATE:  Sep 18 2019       INTERPRETATION:  CLINICAL INDICATION: Hemoptysis.    Axial CT images of the chest are obtained without intravenous   administration of contrast.    No prior chest CTs are available for comparison.    Small or benign-appearing bilateral axillary lymph nodes. Nonspecific   mediastinal lymph nodes with the largest in the right paratracheal   location measuring about 9 mm in short axis. Bilateral hilar lymph nodes   with the largest measuring about 1 cm within the right hilum, likely   reactive.    No pericardial effusion. No pleural effusions.    Evaluation of the upper abdominal organs demonstrate a 9 mm right renal   hypodensity likely a cyst. 2 X 1.1 cm nodule within the inferior aspect   of the left breast.    Evaluation of the lungs demonstrate patchy right upper lobe mixed solid   and groundglass opacity. 3 mm left upper lobe peripheral pulmonary nodule   on image 26 of series 2. Two adjacent tiny nodules within the left lower   lobe on image 83 of series 2 measuring up to 4 mm. No central   endobronchial lesions.    Mild upper thoracic spine scoliosis.    IMPRESSION: Right upper lobe mixed solid and groundglass patchy   groundglass opacity likely pneumonia. A 1 to 3 month follow-up chest CT   is recommended to ensure resolution and exclude primary lung neoplasm.    Mediastinal and hilar lymph nodes and 3 tiny left lung pulmonary nodules   can be monitored on the follow-up CT.    2 x 1.1 cm left breast nodule. Correlation with mammogram and/or   ultrasound is recommended for complete evaluation.    RISHI BENNETT M.D. ATTENDING RADIOLOGIST  This document has been electronically signed. Sep 18 2019  1:59PM        HEALTH ISSUES - PROBLEM Dx:  Need for prophylactic measure: Need for prophylactic measure  Hypertension: Hypertension  Anemia: Anemia  KAN (acute kidney injury): KAN (acute kidney injury)  Leukocytosis: Leukocytosis  Uveitis, anterior: Uveitis, anterior  Hemoptysis: Hemoptysis

## 2019-09-18 NOTE — PROGRESS NOTE ADULT - PROBLEM SELECTOR PLAN 7
- DVT ppx: Hold pharmacologic ppx given recent hemoptysis  - Diet: NPO after MN for possible procedure for hemoptysis workup Incidental left breast 2 x 1.1 cm nodule found on CT chest, r/o malignancy   - Recommend outpatient follow up for mammography and ultrasound

## 2019-09-19 LAB
24R-OH-CALCIDIOL SERPL-MCNC: 18.4 NG/ML — LOW (ref 30–80)
ALBUMIN SERPL ELPH-MCNC: 3.5 G/DL — SIGNIFICANT CHANGE UP (ref 3.3–5)
ALP SERPL-CCNC: 121 U/L — HIGH (ref 40–120)
ALT FLD-CCNC: 19 U/L — SIGNIFICANT CHANGE UP (ref 4–33)
ANION GAP SERPL CALC-SCNC: 13 MMO/L — SIGNIFICANT CHANGE UP (ref 7–14)
AST SERPL-CCNC: 16 U/L — SIGNIFICANT CHANGE UP (ref 4–32)
BASOPHILS # BLD AUTO: 0.06 K/UL — SIGNIFICANT CHANGE UP (ref 0–0.2)
BASOPHILS NFR BLD AUTO: 0.6 % — SIGNIFICANT CHANGE UP (ref 0–2)
BILIRUB SERPL-MCNC: 0.3 MG/DL — SIGNIFICANT CHANGE UP (ref 0.2–1.2)
BUN SERPL-MCNC: 9 MG/DL — SIGNIFICANT CHANGE UP (ref 7–23)
C-ANCA SER-ACNC: NEGATIVE — SIGNIFICANT CHANGE UP
CALCIUM SERPL-MCNC: 8.9 MG/DL — SIGNIFICANT CHANGE UP (ref 8.4–10.5)
CHLORIDE SERPL-SCNC: 102 MMOL/L — SIGNIFICANT CHANGE UP (ref 98–107)
CHLORIDE UR-SCNC: 59 MMOL/L — SIGNIFICANT CHANGE UP
CO2 SERPL-SCNC: 23 MMOL/L — SIGNIFICANT CHANGE UP (ref 22–31)
CREAT ?TM UR-MCNC: 197.1 MG/DL — SIGNIFICANT CHANGE UP
CREAT SERPL-MCNC: 1.91 MG/DL — HIGH (ref 0.5–1.3)
CULTURE - ACID FAST SMEAR CONCENTRATED: SIGNIFICANT CHANGE UP
DSDNA AB FLD-ACNC: <0.2 — SIGNIFICANT CHANGE UP
ENA SCL70 AB SER-ACNC: 1.2 — HIGH
ENA SS-A AB FLD IA-ACNC: <0.2 — SIGNIFICANT CHANGE UP
EOSINOPHIL # BLD AUTO: 0.14 K/UL — SIGNIFICANT CHANGE UP (ref 0–0.5)
EOSINOPHIL NFR BLD AUTO: 1.4 % — SIGNIFICANT CHANGE UP (ref 0–6)
FERRITIN SERPL-MCNC: 80.02 NG/ML — SIGNIFICANT CHANGE UP (ref 15–150)
FOLATE SERPL-MCNC: 18.2 NG/ML — SIGNIFICANT CHANGE UP (ref 4.7–20)
GLUCOSE SERPL-MCNC: 83 MG/DL — SIGNIFICANT CHANGE UP (ref 70–99)
HCT VFR BLD CALC: 31.5 % — LOW (ref 34.5–45)
HGB BLD-MCNC: 9.5 G/DL — LOW (ref 11.5–15.5)
IMM GRANULOCYTES NFR BLD AUTO: 0.4 % — SIGNIFICANT CHANGE UP (ref 0–1.5)
IRON SATN MFR SERPL: 202 UG/DL — SIGNIFICANT CHANGE UP (ref 140–530)
IRON SATN MFR SERPL: 33 UG/DL — SIGNIFICANT CHANGE UP (ref 30–160)
LYMPHOCYTES # BLD AUTO: 1.51 K/UL — SIGNIFICANT CHANGE UP (ref 1–3.3)
LYMPHOCYTES # BLD AUTO: 15.5 % — SIGNIFICANT CHANGE UP (ref 13–44)
MAGNESIUM SERPL-MCNC: 2.1 MG/DL — SIGNIFICANT CHANGE UP (ref 1.6–2.6)
MCHC RBC-ENTMCNC: 26.3 PG — LOW (ref 27–34)
MCHC RBC-ENTMCNC: 30.2 % — LOW (ref 32–36)
MCV RBC AUTO: 87.3 FL — SIGNIFICANT CHANGE UP (ref 80–100)
MONOCYTES # BLD AUTO: 0.88 K/UL — SIGNIFICANT CHANGE UP (ref 0–0.9)
MONOCYTES NFR BLD AUTO: 9 % — SIGNIFICANT CHANGE UP (ref 2–14)
MYELOPEROXIDASE AB SER-ACNC: 5 UNITS — SIGNIFICANT CHANGE UP
NEUTROPHILS # BLD AUTO: 7.1 K/UL — SIGNIFICANT CHANGE UP (ref 1.8–7.4)
NEUTROPHILS NFR BLD AUTO: 73.1 % — SIGNIFICANT CHANGE UP (ref 43–77)
NRBC # FLD: 0 K/UL — SIGNIFICANT CHANGE UP (ref 0–0)
P-ANCA SER-ACNC: NEGATIVE — SIGNIFICANT CHANGE UP
PHOSPHATE SERPL-MCNC: 4.3 MG/DL — SIGNIFICANT CHANGE UP (ref 2.5–4.5)
PLATELET # BLD AUTO: 306 K/UL — SIGNIFICANT CHANGE UP (ref 150–400)
PMV BLD: 11 FL — SIGNIFICANT CHANGE UP (ref 7–13)
POTASSIUM SERPL-MCNC: 3.9 MMOL/L — SIGNIFICANT CHANGE UP (ref 3.5–5.3)
POTASSIUM SERPL-SCNC: 3.9 MMOL/L — SIGNIFICANT CHANGE UP (ref 3.5–5.3)
POTASSIUM UR-SCNC: 33.3 MMOL/L — SIGNIFICANT CHANGE UP
PROT SERPL-MCNC: 7.6 G/DL — SIGNIFICANT CHANGE UP (ref 6–8.3)
PROTEINASE3 AB FLD-ACNC: <5 UNITS — SIGNIFICANT CHANGE UP
RBC # BLD: 3.61 M/UL — LOW (ref 3.8–5.2)
RBC # FLD: 14.3 % — SIGNIFICANT CHANGE UP (ref 10.3–14.5)
SODIUM SERPL-SCNC: 138 MMOL/L — SIGNIFICANT CHANGE UP (ref 135–145)
SODIUM UR-SCNC: 66 MMOL/L — SIGNIFICANT CHANGE UP
SPECIMEN SOURCE: SIGNIFICANT CHANGE UP
UIBC SERPL-MCNC: 169.2 UG/DL — SIGNIFICANT CHANGE UP (ref 110–370)
VIT B12 SERPL-MCNC: 898 PG/ML — SIGNIFICANT CHANGE UP (ref 200–900)
VIT D25+D1,25 OH+D1,25 PNL SERPL-MCNC: 59.8 PG/ML — SIGNIFICANT CHANGE UP (ref 19.9–79.3)
WBC # BLD: 9.73 K/UL — SIGNIFICANT CHANGE UP (ref 3.8–10.5)
WBC # FLD AUTO: 9.73 K/UL — SIGNIFICANT CHANGE UP (ref 3.8–10.5)

## 2019-09-19 PROCEDURE — 99253 IP/OBS CNSLTJ NEW/EST LOW 45: CPT

## 2019-09-19 PROCEDURE — 99233 SBSQ HOSP IP/OBS HIGH 50: CPT | Mod: GC

## 2019-09-19 RX ADMIN — Medication 1 DROP(S): at 17:50

## 2019-09-19 RX ADMIN — ONDANSETRON 4 MILLIGRAM(S): 8 TABLET, FILM COATED ORAL at 07:03

## 2019-09-19 RX ADMIN — CEFTRIAXONE 100 MILLIGRAM(S): 500 INJECTION, POWDER, FOR SOLUTION INTRAMUSCULAR; INTRAVENOUS at 13:24

## 2019-09-19 RX ADMIN — AZITHROMYCIN 250 MILLIGRAM(S): 500 TABLET, FILM COATED ORAL at 17:51

## 2019-09-19 RX ADMIN — Medication 1 MILLIGRAM(S): at 11:19

## 2019-09-19 RX ADMIN — Medication 650 MILLIGRAM(S): at 21:30

## 2019-09-19 RX ADMIN — Medication 1 DROP(S): at 23:15

## 2019-09-19 RX ADMIN — OXYCODONE HYDROCHLORIDE 5 MILLIGRAM(S): 5 TABLET ORAL at 23:16

## 2019-09-19 RX ADMIN — Medication 1 DROP(S): at 06:48

## 2019-09-19 RX ADMIN — Medication 100 MILLIGRAM(S): at 06:47

## 2019-09-19 RX ADMIN — Medication 650 MILLIGRAM(S): at 03:15

## 2019-09-19 RX ADMIN — Medication 650 MILLIGRAM(S): at 02:45

## 2019-09-19 RX ADMIN — PANTOPRAZOLE SODIUM 40 MILLIGRAM(S): 20 TABLET, DELAYED RELEASE ORAL at 06:47

## 2019-09-19 RX ADMIN — Medication 650 MILLIGRAM(S): at 09:34

## 2019-09-19 RX ADMIN — Medication 650 MILLIGRAM(S): at 14:58

## 2019-09-19 RX ADMIN — Medication 650 MILLIGRAM(S): at 08:34

## 2019-09-19 RX ADMIN — Medication 650 MILLIGRAM(S): at 15:43

## 2019-09-19 RX ADMIN — Medication 650 MILLIGRAM(S): at 20:34

## 2019-09-19 RX ADMIN — Medication 1 DROP(S): at 00:25

## 2019-09-19 RX ADMIN — Medication 100 MILLIGRAM(S): at 17:50

## 2019-09-19 RX ADMIN — Medication 1 DROP(S): at 11:19

## 2019-09-19 NOTE — PROGRESS NOTE ADULT - ATTENDING COMMENTS
Patient seen and examined by myself , case discussed  with resident ,agree with the above finding and plan  pt clinically feeling better, afebrile , reports mild headache denies , dizziness, SOB, CP, Palpitations , N/V/D, abdominal pain   pt had slight blood tinged sputum today ,will f/u Sputum for AFB, Pulmonary eval requested for possible bronch   will f/u recommendations , c/w IV Abx for Pneumonia, f/u cx   Rheumatology eval appreciated, will f/u w/u for Autoimmune condition   Worsening renal fn: will check Renal US, urine Lytes, bladder scan, monitor I&O    Will request Optho eval for Uveitis   DVt PPx

## 2019-09-19 NOTE — PROGRESS NOTE ADULT - PROBLEM SELECTOR PLAN 4
Cr on admission 1.8 vs. 1.11 on 9/12/19, downtrending currently 1.6. BUN 14. Likely pre-renal due to dehydration, will r/o possible autoimmune disorder/vasculitis.   - Will continue to monitor CMP   - Will continue IV fluids   - Given KAN with anemia and protein gap, will check UPEP, SPEP, serum immunofixation, serum FLC, and quant immunoglobulins for possible multiple myeloma  - Monitor Cr and UOP  - Avoid NSAIDs, ACEi/ARBs, contrast, nephrotoxic agents. Will hold pt's home losartan.  - Renally dose meds Cr on admission 1.8 vs. 1.11 on 9/12/19, downtrended and now increasing, 1.9. Concern for intra-renal, post-renal KAN, will r/o possible autoimmune disorder/vasculitis.   - Will continue to monitor CMP   - Given KAN with anemia and protein gap, will check UPEP, SPEP, serum immunofixation, serum FLC, and quant immunoglobulins for possible multiple myeloma   - Monitor Cr and UOP  - Avoid NSAIDs, ACEi/ARBs, contrast, nephrotoxic agents. Will hold pt's home losartan.  - f/u Renal U/S  - Bladder scan to r/o urinary retention   - Urine protein 14.7   - Renally dose meds

## 2019-09-19 NOTE — PROGRESS NOTE ADULT - SUBJECTIVE AND OBJECTIVE BOX
SHERLYN BAZZI  49y  Female    Patient is a 49y old  Female who presents with a chief complaint of Hemoptysis (17 Sep 2019 22:27)    Patient reports family history of autoimmune disorder including, sarcoidosis in her daughter and maternal history of RA. She believes that her daughter was diagnosed with a biopsy.     REVIEW OF SYSTEMS:  Otherwise negative.     INTERVAL HPI/OVERNIGHT EVENTS: No acute events overnight.   Vital Signs Last 24 Hrs  T(C): 36.8 (19 Sep 2019 06:40), Max: 37.3 (18 Sep 2019 20:52)  T(F): 98.2 (19 Sep 2019 06:40), Max: 99.1 (18 Sep 2019 20:52)  HR: 84 (19 Sep 2019 06:40) (80 - 90)  BP: 130/87 (19 Sep 2019 06:40) (130/87 - 146/92)  BP(mean): --  RR: 18 (19 Sep 2019 06:40) (18 - 18)  SpO2: 98% (19 Sep 2019 06:40) (97% - 100%)    PHYSICAL EXAM:  GENERAL: NAD, well-groomed, well-developed  HEAD:  Atraumatic, Normocephalic  EYES: EOMI, conjunctivitis    NECK: Supple, no nuchal rigidity   NERVOUS SYSTEM:  Alert & Oriented X3, Good concentration; Motor Strength 5/5 B/L upper and lower extremities  CHEST/LUNG: Breathing comfortably, clear breath sounds bilaterally, No rales, rhonchi, wheezing, or rubs  HEART: Regular rate and rhythm; No murmurs, rubs, or gallops  ABDOMEN: Soft, Nontender, Nondistended; Bowel sounds present  EXTREMITIES:  Tenderness in right medial patella, ROM intact in LE. 2+ Peripheral Pulses, No clubbing, cyanosis, or edema  SKIN: No rashes or lesions    Consultant(s) Notes Reviewed:  [x ] YES  [ ] NO  Care Discussed with Consultants/Other Providers [ x] YES  [ ] NO    LABS:                 PTT - ( 17 Sep 2019 15:02 )  PTT:49.0 SEC  Urinalysis Basic - ( 18 Sep 2019 01:52 )    Color: LT. YELLOW / Appearance: CLEAR / S.006 / pH: 7.0  Gluc: NEGATIVE / Ketone: NEGATIVE  / Bili: NEGATIVE / Urobili: NORMAL   Blood: NEGATIVE / Protein: NEGATIVE / Nitrite: NEGATIVE   Leuk Esterase: SMALL / RBC: 0-2 / WBC 6-10   Sq Epi: OCC / Non Sq Epi: x / Bacteria: NEGATIVE      CAPILLARY BLOOD GLUCOSE    Urinalysis Basic - ( 18 Sep 2019 01:52 )    Color: LT. YELLOW / Appearance: CLEAR / S.006 / pH: 7.0  Gluc: NEGATIVE / Ketone: NEGATIVE  / Bili: NEGATIVE / Urobili: NORMAL   Blood: NEGATIVE / Protein: NEGATIVE / Nitrite: NEGATIVE   Leuk Esterase: SMALL / RBC: 0-2 / WBC 6-10   Sq Epi: OCC / Non Sq Epi: x / Bacteria: NEGATIVE    RADIOLOGY & ADDITIONAL TESTS:    Imaging Personally Reviewed:  [x ] YES  [ ] NO      EXAM:  CT CHEST        PROCEDURE DATE:  Sep 18 2019       INTERPRETATION:  CLINICAL INDICATION: Hemoptysis.    Axial CT images of the chest are obtained without intravenous   administration of contrast.    No prior chest CTs are available for comparison.    Small or benign-appearing bilateral axillary lymph nodes. Nonspecific   mediastinal lymph nodes with the largest in the right paratracheal   location measuring about 9 mm in short axis. Bilateral hilar lymph nodes   with the largest measuring about 1 cm within the right hilum, likely   reactive.    No pericardial effusion. No pleural effusions.    Evaluation of the upper abdominal organs demonstrate a 9 mm right renal   hypodensity likely a cyst. 2 X 1.1 cm nodule within the inferior aspect   of the left breast.    Evaluation of the lungs demonstrate patchy right upper lobe mixed solid   and groundglass opacity. 3 mm left upper lobe peripheral pulmonary nodule   on image 26 of series 2. Two adjacent tiny nodules within the left lower   lobe on image 83 of series 2 measuring up to 4 mm. No central   endobronchial lesions.    Mild upper thoracic spine scoliosis.    IMPRESSION: Right upper lobe mixed solid and groundglass patchy   groundglass opacity likely pneumonia. A 1 to 3 month follow-up chest CT   is recommended to ensure resolution and exclude primary lung neoplasm.    Mediastinal and hilar lymph nodes and 3 tiny left lung pulmonary nodules   can be monitored on the follow-up CT.    2 x 1.1 cm left breast nodule. Correlation with mammogram and/or   ultrasound is recommended for complete evaluation.    RISHI BENNETT M.D. ATTENDING RADIOLOGIST  This document has been electronically signed. Sep 18 2019  1:59PM        HEALTH ISSUES - PROBLEM Dx:  Need for prophylactic measure: Need for prophylactic measure  Hypertension: Hypertension  Anemia: Anemia  KAN (acute kidney injury): KAN (acute kidney injury)  Leukocytosis: Leukocytosis  Uveitis, anterior: Uveitis, anterior  Hemoptysis: Hemoptysis SHERLYN BAZZI  49y  Female    Patient is a 49y old  Female who presents with a chief complaint of Hemoptysis (17 Sep 2019 22:27)    INTERVAL HPI/OVERNIGHT EVENTS: No acute events overnight.     REVIEW OF SYSTEMS:  Otherwise negative.     Vital Signs Last 24 Hrs  T(C): 36.8 (19 Sep 2019 06:40), Max: 37.3 (18 Sep 2019 20:52)  T(F): 98.2 (19 Sep 2019 06:40), Max: 99.1 (18 Sep 2019 20:52)  HR: 84 (19 Sep 2019 06:40) (80 - 90)  BP: 130/87 (19 Sep 2019 06:40) (130/87 - 146/92)  BP(mean): --  RR: 18 (19 Sep 2019 06:40) (18 - 18)  SpO2: 98% (19 Sep 2019 06:40) (97% - 100%)    PHYSICAL EXAM:  GENERAL: NAD, well-groomed, well-developed  HEAD:  Atraumatic, Normocephalic  EYES: EOMI, conjunctivitis    NECK: Supple, no nuchal rigidity   NERVOUS SYSTEM:  Alert & Oriented X3, Good concentration; Motor Strength 5/5 B/L upper and lower extremities  CHEST/LUNG: Breathing comfortably, clear breath sounds bilaterally, No rales, rhonchi, wheezing, or rubs  HEART: Regular rate and rhythm; No murmurs, rubs, or gallops  ABDOMEN: Soft, Nontender, Nondistended; Bowel sounds present  EXTREMITIES:  Tenderness in right medial patella, ROM intact in LE. 2+ Peripheral Pulses, No clubbing, cyanosis, or edema  SKIN: No rashes or lesions    Consultant(s) Notes Reviewed:  [x ] YES  [ ] NO  Care Discussed with Consultants/Other Providers [ x] YES  [ ] NO    LABS:                                   9.5    9.73  )-----------( 306      ( 19 Sep 2019 07:15 )             31.5     -    138  |  102  |  9   ----------------------------<  83  3.9   |  23  |  1.91<H>    Ca    8.9      19 Sep 2019 07:15  Phos  4.3     -  Mg     2.1     -    TPro  7.6  /  Alb  3.5  /  TBili  0.3  /  DBili  x   /  AST  16  /  ALT  19  /  AlkPhos  121<H>  09-      PTT - ( 17 Sep 2019 15:02 )  PTT:49.0 SEC  Urinalysis Basic - ( 18 Sep 2019 01:52 )    Color: LT. YELLOW / Appearance: CLEAR / S.006 / pH: 7.0  Gluc: NEGATIVE / Ketone: NEGATIVE  / Bili: NEGATIVE / Urobili: NORMAL   Blood: NEGATIVE / Protein: NEGATIVE / Nitrite: NEGATIVE   Leuk Esterase: SMALL / RBC: 0-2 / WBC 6-10   Sq Epi: OCC / Non Sq Epi: x / Bacteria: NEGATIVE      CAPILLARY BLOOD GLUCOSE    Urinalysis Basic - ( 18 Sep 2019 01:52 )    Color: LT. YELLOW / Appearance: CLEAR / S.006 / pH: 7.0  Gluc: NEGATIVE / Ketone: NEGATIVE  / Bili: NEGATIVE / Urobili: NORMAL   Blood: NEGATIVE / Protein: NEGATIVE / Nitrite: NEGATIVE   Leuk Esterase: SMALL / RBC: 0-2 / WBC 6-10   Sq Epi: OCC / Non Sq Epi: x / Bacteria: NEGATIVE    RADIOLOGY & ADDITIONAL TESTS:    Imaging Personally Reviewed:  [x ] YES  [ ] NO      EXAM:  CT CHEST        PROCEDURE DATE:  Sep 18 2019       INTERPRETATION:  CLINICAL INDICATION: Hemoptysis.    Axial CT images of the chest are obtained without intravenous   administration of contrast.    No prior chest CTs are available for comparison.    Small or benign-appearing bilateral axillary lymph nodes. Nonspecific   mediastinal lymph nodes with the largest in the right paratracheal   location measuring about 9 mm in short axis. Bilateral hilar lymph nodes   with the largest measuring about 1 cm within the right hilum, likely   reactive.    No pericardial effusion. No pleural effusions.    Evaluation of the upper abdominal organs demonstrate a 9 mm right renal   hypodensity likely a cyst. 2 X 1.1 cm nodule within the inferior aspect   of the left breast.    Evaluation of the lungs demonstrate patchy right upper lobe mixed solid   and groundglass opacity. 3 mm left upper lobe peripheral pulmonary nodule   on image 26 of series 2. Two adjacent tiny nodules within the left lower   lobe on image 83 of series 2 measuring up to 4 mm. No central   endobronchial lesions.    Mild upper thoracic spine scoliosis.    IMPRESSION: Right upper lobe mixed solid and groundglass patchy   groundglass opacity likely pneumonia. A 1 to 3 month follow-up chest CT   is recommended to ensure resolution and exclude primary lung neoplasm.    Mediastinal and hilar lymph nodes and 3 tiny left lung pulmonary nodules   can be monitored on the follow-up CT.    2 x 1.1 cm left breast nodule. Correlation with mammogram and/or   ultrasound is recommended for complete evaluation.    RISHI BENNETT M.D. ATTENDING RADIOLOGIST  This document has been electronically signed. Sep 18 2019  1:59PM        HEALTH ISSUES - PROBLEM Dx:  Need for prophylactic measure: Need for prophylactic measure  Hypertension: Hypertension  Anemia: Anemia  KAN (acute kidney injury): KAN (acute kidney injury)  Leukocytosis: Leukocytosis  Uveitis, anterior: Uveitis, anterior  Hemoptysis: Hemoptysis EMILIA BAZZITANYA  49y  Female    Patient is a 49y old  Female who presents with a chief complaint of Hemoptysis (17 Sep 2019 22:27)    INTERVAL HPI/OVERNIGHT EVENTS: No acute events overnight. Patient reports that she is feeling well today, however continues to reports posterior right eye pain that radiates to her right posterior head. She reports that this morning there was small amount of bright red blood in her spectrum. Reports continuing to have chills and night sweats while sleeping. Denies chest pain, SOB.      REVIEW OF SYSTEMS:  Otherwise negative.     Vital Signs Last 24 Hrs  T(C): 36.8 (19 Sep 2019 06:40), Max: 37.3 (18 Sep 2019 20:52)  T(F): 98.2 (19 Sep 2019 06:40), Max: 99.1 (18 Sep 2019 20:52)  HR: 84 (19 Sep 2019 06:40) (80 - 90)  BP: 130/87 (19 Sep 2019 06:40) (130/87 - 146/92)  BP(mean): --  RR: 18 (19 Sep 2019 06:40) (18 - 18)  SpO2: 98% (19 Sep 2019 06:40) (97% - 100%)    PHYSICAL EXAM:  GENERAL: NAD, well-groomed, well-developed  HEAD:  Atraumatic, Normocephalic  EYES: EOMI, conjunctivitis    NECK: Supple, no nuchal rigidity   NERVOUS SYSTEM:  Alert & Oriented X3, Good concentration; Motor Strength 5/5 B/L upper and lower extremities  CHEST/LUNG: Breathing comfortably, clear breath sounds bilaterally, No rales, rhonchi, wheezing, or rubs  HEART: Regular rate and rhythm; No murmurs, rubs, or gallops  ABDOMEN: Soft, Nontender, Nondistended; Bowel sounds present  EXTREMITIES:  Tenderness in right medial patella, ROM intact in LE. 2+ Peripheral Pulses, No clubbing, cyanosis, or edema  LYMPH: No cervical, supraclavicular or axillar lymphadenopathy noted.   SKIN: No rashes or lesions    Consultant(s) Notes Reviewed:  [x ] YES  [ ] NO  Care Discussed with Consultants/Other Providers [ x] YES  [ ] NO    LABS:                                   9.5    9.73  )-----------( 306      ( 19 Sep 2019 07:15 )             31.5     09-19    138  |  102  |  9   ----------------------------<  83  3.9   |  23  |  1.91<H>    Ca    8.9      19 Sep 2019 07:15  Phos  4.3       Mg     2.1         TPro  7.6  /  Alb  3.5  /  TBili  0.3  /  DBili  x   /  AST  16  /  ALT  19  /  AlkPhos  121<H>        PTT - ( 17 Sep 2019 15:02 )  PTT:49.0 SEC  Urinalysis Basic - ( 18 Sep 2019 01:52 )    Color: LT. YELLOW / Appearance: CLEAR / S.006 / pH: 7.0  Gluc: NEGATIVE / Ketone: NEGATIVE  / Bili: NEGATIVE / Urobili: NORMAL   Blood: NEGATIVE / Protein: NEGATIVE / Nitrite: NEGATIVE   Leuk Esterase: SMALL / RBC: 0-2 / WBC 6-10   Sq Epi: OCC / Non Sq Epi: x / Bacteria: NEGATIVE      CAPILLARY BLOOD GLUCOSE    Urinalysis Basic - ( 18 Sep 2019 01:52 )    Color: LT. YELLOW / Appearance: CLEAR / S.006 / pH: 7.0  Gluc: NEGATIVE / Ketone: NEGATIVE  / Bili: NEGATIVE / Urobili: NORMAL   Blood: NEGATIVE / Protein: NEGATIVE / Nitrite: NEGATIVE   Leuk Esterase: SMALL / RBC: 0-2 / WBC 6-10   Sq Epi: OCC / Non Sq Epi: x / Bacteria: NEGATIVE    RADIOLOGY & ADDITIONAL TESTS:    Imaging Personally Reviewed:  [x ] YES  [ ] NO      EXAM:  CT CHEST        PROCEDURE DATE:  Sep 18 2019       INTERPRETATION:  CLINICAL INDICATION: Hemoptysis.    Axial CT images of the chest are obtained without intravenous   administration of contrast.    No prior chest CTs are available for comparison.    Small or benign-appearing bilateral axillary lymph nodes. Nonspecific   mediastinal lymph nodes with the largest in the right paratracheal   location measuring about 9 mm in short axis. Bilateral hilar lymph nodes   with the largest measuring about 1 cm within the right hilum, likely   reactive.    No pericardial effusion. No pleural effusions.    Evaluation of the upper abdominal organs demonstrate a 9 mm right renal   hypodensity likely a cyst. 2 X 1.1 cm nodule within the inferior aspect   of the left breast.    Evaluation of the lungs demonstrate patchy right upper lobe mixed solid   and groundglass opacity. 3 mm left upper lobe peripheral pulmonary nodule   on image 26 of series 2. Two adjacent tiny nodules within the left lower   lobe on image 83 of series 2 measuring up to 4 mm. No central   endobronchial lesions.    Mild upper thoracic spine scoliosis.    IMPRESSION: Right upper lobe mixed solid and groundglass patchy   groundglass opacity likely pneumonia. A 1 to 3 month follow-up chest CT   is recommended to ensure resolution and exclude primary lung neoplasm.    Mediastinal and hilar lymph nodes and 3 tiny left lung pulmonary nodules   can be monitored on the follow-up CT.    2 x 1.1 cm left breast nodule. Correlation with mammogram and/or   ultrasound is recommended for complete evaluation.    RISHI BENNETT M.D. ATTENDING RADIOLOGIST  This document has been electronically signed. Sep 18 2019  1:59PM        HEALTH ISSUES - PROBLEM Dx:  Need for prophylactic measure: Need for prophylactic measure  Hypertension: Hypertension  Anemia: Anemia  KAN (acute kidney injury): KAN (acute kidney injury)  Leukocytosis: Leukocytosis  Uveitis, anterior: Uveitis, anterior  Hemoptysis: Hemoptysis ANUEL BAZZIYOSEPH  49y  Female    Patient is a 49y old  Female who presents with a chief complaint of Hemoptysis (17 Sep 2019 22:27)    INTERVAL HPI/OVERNIGHT EVENTS: No acute events overnight. Patient reports that she is feeling well today, however continues to reports posterior right eye pain that radiates to her right posterior head. She reports that this morning there was small amount of bright red blood in her spectrum. Reports continuing to have chills and night sweats while sleeping. Reports improvement in her right knee discomfort. Denies chest pain, SOB, joint swelling or pain. Denies any dysuria or polyuria.     REVIEW OF SYSTEMS:  Otherwise negative.     Vital Signs Last 24 Hrs  T(C): 36.8 (19 Sep 2019 06:40), Max: 37.3 (18 Sep 2019 20:52)  T(F): 98.2 (19 Sep 2019 06:40), Max: 99.1 (18 Sep 2019 20:52)  HR: 84 (19 Sep 2019 06:40) (80 - 90)  BP: 130/87 (19 Sep 2019 06:40) (130/87 - 146/92)  BP(mean): --  RR: 18 (19 Sep 2019 06:40) (18 - 18)  SpO2: 98% (19 Sep 2019 06:40) (97% - 100%)    PHYSICAL EXAM:  GENERAL: NAD, well-groomed, well-developed  HEAD:  Atraumatic, Normocephalic  EYES: EOMI, conjunctivitis    NECK: Supple, no nuchal rigidity   NERVOUS SYSTEM:  Alert & Oriented X3, Good concentration; Motor Strength 5/5 B/L upper and lower extremities  CHEST/LUNG: Breathing comfortably, clear breath sounds bilaterally, No rales, rhonchi, wheezing, or rubs  HEART: Regular rate and rhythm; No murmurs, rubs, or gallops  ABDOMEN: Soft, Nontender, Nondistended; Bowel sounds present  EXTREMITIES:  Tenderness in right medial patella, ROM intact in LE. 2+ Peripheral Pulses, No clubbing, cyanosis, or edema  LYMPH: No cervical, supraclavicular or axillar lymphadenopathy noted.   SKIN: No rashes or lesions    Consultant(s) Notes Reviewed:  [x ] YES  [ ] NO  Care Discussed with Consultants/Other Providers [ x] YES  [ ] NO    LABS:                                   9.5    9.73  )-----------( 306      ( 19 Sep 2019 07:15 )             31.5         138  |  102  |  9   ----------------------------<  83  3.9   |  23  |  1.91<H>    Ca    8.9      19 Sep 2019 07:15  Phos  4.3       Mg     2.1         TPro  7.6  /  Alb  3.5  /  TBili  0.3  /  DBili  x   /  AST  16  /  ALT  19  /  AlkPhos  121<H>        PTT - ( 17 Sep 2019 15:02 )  PTT:49.0 SEC  Urinalysis Basic - ( 18 Sep 2019 01:52 )    Color: LT. YELLOW / Appearance: CLEAR / S.006 / pH: 7.0  Gluc: NEGATIVE / Ketone: NEGATIVE  / Bili: NEGATIVE / Urobili: NORMAL   Blood: NEGATIVE / Protein: NEGATIVE / Nitrite: NEGATIVE   Leuk Esterase: SMALL / RBC: 0-2 / WBC 6-10   Sq Epi: OCC / Non Sq Epi: x / Bacteria: NEGATIVE      CAPILLARY BLOOD GLUCOSE    Urinalysis Basic - ( 18 Sep 2019 01:52 )    Color: LT. YELLOW / Appearance: CLEAR / S.006 / pH: 7.0  Gluc: NEGATIVE / Ketone: NEGATIVE  / Bili: NEGATIVE / Urobili: NORMAL   Blood: NEGATIVE / Protein: NEGATIVE / Nitrite: NEGATIVE   Leuk Esterase: SMALL / RBC: 0-2 / WBC 6-10   Sq Epi: OCC / Non Sq Epi: x / Bacteria: NEGATIVE    RADIOLOGY & ADDITIONAL TESTS:    Imaging Personally Reviewed:  [x ] YES  [ ] NO      EXAM:  CT CHEST        PROCEDURE DATE:  Sep 18 2019       INTERPRETATION:  CLINICAL INDICATION: Hemoptysis.    Axial CT images of the chest are obtained without intravenous   administration of contrast.    No prior chest CTs are available for comparison.    Small or benign-appearing bilateral axillary lymph nodes. Nonspecific   mediastinal lymph nodes with the largest in the right paratracheal   location measuring about 9 mm in short axis. Bilateral hilar lymph nodes   with the largest measuring about 1 cm within the right hilum, likely   reactive.    No pericardial effusion. No pleural effusions.    Evaluation of the upper abdominal organs demonstrate a 9 mm right renal   hypodensity likely a cyst. 2 X 1.1 cm nodule within the inferior aspect   of the left breast.    Evaluation of the lungs demonstrate patchy right upper lobe mixed solid   and groundglass opacity. 3 mm left upper lobe peripheral pulmonary nodule   on image 26 of series 2. Two adjacent tiny nodules within the left lower   lobe on image 83 of series 2 measuring up to 4 mm. No central   endobronchial lesions.    Mild upper thoracic spine scoliosis.    IMPRESSION: Right upper lobe mixed solid and groundglass patchy   groundglass opacity likely pneumonia. A 1 to 3 month follow-up chest CT   is recommended to ensure resolution and exclude primary lung neoplasm.    Mediastinal and hilar lymph nodes and 3 tiny left lung pulmonary nodules   can be monitored on the follow-up CT.    2 x 1.1 cm left breast nodule. Correlation with mammogram and/or   ultrasound is recommended for complete evaluation.    RISHI BENNETT M.D. ATTENDING RADIOLOGIST  This document has been electronically signed. Sep 18 2019  1:59PM        HEALTH ISSUES - PROBLEM Dx:  Need for prophylactic measure: Need for prophylactic measure  Hypertension: Hypertension  Anemia: Anemia  KAN (acute kidney injury): KAN (acute kidney injury)  Leukocytosis: Leukocytosis  Uveitis, anterior: Uveitis, anterior  Hemoptysis: Hemoptysis

## 2019-09-19 NOTE — CONSULT NOTE ADULT - SUBJECTIVE AND OBJECTIVE BOX
[facility] Ophthalmology Consult Note    HPI:  Pt is a 48 y/o F who is admitted for management of pneumonia. The pt visited the ED on 9/8 and 9/12 with a complaint of eye pain of the left eye that spread to the right eye. She was dx with possible viral conjunctivitis and sent for outpatient follow up with ophthalmology. The pt was seen on 9/17 by private ophthalmologist Dr. Spring Javed and dx with uveitis of the right eye and prescribed Pred Forte QID to the right. She was unable to get the drops as she had an episode of hemoptysis, which prompted her to pursue further care. Pt states she has some eye pain in the right eye but it has improved since drop initiation. No pain in the left eye, pt denies vision loss, floaters, photophobia.     PMH: HTN   Meds: IV Ceftriaxone and IV Azithromycin Labetolol  POcHx (including surgeries/lasers/trauma):  Uveitis OD   Drops: Pred Forte QID   FamHx: None  Social Hx: None  Allergies: NKDA    ROS:  General (neg), Vision (per HPI), Head and Neck (neg), Pulm (cough) Musculoskeletal (neg), Skin/Integ (neg), Neuro (neg), Endocrine (neg), Heme (neg), All/Immuno (neg)    Mood and Affect Appropriate ( x ),  Oriented to Time, Place, and Person x 3 ( x )    Ophthalmology Exam    Visual acuity (sc): 20/20 OU  Pupils: PERRL OU, no APD  Ttono: 20 OU   Extraocular movements (EOMs): Full OU, no pain, no diplopia  Confrontational Visual Field (CVF):  Full OU  Color Plates: 12/12 OU    Slit Lamp Exam (SLE)  External:  Flat OU  Lids/Lashes/Lacrimal Ducts: Flat OU    Sclera/Conjunctiva:  W+Q OU  Cornea: Cl OU  Anterior Chamber: +2 cell OD, +1 cell OS   Iris:  Flat OD , OS : posterior synechiae at 9 oclock and 2 oclock  Lens:  Cl OU    Fundus Exam: dilated with 1% tropicamide and 2.5% phenylephrine  Approval obtained from primary team for dilation  Patient aware that pupils can remained dilated for at least 4-6 hours  Exam performed with 20D lens    Vitreous: + cell OU   Disc, cup/disc: sharp and pink, 0.4 OU  Macula:  wnl OU  Vessels:  wnl OU  Periphery: wnl OU      Assessment and Plan:  48 y/o F who is admitted for management of pneumonia and recent dx of anterior uveitis OD.   - continue Pred Forte QID OD   - Add cyclogyl TID OD   - pt has sign of previous uveitic reaction of the left eye and should thus be worked for systemic and rheumatologic causes of uveitis   - recommend VDRL, serum ACE, lysozyme, HLA-B27,   - will follow     Follow-Up:  Patient should follow up his/her ophthalmologist or in the [facility] Op[facility]mology Practice within 1 week of discharge  [address] [place] [address] [phone]    S/D/W Dr Nunn (attending) [facility] Ophthalmology Consult Note    HPI:  Pt is a 50 y/o F who is admitted for management of pneumonia. The pt visited the ED on 9/8 and 9/12 with a complaint of eye pain of the left eye that spread to the right eye. She was dx with possible viral conjunctivitis and sent for outpatient follow up with ophthalmology. The pt was seen on 9/17 by private ophthalmologist Dr. Spring Javed and dx with uveitis of the right eye and prescribed Pred Forte QID to the right. She was unable to get the drops as she had an episode of hemoptysis, which prompted her to pursue further care. Pt states she has some eye pain in the right eye but it has improved since drop initiation. No pain in the left eye, pt denies vision loss, floaters, photophobia.     PMH: HTN   Meds: IV Ceftriaxone and IV Azithromycin Labetolol  POcHx (including surgeries/lasers/trauma):  Uveitis OD   Drops: Pred Forte QID   FamHx: None  Social Hx: None  Allergies: NKDA    ROS:  General (neg), Vision (per HPI), Head and Neck (neg), Pulm (cough) Musculoskeletal (neg), Skin/Integ (neg), Neuro (neg), Endocrine (neg), Heme (neg), All/Immuno (neg)    Mood and Affect Appropriate ( x ),  Oriented to Time, Place, and Person x 3 ( x )    Ophthalmology Exam    Visual acuity (sc): 20/20 OU  Pupils: PERRL OU, no APD  Ttono: 20 OU   Extraocular movements (EOMs): Full OU, no pain, no diplopia  Confrontational Visual Field (CVF):  Full OU  Color Plates: 12/12 OU    Slit Lamp Exam (SLE)  External:  Flat OU  Lids/Lashes/Lacrimal Ducts: Flat OU    Sclera/Conjunctiva:  W+Q OU  Cornea: Cl OU  Anterior Chamber: +2 cell OD, +1 cell OS (after dilation, pigmented OS, mixed pigmented and non-pigmented OD)  Iris:  Flat OD , OS : posterior synechiae at 9 oclock and 2 oclock  Lens:  Cl OU    Fundus Exam: dilated with 1% tropicamide and 2.5% phenylephrine  Approval obtained from primary team for dilation  Patient aware that pupils can remained dilated for at least 4-6 hours  Exam performed with 20D lens    Vitreous: trace cell OU   Disc, cup/disc: sharp and pink, 0.4 OU  Macula:  wnl OU  Vessels:  wnl OU  Periphery: wnl OU      Assessment and Plan:  50 y/o F who is admitted for management of pneumonia and recent dx of anterior uveitis OD.   - continue Pred Forte QID OD   - Add cyclogyl TID OD   - pt has sign of previous uveitic reaction of the left eye and should thus be worked for systemic and rheumatologic causes of uveitis   - recommend VDRL, serum ACE, lysozyme, HLA-B27, RPR, FTA, JULI, Lyme, Quantiferon, CXR already performed  - will follow while inpatient  - findings and plan discussed with patient and primary team    Follow-Up:  Patient should follow up his/her ophthalmologist or in the [facility]mology Practice within 1 week of discharge  [address] [facility].  [place] [id number] [phone]    S/D/W Dr Nunn (attending)

## 2019-09-19 NOTE — PROGRESS NOTE ADULT - ASSESSMENT
50 yo  female, domiciled with family, employed by [employer], with history of HTN and migraine headaches (well controlled for past 2 years) presents with new onset of hemoptysis associated with N/V, fever and leukocytosis and recent diagnosis of iritis and anterior uveitis, concerning for pneumonia complicated by potential underlying autoimmune disorder vs malignancy. 48 yo  female, domiciled with family, employed by [employer], with history of HTN and migraine headaches (well controlled for past 2 years) presents with new onset of hemoptysis associated with N/V, fever and leukocytosis and recent diagnosis of iritis and anterior uveitis, concerning for pneumonia complicated by potential underlying autoimmune disorder.

## 2019-09-19 NOTE — PROGRESS NOTE ADULT - PROBLEM SELECTOR PLAN 1
Pt with 4 total episodes of hemoptysis, each time a dime-sized amount. Possibly associated with fevers, chills, night sweats, and anterior uveitis. Differential for the hemoptysis includes TB, bronchitis, PNA, PE, GI etiology (e.g., Amy-Daniels tear), and autoimmune conditions (e.g., sarcoidosis, SLE, GPA, Goodpasture's). ESR 98 and CRP 29.7.   - Given presence of fevers, chills, and night sweats along with hemoptysis, will r/o pulmonary TB with AFB sputum smear/culture x3.  Lower suspicion for PE at this time, as pt without any CP, SOB, tachycardia, tachypnea, or hypoxia. HIV non-reactive   - f/u Quant Gold    - Airborne precautions   - CXR clear  - CT of chest w/o contrast significant for right upper lobe mixed solid and groundglass patchy groundglass opacity likely pneumonia. Recommended 1 to 3 month follow-up chest CT to ensure resolution and exclude primary lung neoplasm.  - Continue IV protonix 40 mg BID, for concern of Amy-Daniels tear, given recent frequent vomiting  - As pt recently diagnosed with iritis and anterior uveitis, hemoptysis can be from conditions, such as sarcoidosis, SLE, GPA, Goodpasture's, Behcet's, and other vasculitides that have both ocular and pulmonary manifestations. Will check JULI, anti-dsDNA, C3/C4, c-ANCA, and p-ANCA, PR3   - Rheumatology consult, appreciate recommendations   - Will consider pulmonology consult if hemoptysis worsens Pt with 4 total episodes of hemoptysis, each time a dime-sized amount. Possibly associated with fevers, chills, night sweats, and anterior uveitis. Differential for the hemoptysis includes TB, bronchitis, PNA, PE, GI etiology (e.g., Amy-Daniels tear), and autoimmune conditions (e.g., sarcoidosis, SLE, GPA, Goodpasture's). ESR 98 and CRP 29.7.   - Given presence of fevers, chills, and night sweats along with hemoptysis, will r/o pulmonary TB with AFB sputum smear/culture x3.  Lower suspicion for PE at this time, as pt without any CP, SOB, tachycardia, tachypnea, or hypoxia. HIV non-reactive   - f/u Quant Gold    - Airborne precautions   - CXR clear  - CT of chest w/o contrast significant for right upper lobe mixed solid and groundglass patchy groundglass opacity likely pneumonia. Recommended 1 to 3 month follow-up chest CT to ensure resolution and exclude primary lung neoplasm.  - Continue IV protonix 40 mg BID, for concern of Amy-Daniels tear, given recent frequent vomiting  - As pt recently diagnosed with iritis and anterior uveitis, hemoptysis can be from conditions, such as sarcoidosis, SLE, GPA, Goodpasture's, Behcet's, and other vasculitides that have both ocular and pulmonary manifestations. Will check JULI, anti-dsDNA, C3/C4, c-ANCA, and p-ANCA, PR3   - Rheumatology consult, appreciate recommendations and following   - Consulted Pulmonology, due to CT findings and hemoptysis

## 2019-09-19 NOTE — CONSULT NOTE ADULT - SUBJECTIVE AND OBJECTIVE BOX
Patient is a 49y old  Female who presents with a chief complaint of Hemoptysis (19 Sep 2019 07:04)      HPI:  50 yo woman with history of HTN and migraine headaches (well controlled for past 2 years) presents with new onset of hemoptysis. Pt was found to have ground glass and solid opacities within her chest xray. She has signs of anterior uveitis, persistent small hemoptysis and possibly KAN. Pt endorses that she does not have a joint pain, rashes, diarrhea, fevers, recent travel out of the states, vaginal or oral ulcers, eye dryness or vaginal dryness. She endorses that her daughter had sarcoidosis, but that she herself has never been diagnosed as such. She denies any history of smoking, vaping, cooking in closed indoor spaces or other high risk factors for lung disease.    Of note, pt denies any recent travel or known sick contacts but works for the [employer] in which she visits different health care facilities and prisons.    In the ED,  T 100.4, HR , -149/86-90, RR 16-17, O2 sats % RA. WBC 12.06. CXR clear without any cavitary lesions. (17 Sep 2019 22:27)      PAST MEDICAL & SURGICAL HISTORY:  Migraine headache  HTN (hypertension)  History of rotator cuff surgery  Delivery with history of           MEDICATIONS  (STANDING):  azithromycin  IVPB 500 milliGRAM(s) IV Intermittent every 24 hours  cefTRIAXone   IVPB 1000 milliGRAM(s) IV Intermittent every 24 hours  folic acid 1 milliGRAM(s) Oral daily  influenza   Vaccine 0.5 milliLiter(s) IntraMuscular once  labetalol 100 milliGRAM(s) Oral two times a day  prednisoLONE acetate 1% Suspension 1 Drop(s) Both EYES four times a day    MEDICATIONS  (PRN):  acetaminophen   Tablet .. 650 milliGRAM(s) Oral every 6 hours PRN Moderate Pain (4 - 6)  oxyCODONE    IR 5 milliGRAM(s) Oral every 6 hours PRN Severe Pain (7 - 10)                138  |  102  |  9   ----------------------------<  83  3.9   |  23  |  1.91<H>    Ca    8.9      19 Sep 2019 07:15  Phos  4.3       Mg     2.1         TPro  7.6  /  Alb  3.5  /  TBili  0.3  /  DBili  x   /  AST  16  /  ALT  19  /  AlkPhos  121<H>                              9.5    9.73  )-----------( 306      ( 19 Sep 2019 07:15 )             31.5     Urinalysis Basic - ( 18 Sep 2019 01:52 )    Color: LT. YELLOW / Appearance: CLEAR / S.006 / pH: 7.0  Gluc: NEGATIVE / Ketone: NEGATIVE  / Bili: NEGATIVE / Urobili: NORMAL   Blood: NEGATIVE / Protein: NEGATIVE / Nitrite: NEGATIVE   Leuk Esterase: SMALL / RBC: 0-2 / WBC 6-10   Sq Epi: OCC / Non Sq Epi: x / Bacteria: NEGATIVE        Culture - Acid Fast - Sputum w/Smear (collected 19 Sep 2019 08:09)  Source: SPUTUM  Final Report (19 Sep 2019 13:31):    AFB SMEAR= NO ACID FAST BACILLI SEEN    Culture - Respiratory with Gram Stain (collected 18 Sep 2019 06:34)  Source: SPUTUM  Preliminary Report (19 Sep 2019 09:50):    NRF^Normal Respiratory Sabrina    QUANTITY OF GROWTH: MODERATE    Culture - Blood (collected 17 Sep 2019 21:47)  Source: BLOOD VENOUS  Preliminary Report (18 Sep 2019 21:46):    NO ORGANISMS ISOLATED    NO ORGANISMS ISOLATED AT 24 HOURS    Culture - Blood (collected 17 Sep 2019 20:33)  Source: BLOOD  Preliminary Report (18 Sep 2019 20:32):    NO ORGANISMS ISOLATED    NO ORGANISMS ISOLATED AT 24 HOURS

## 2019-09-19 NOTE — CONSULT NOTE ADULT - ATTENDING COMMENTS
I have reviewed the residents note including the history, exam, assessment, and plan.  I agree with the residents assessment and plan.    Lakisha Nunn MD
see note 9/20/19
The acuity of presentation as well as the constitutional symptoms is suggestive of an underlying infectious process-work up ongoing.  Anterior uveitis, pulmonary nodules and history of recurrent sinusitis is suspicious for an underlying vasculitic process, sarcoid is a consideration though pulmonary finding are not typical.  KAN improving, no active sediments in urine-not concerning for GN.  Will complete the serological evaluation.   If infectious work up unrevealing, would recommend further evaluation (pulm eval/role for bronch, CT sinuses...)    Will follow with you

## 2019-09-19 NOTE — PROGRESS NOTE ADULT - PROBLEM SELECTOR PLAN 8
- DVT ppx: Will continue to hold pharmacologic ppx given recent hemoptysis  - Diet: clear liquid diet, will advance as tolerated - DVT ppx: Will continue to hold pharmacologic ppx given recent hemoptysis  - Diet: Regular diet, no plans for procedure

## 2019-09-19 NOTE — PROGRESS NOTE ADULT - PROBLEM SELECTOR PLAN 5
Hgb 10.2 on admission in setting of hemoptysis vs. 10.1 on 9/12/19. Per pt, has h/o anemia and has been on folic acid supplements as a result.   - Monitor H/H and transfuse pRBCs to keep Hgb > 7  - C/w folic acid 1 mg daily  - Type and screen ordered   - Multiple myeloma workup as noted in plan for KAN Stable. Hgb 10.2 on admission in setting of hemoptysis vs. 10.1 on 9/12/19. Per pt, has h/o anemia and has been on folic acid supplements as a result.   - Monitor H/H and transfuse pRBCs to keep Hgb > 7  - C/w folic acid 1 mg daily  - Type and screen ordered   - Folate B12 wnl, iron, TIBC, ferritin wnl   - Multiple myeloma workup as noted in plan for KAN, elevated Lambda/Kappa protein

## 2019-09-19 NOTE — PROGRESS NOTE ADULT - PROBLEM SELECTOR PLAN 7
Incidental left breast 2 x 1.1 cm nodule found on CT chest, r/o malignancy   - Recommend outpatient follow up for mammography and ultrasound Incidental left breast 2 x 1.1 cm nodule found on CT chest, r/o malignancy. Per pt, she had a biopsy 2 years ago, follows with outpatient obgyn    - Recommend outpatient follow up for mammography and ultrasound

## 2019-09-19 NOTE — CONSULT NOTE ADULT - ASSESSMENT
A: 50yo Black female w/ a history of HTN and migraines p/w anterior uveitis, KAN, and persistent hemoptysis    P: Likely cause of her symptoms is an autoimmune disease  - GPA is possible  - Behcet's is unlikely given no history of vaginal or oral ulcers  - Sjogren's is less likely given no clinical dry eyes on examination and possible history of vaginal dryness.  - Sarcoidosis is possible.  - Continue workup as per rheum and follow up results.  - Would not biopsy lymph nodes at this time as they are 1cm in size and we have a possible etiology in autoimmune conditions. Would reassess is workup is negative as an outpatient.  - Repeat CT scan in 3 months as an outpatient.  - Continue Azithromycin and Ceftriaxone for 5 days. A: 48yo Black female w/ a history of HTN and migraines p/w anterior uveitis, KAN, and persistent hemoptysis    P: Likely cause of her symptoms is an autoimmune disease  - GPA is possible  - Behcet's is unlikely given no history of vaginal or oral ulcers  - Sjogren's is less likely given no clinical dry eyes on examination and possible history of vaginal dryness.  - Sarcoidosis is possible.  - Continue workup as per rheum and follow up results.  - Would not biopsy lymph nodes at this time as they are 1cm in size and we have a possible etiology in autoimmune conditions. Would reassess is workup is negative as an outpatient.  - Repeat CT scan in 3 months as an outpatient.  - Continue Azithromycin and Ceftriaxone for 5 days.  - Will check w/ attending regarding bronchoscopy. A: 50yo Black female w/ a history of HTN and migraines p/w anterior uveitis, KAN, and persistent hemoptysis    P: Likely cause of her symptoms is an autoimmune disease +/- pneumonia superimposed  - GPA is possible  - Behcet's is unlikely given no history of vaginal or oral ulcers  - Sjogren's is less likely given no clinical dry eyes on examination and possible history of vaginal dryness.  - Sarcoidosis is possible.  - Continue workup as per rheum and follow up results.  - Would not biopsy lymph nodes at this time as they are 1cm in size and we have a possible etiology in autoimmune conditions. Would reassess is workup is negative as an outpatient.  - Repeat CT scan in 3 months as an outpatient.  - Continue Azithromycin and Ceftriaxone for 5 days.  - Will check w/ attending regarding bronchoscopy.    Fellow addendum:  Suspicious for autoimmune disease given uveitis and that her daughter had sarcoidosis.   Agree with autoimmune workup thus far. Small volume hemoptysis likely due to pneumonia which is improving. Mediastinal and hilar LAD is small and in the setting of pneumonia, probably reactive. It is not classical for sarcoidosis and they are not calcified, however it may be early in the disease course.   MTB is a possibility given possible work exposures. CT findings could be MTB.   Will need to differentiate if uveitis is related to the pulmonary and renal processes or not. There are unifying diagnoses for all three, however it is not clear at this point in time.     Will follow

## 2019-09-19 NOTE — PROGRESS NOTE ADULT - PROBLEM SELECTOR PLAN 2
Sputum culture, prelim gram + cocci in pairs. Pt presented with WBC 12.06 with fever of 100.4F.  S/p ceftriaxone and azithromycin in the ED. CXR clear. CT of chest concerning for pneumonia.   - Will continued IV Ceftriaxone and IV Azithromycin for empiric treatment for CAP   - TB quant test pending- high risk given occupation- pt on respiratory precaution and negative pressure isolation  - f/u Bcx, Ucx

## 2019-09-19 NOTE — PROGRESS NOTE ADULT - PROBLEM SELECTOR PLAN 3
Pt with diagnosis of iritis and anterior uveitis by ophthalmology on Tuesday and prescribed Prednisolone eye drops.  - C/w Prednisolone 1% eye drops qid (prescription confirmed via ophthalmology office note in [facility])  - As anterior uveitis associated with autoimmune conditions, such as sarcoidosis, SLE, GPA, Goodpasture's, Behcet's, other vasculitides, IBD, and rheumatoid arthritis. Will check JULI, anti-dsDNA, C3/C4, c-ANCA, p-ANCA, RF, and anti-CCP.  - Rheumatology consult, appreciate recs   - Pain control with Tylenol PRN for moderate pain and Oxycodone IR 5 mg q6hrs PRN for severe pain Pt with diagnosis of iritis and anterior uveitis by ophthalmology on Tuesday and prescribed Prednisolone eye drops.  - C/w Prednisolone 1% eye drops qid (prescription confirmed via ophthalmology office note in [facility])  - Will consult opthalmology, appreciate recommendations   - As anterior uveitis associated with autoimmune conditions, such as sarcoidosis, SLE, GPA, Goodpasture's, Behcet's, other vasculitides, IBD, and rheumatoid arthritis. Will check JULI, anti-dsDNA, C3/C4, c-ANCA, p-ANCA, RF, and anti-CCP.  - Rheumatology consult, appreciate recs   - Pain control with Tylenol PRN for moderate pain and Oxycodone IR 5 mg q6hrs PRN for severe pain

## 2019-09-20 DIAGNOSIS — R11.0 NAUSEA: ICD-10-CM

## 2019-09-20 LAB
ACE SERPL-CCNC: 51 U/L — SIGNIFICANT CHANGE UP (ref 14–82)
ALBUMIN SERPL ELPH-MCNC: 3.8 G/DL — SIGNIFICANT CHANGE UP (ref 3.3–5)
ALP SERPL-CCNC: 129 U/L — HIGH (ref 40–120)
ALT FLD-CCNC: 18 U/L — SIGNIFICANT CHANGE UP (ref 4–33)
ANA PAT FLD IF-IMP: SIGNIFICANT CHANGE UP
ANA TITR SER: SIGNIFICANT CHANGE UP
ANION GAP SERPL CALC-SCNC: 15 MMO/L — HIGH (ref 7–14)
APPEARANCE UR: CLEAR — SIGNIFICANT CHANGE UP
APTT BLD: 46.6 SEC — HIGH (ref 27.5–36.3)
AST SERPL-CCNC: 17 U/L — SIGNIFICANT CHANGE UP (ref 4–32)
BACTERIA # UR AUTO: NEGATIVE — SIGNIFICANT CHANGE UP
BACTERIA SPT RESP CULT: SIGNIFICANT CHANGE UP
BILIRUB SERPL-MCNC: 0.3 MG/DL — SIGNIFICANT CHANGE UP (ref 0.2–1.2)
BILIRUB UR-MCNC: NEGATIVE — SIGNIFICANT CHANGE UP
BLOOD UR QL VISUAL: NEGATIVE — SIGNIFICANT CHANGE UP
BUN SERPL-MCNC: 11 MG/DL — SIGNIFICANT CHANGE UP (ref 7–23)
CALCIUM SERPL-MCNC: 9.1 MG/DL — SIGNIFICANT CHANGE UP (ref 8.4–10.5)
CHLORIDE SERPL-SCNC: 102 MMOL/L — SIGNIFICANT CHANGE UP (ref 98–107)
CO2 SERPL-SCNC: 20 MMOL/L — LOW (ref 22–31)
COLOR SPEC: SIGNIFICANT CHANGE UP
CREAT SERPL-MCNC: 2.09 MG/DL — HIGH (ref 0.5–1.3)
CULTURE - ACID FAST SMEAR CONCENTRATED: SIGNIFICANT CHANGE UP
DSDNA AB SER-ACNC: 59 IU/ML — HIGH
GAS PNL BLDMV: SIGNIFICANT CHANGE UP
GLUCOSE SERPL-MCNC: 129 MG/DL — HIGH (ref 70–99)
GLUCOSE UR-MCNC: NEGATIVE — SIGNIFICANT CHANGE UP
HAPTOGLOB SERPL-MCNC: 332 MG/DL — HIGH (ref 34–200)
HCT VFR BLD CALC: 31.1 % — LOW (ref 34.5–45)
HGB BLD-MCNC: 9.8 G/DL — LOW (ref 11.5–15.5)
HYALINE CASTS # UR AUTO: NEGATIVE — SIGNIFICANT CHANGE UP
INR BLD: 1.12 — SIGNIFICANT CHANGE UP (ref 0.88–1.17)
KAPPA/LAMBDA FREE LIGHT CHAIN RATIO, SERUM: 1.48 — SIGNIFICANT CHANGE UP
KETONES UR-MCNC: NEGATIVE — SIGNIFICANT CHANGE UP
LDH SERPL L TO P-CCNC: 188 U/L — SIGNIFICANT CHANGE UP (ref 135–225)
LEUKOCYTE ESTERASE UR-ACNC: SIGNIFICANT CHANGE UP
MAGNESIUM SERPL-MCNC: 2.1 MG/DL — SIGNIFICANT CHANGE UP (ref 1.6–2.6)
MCHC RBC-ENTMCNC: 27 PG — SIGNIFICANT CHANGE UP (ref 27–34)
MCHC RBC-ENTMCNC: 31.5 % — LOW (ref 32–36)
MCV RBC AUTO: 85.7 FL — SIGNIFICANT CHANGE UP (ref 80–100)
NITRITE UR-MCNC: NEGATIVE — SIGNIFICANT CHANGE UP
NRBC # FLD: 0.02 K/UL — SIGNIFICANT CHANGE UP (ref 0–0)
PH UR: 6.5 — SIGNIFICANT CHANGE UP (ref 5–8)
PHOSPHATE SERPL-MCNC: 4 MG/DL — SIGNIFICANT CHANGE UP (ref 2.5–4.5)
PLATELET # BLD AUTO: 342 K/UL — SIGNIFICANT CHANGE UP (ref 150–400)
PMV BLD: 10 FL — SIGNIFICANT CHANGE UP (ref 7–13)
POTASSIUM SERPL-MCNC: 3.9 MMOL/L — SIGNIFICANT CHANGE UP (ref 3.5–5.3)
POTASSIUM SERPL-SCNC: 3.9 MMOL/L — SIGNIFICANT CHANGE UP (ref 3.5–5.3)
PROT SERPL-MCNC: 8.2 G/DL — SIGNIFICANT CHANGE UP (ref 6–8.3)
PROT UR-MCNC: 20 — SIGNIFICANT CHANGE UP
PROTHROM AB SERPL-ACNC: 12.5 SEC — SIGNIFICANT CHANGE UP (ref 9.8–13.1)
RBC # BLD: 3.63 M/UL — LOW (ref 3.8–5.2)
RBC # FLD: 14.2 % — SIGNIFICANT CHANGE UP (ref 10.3–14.5)
RBC CASTS # UR COMP ASSIST: SIGNIFICANT CHANGE UP (ref 0–?)
RETICS #: 29 K/UL — SIGNIFICANT CHANGE UP (ref 25–125)
RETICS/RBC NFR: 0.8 % — SIGNIFICANT CHANGE UP (ref 0.5–2.5)
SODIUM SERPL-SCNC: 137 MMOL/L — SIGNIFICANT CHANGE UP (ref 135–145)
SP GR SPEC: 1.01 — SIGNIFICANT CHANGE UP (ref 1–1.04)
SPECIMEN SOURCE: SIGNIFICANT CHANGE UP
SQUAMOUS # UR AUTO: SIGNIFICANT CHANGE UP
UROBILINOGEN FLD QL: NORMAL — SIGNIFICANT CHANGE UP
WBC # BLD: 9.67 K/UL — SIGNIFICANT CHANGE UP (ref 3.8–10.5)
WBC # FLD AUTO: 9.67 K/UL — SIGNIFICANT CHANGE UP (ref 3.8–10.5)
WBC UR QL: HIGH (ref 0–?)

## 2019-09-20 PROCEDURE — 93010 ELECTROCARDIOGRAM REPORT: CPT

## 2019-09-20 PROCEDURE — 99255 IP/OBS CONSLTJ NEW/EST HI 80: CPT | Mod: GC

## 2019-09-20 PROCEDURE — 99233 SBSQ HOSP IP/OBS HIGH 50: CPT | Mod: GC

## 2019-09-20 PROCEDURE — 99222 1ST HOSP IP/OBS MODERATE 55: CPT | Mod: GC

## 2019-09-20 PROCEDURE — 76770 US EXAM ABDO BACK WALL COMP: CPT | Mod: 26

## 2019-09-20 PROCEDURE — 99232 SBSQ HOSP IP/OBS MODERATE 35: CPT | Mod: GC

## 2019-09-20 RX ORDER — ONDANSETRON 8 MG/1
4 TABLET, FILM COATED ORAL EVERY 6 HOURS
Refills: 0 | Status: DISCONTINUED | OUTPATIENT
Start: 2019-09-20 | End: 2019-09-25

## 2019-09-20 RX ORDER — CYCLOPENTOLATE HYDROCHLORIDE 10 MG/ML
1 SOLUTION/ DROPS OPHTHALMIC THREE TIMES A DAY
Refills: 0 | Status: DISCONTINUED | OUTPATIENT
Start: 2019-09-20 | End: 2019-09-24

## 2019-09-20 RX ORDER — CHOLECALCIFEROL (VITAMIN D3) 125 MCG
400 CAPSULE ORAL DAILY
Refills: 0 | Status: DISCONTINUED | OUTPATIENT
Start: 2019-09-20 | End: 2019-09-25

## 2019-09-20 RX ORDER — ONDANSETRON 8 MG/1
4 TABLET, FILM COATED ORAL EVERY 6 HOURS
Refills: 0 | Status: DISCONTINUED | OUTPATIENT
Start: 2019-09-20 | End: 2019-09-20

## 2019-09-20 RX ORDER — ONDANSETRON 8 MG/1
4 TABLET, FILM COATED ORAL ONCE
Refills: 0 | Status: COMPLETED | OUTPATIENT
Start: 2019-09-20 | End: 2019-09-20

## 2019-09-20 RX ADMIN — Medication 650 MILLIGRAM(S): at 05:01

## 2019-09-20 RX ADMIN — CYCLOPENTOLATE HYDROCHLORIDE 1 DROP(S): 10 SOLUTION/ DROPS OPHTHALMIC at 21:18

## 2019-09-20 RX ADMIN — ONDANSETRON 4 MILLIGRAM(S): 8 TABLET, FILM COATED ORAL at 21:18

## 2019-09-20 RX ADMIN — OXYCODONE HYDROCHLORIDE 5 MILLIGRAM(S): 5 TABLET ORAL at 00:10

## 2019-09-20 RX ADMIN — Medication 100 MILLIGRAM(S): at 17:41

## 2019-09-20 RX ADMIN — CEFTRIAXONE 100 MILLIGRAM(S): 500 INJECTION, POWDER, FOR SOLUTION INTRAMUSCULAR; INTRAVENOUS at 13:02

## 2019-09-20 RX ADMIN — OXYCODONE HYDROCHLORIDE 5 MILLIGRAM(S): 5 TABLET ORAL at 13:01

## 2019-09-20 RX ADMIN — CYCLOPENTOLATE HYDROCHLORIDE 1 DROP(S): 10 SOLUTION/ DROPS OPHTHALMIC at 15:43

## 2019-09-20 RX ADMIN — AZITHROMYCIN 250 MILLIGRAM(S): 500 TABLET, FILM COATED ORAL at 17:42

## 2019-09-20 RX ADMIN — Medication 100 MILLIGRAM(S): at 06:42

## 2019-09-20 RX ADMIN — Medication 400 UNIT(S): at 17:41

## 2019-09-20 RX ADMIN — ONDANSETRON 4 MILLIGRAM(S): 8 TABLET, FILM COATED ORAL at 13:01

## 2019-09-20 RX ADMIN — Medication 650 MILLIGRAM(S): at 21:19

## 2019-09-20 RX ADMIN — OXYCODONE HYDROCHLORIDE 5 MILLIGRAM(S): 5 TABLET ORAL at 18:43

## 2019-09-20 RX ADMIN — Medication 650 MILLIGRAM(S): at 22:15

## 2019-09-20 RX ADMIN — Medication 650 MILLIGRAM(S): at 06:00

## 2019-09-20 RX ADMIN — OXYCODONE HYDROCHLORIDE 5 MILLIGRAM(S): 5 TABLET ORAL at 19:43

## 2019-09-20 RX ADMIN — Medication 1 DROP(S): at 13:03

## 2019-09-20 RX ADMIN — Medication 30 MILLILITER(S): at 18:43

## 2019-09-20 RX ADMIN — Medication 1 MILLIGRAM(S): at 13:01

## 2019-09-20 RX ADMIN — Medication 1 DROP(S): at 17:41

## 2019-09-20 RX ADMIN — ONDANSETRON 4 MILLIGRAM(S): 8 TABLET, FILM COATED ORAL at 05:00

## 2019-09-20 RX ADMIN — OXYCODONE HYDROCHLORIDE 5 MILLIGRAM(S): 5 TABLET ORAL at 06:49

## 2019-09-20 RX ADMIN — Medication 1 DROP(S): at 06:41

## 2019-09-20 RX ADMIN — OXYCODONE HYDROCHLORIDE 5 MILLIGRAM(S): 5 TABLET ORAL at 07:45

## 2019-09-20 RX ADMIN — OXYCODONE HYDROCHLORIDE 5 MILLIGRAM(S): 5 TABLET ORAL at 14:01

## 2019-09-20 NOTE — PROGRESS NOTE ADULT - ATTENDING COMMENTS
continue CAP coverage. no significant parenchymal disease on CT chest except RUL ggo probably pna, and few subcm nodules. possible vasculitis, KAN, anterior uveitis. Area of GGO on RUL is small, and bronchoscopy may not be helpful, also as hemoptysis is lessening. Will continue to follow.

## 2019-09-20 NOTE — PROGRESS NOTE ADULT - PROBLEM SELECTOR PLAN 7
Incidental left breast 2 x 1.1 cm nodule found on CT chest, r/o malignancy. Per pt, she had a biopsy 2 years ago, follows with outpatient obgyn    - Recommend outpatient follow up for mammography and ultrasound BPs in acceptable range.   - Will hold pt's home losartan for now given KAN  - Will hold pt's home amlodipine for now given recent hemoptysis  - C/w labetalol 100 mg bid with hold parameters

## 2019-09-20 NOTE — PROGRESS NOTE ADULT - PROBLEM SELECTOR PLAN 8
- DVT ppx: Will continue to hold pharmacologic ppx given recent hemoptysis  - Diet: Regular diet, no plans for procedure Incidental left breast 2 x 1.1 cm nodule found on CT chest, r/o malignancy. Per pt, she had a biopsy 2 years ago, follows with outpatient obgyn    - Recommend outpatient follow up for mammography and ultrasound

## 2019-09-20 NOTE — CONSULT NOTE ADULT - SUBJECTIVE AND OBJECTIVE BOX
[facility] DIVISION OF KIDNEY DISEASES AND HYPERTENSION -- INITIAL CONSULT NOTE  --------------------------------------------------------------------------------  HPI: 50 yo F with HTN and migraine headaches is admitted with hemoptysis and elevated Scr. Pt. is currently being worked up for infectious and rheumatologic causes for hemoptysis. Nephrology team is consulted for evaluation of elevated Scr. Pt. denies any previous history of kidney disease or elevated Scr. No labs prior to 2019 for review in [facility]. Pt. had visited the ER on 19 and was noted to have a normal Scr of 1.1. Pt. returned to the ER on 19 for complaints of hemoptysis, and was found to have an elevated Scr of 1.80, which improved the following day to 1.65, before uptrending to 2.09 today. UA negative for blood and protein. Spot urine TP/CR was WNL. Renal US on 19 with no masses, calculi, or hydronephrosis. HIV was nonreactive, ANCAS were negative, and C3 and C4 were not low. JULI was positive (1:640 titer), dsDNA was borderline elevated, and scleroderma antibodies was positive.    Pt. seen and examined at bedside this afternoon. Pt. states that she was taking Excedrin (containing 250 mg of ASA per pill) x2 pills every 6 hours for perceived migraine headaches from 19 through 19. Pt. takes Losartan at home, but has lost refills and has not taken it for several days. Pt. states that she has no family history of kidney disease except for her daughter who was diagnosed with sarcoidosis with ? of kidney involvement. Pt. currently denies hemoptysis, dysuria, or hematuria. Pt. denies CP, SOB, N/V/F/C.    PAST HISTORY  --------------------------------------------------------------------------------  PAST MEDICAL & SURGICAL HISTORY:  Migraine headache  HTN (hypertension)  History of rotator cuff surgery  Delivery with history of     FAMILY HISTORY:  Family history of hypertension  Family history of type 2 diabetes mellitus  Family history of sarcoidosis with ? kidney involvement    PAST SOCIAL HISTORY:  Denies alcohol/tobacco use    ALLERGIES & MEDICATIONS  --------------------------------------------------------------------------------  Allergies    No Known Allergies    Intolerances    Standing Inpatient Medications  azithromycin  IVPB 500 milliGRAM(s) IV Intermittent every 24 hours  cefTRIAXone   IVPB 1000 milliGRAM(s) IV Intermittent every 24 hours  cholecalciferol 400 Unit(s) Oral daily  cyclopentolate 0.5% Solution 1 Drop(s) Both EYES three times a day  folic acid 1 milliGRAM(s) Oral daily  influenza   Vaccine 0.5 milliLiter(s) IntraMuscular once  labetalol 100 milliGRAM(s) Oral two times a day  prednisoLONE acetate 1% Suspension 1 Drop(s) Both EYES four times a day    REVIEW OF SYSTEMS  --------------------------------------------------------------------------------  Gen: No lethargy  Respiratory: No dyspnea  CV: No chest pain  GI: No abdominal pain  MSK: No LE edema  Neuro: No dizziness  Heme: No bleeding    All other systems were reviewed and are negative, except as noted.    VITALS/PHYSICAL EXAM  --------------------------------------------------------------------------------  T(C): 37.1 (19 @ 15:55), Max: 37.1 (19 @ 20:43)  HR: 78 (19 @ 15:55) (78 - 86)  BP: 123/72 (19 @ 15:55) (123/72 - 138/90)  RR: 17 (19 @ 15:55) (15 - 17)  SpO2: 100% (19 @ 15:55) (95% - 100%)  Wt(kg): --    Physical Exam:  	Gen: NAD, well-appearing  	HEENT: Supple neck   	Pulm: CTA B/L  	CV: RRR, S1S2; no rub  	Abd: +BS, soft, nontender/nondistended  	: No suprapubic tenderness  	LE: No edema b/l  	Skin: Warm, without rashes  	Psych: Normal affect    LABS/STUDIES  --------------------------------------------------------------------------------              9.8    9.67  >-----------<  342      [19 @ 07:30]              31.1     137  |  102  |  11  ----------------------------<  129      [19 @ 07:30]  3.9   |  20  |  2.09        Ca     9.1     [19 07:30]      Mg     2.1     [19 07:30]      Phos  4.0     [19 07:30]    Creatinine Trend:  SCr 2.09 [ 07:30]  SCr 1.91 [ 07:15]  SCr 1.65 [ 07:22]  SCr 1.80 [ 15:02]  SCr 1.11 [ 06:30]    Urinalysis - [19 @ 01:52]      Color LT. YELLOW / Appearance CLEAR / SG 1.006 / pH 7.0      Gluc NEGATIVE / Ketone NEGATIVE  / Bili NEGATIVE / Urobili NORMAL       Blood NEGATIVE / Protein NEGATIVE / Leuk Est SMALL / Nitrite NEGATIVE      RBC 0-2 / WBC 6-10 / Hyaline NEGATIVE / Gran  / Sq Epi OCC / Non Sq Epi  / Bacteria NEGATIVE    Urine Creatinine 197.10      [19 @ 20:50]  Urine Protein 14.7      [19 @ 21:30]  Urine Sodium 66      [19 @ 20:50]  Urine Potassium 33.3      [19 @ 20:50]  Urine Chloride 59      [19 @ 20:50]    HIV Nonreactive   [19 @ 07:22]    JULI: titer 1:640, pattern Homogeneous      [19 @ 07:23]  dsDNA 59      [19 @ 07:22]  C3 Complement 142.8      [19 @ 07:22]  C4 Complement 28.3      [19 @ 07:22]  Rheumatoid Factor < 10.0      [19 @ 07:23]  ANCA: cANCA Negative, pANCA Negative, atypical ANCA --      [19 @ 07:22]  MPO-ANCA 5.0, interpretation: --      [19 @ 07:22]  PR3-ANCA <5.0, interpretation: --      [19 @ 07:22]

## 2019-09-20 NOTE — PROGRESS NOTE ADULT - PROBLEM SELECTOR PLAN 5
Stable. Hgb 10.2 on admission in setting of hemoptysis vs. 10.1 on 9/12/19. Per pt, has h/o anemia and has been on folic acid supplements as a result.   - Monitor H/H and transfuse pRBCs to keep Hgb > 7  - C/w folic acid 1 mg daily  - Type and screen ordered   - Folate B12 wnl, iron, TIBC, ferritin wnl   - Multiple myeloma workup as noted in plan for KAN, elevated Lambda/Kappa protein Pt with diagnosis of iritis and anterior uveitis by ophthalmology on Tuesday and prescribed Prednisolone eye drops.  - C/w Prednisolone 1% eye drops qid (prescription confirmed via ophthalmology office note in [facility])  - Ordered cyclopentolate as per optho recs   - Opthalmology, following, appreciate recommendations   - As anterior uveitis associated with autoimmune conditions, such as sarcoidosis, SLE, GPA, Goodpasture's, Behcet's, other vasculitides, IBD, and rheumatoid arthritis. - C3/C4, c-ANCA, p-ANCA, RF negative so far   - Rheumatology consult, appreciate recs   - Pain control with Tylenol PRN for moderate pain and Oxycodone IR 5 mg q6hrs PRN for severe pain

## 2019-09-20 NOTE — PROGRESS NOTE ADULT - ATTENDING COMMENTS
The acuity of presentation as well as the constitutional symptoms is suggestive of an underlying infectious process-work up ongoing.  Anterior uveitis, pulmonary nodules and history of recurrent sinusitis is suspicious for an underlying vasculitic process, sarcoid is a consideration though pulmonary finding are not typical.  KAN improving, no active sediments in urine-not concerning for GN.  Will complete the serological evaluation.   If infectious work up unrevealing, would recommend further evaluation (pulm eval/role for bronch, CT sinuses...)    Will follow with you Anterior uveitis, pulmonary nodules and hemoptysis. Currently being r/o for active TB.     +JULI, DsDNA and Scl 70 suggestive of an overlap syndrome (SLE/scleroderma), remaining serology pending. ANCA negative though a vasculitic process is still a consideration.   KAN worsening, no active sediments in urine-not concerning for GN.   Discussed with renal, suspecting ASA induced and recommend monitoring for now. IF continues to worsen would recommend a kidney biopsy to elucidate the underlying disease process. Discussed the possibility of SCR though less likely ( normal BP, no evidence of hemolytic anemia/thrombocytopenia, normal urinary sediment)    Patient aware of our assessment and plan.     Will follow with you

## 2019-09-20 NOTE — PROGRESS NOTE ADULT - ASSESSMENT
A: 50yo Black female w/ a history of HTN and migraines p/w anterior uveitis, KAN, and persistent hemoptysis    P: Likely cause of her symptoms is an autoimmune disease +/- pneumonia superimposed  - GPA is possible  - Behcet's is unlikely given no history of vaginal or oral ulcers  - Sjogren's is less likely given no clinical dry eyes on examination and possible history of vaginal dryness.  - Sarcoidosis is possible, but less likely given negative vitamin D results.  - Scleroderma antibodies are lightly positive, but pt. denying CREST symptoms aside from GERD. May be visceral form.  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  - Continue workup as per rheum and follow up results.  - Would not biopsy lymph nodes at this time as they are 1cm in size and we have a possible etiology in autoimmune conditions/PNA. Would reassess is workup is negative as an outpatient.  - Repeat CT scan in 3 months as an outpatient.  - Continue Azithromycin and Ceftriaxone for 5 days.  - Would hold off on bronchoscopy at this time given unimpressive lymph nodes in possible setting of pneumonia. Would re-evaluate if rheum workup returns w/o a possible answer.

## 2019-09-20 NOTE — PROGRESS NOTE ADULT - PROBLEM SELECTOR PLAN 4
Cr on admission 1.8 vs. 1.11 on 9/12/19, downtrended and now increasing, 1.9. Concern for intra-renal, post-renal KAN, will r/o possible autoimmune disorder/vasculitis.   - Will continue to monitor CMP   - Given KAN with anemia and protein gap, will check UPEP, SPEP, serum immunofixation, serum FLC, and quant immunoglobulins for possible multiple myeloma   - Monitor Cr and UOP  - Avoid NSAIDs, ACEi/ARBs, contrast, nephrotoxic agents. Will hold pt's home losartan.  - f/u Renal U/S  - Bladder scan to r/o urinary retention   - Urine protein 14.7   - Renally dose meds Pt with diagnosis of iritis and anterior uveitis by ophthalmology on Tuesday and prescribed Prednisolone eye drops.  - C/w Prednisolone 1% eye drops qid (prescription confirmed via ophthalmology office note in [facility])  - Ordered cyclopentolate as per optho recs   - Opthalmology, following, appreciate recommendations   - As anterior uveitis associated with autoimmune conditions, such as sarcoidosis, SLE, GPA, Goodpasture's, Behcet's, other vasculitides, IBD, and rheumatoid arthritis. - C3/C4, c-ANCA, p-ANCA, RF negative so far   - Rheumatology consult, appreciate recs   - Pain control with Tylenol PRN for moderate pain and Oxycodone IR 5 mg q6hrs PRN for severe pain Pt reports intermittent nausea, improved with intake. Unclear etiology   - Will order PRN Zofran  - EKG repeated on 9/20/19, QTc: 446

## 2019-09-20 NOTE — PROGRESS NOTE ADULT - SUBJECTIVE AND OBJECTIVE BOX
SHERLYN BAZZI  49y  Female    Patient is a 49y old  Female who presents with a chief complaint of Hemoptysis (17 Sep 2019 22:27)    INTERVAL HPI/OVERNIGHT EVENTS: No acute events overnight. Patient reported feeling nauseous this morning, received Ondansetron 4mg.     REVIEW OF SYSTEMS:  Otherwise negative.     Vital Signs Last 24 Hrs  T(C): 36.8 (19 Sep 2019 06:40), Max: 37.3 (18 Sep 2019 20:52)  T(F): 98.2 (19 Sep 2019 06:40), Max: 99.1 (18 Sep 2019 20:52)  HR: 84 (19 Sep 2019 06:40) (80 - 90)  BP: 130/87 (19 Sep 2019 06:40) (130/87 - 146/92)  BP(mean): --  RR: 18 (19 Sep 2019 06:40) (18 - 18)  SpO2: 98% (19 Sep 2019 06:40) (97% - 100%)    PHYSICAL EXAM:  GENERAL: NAD, well-groomed, well-developed  HEAD:  Atraumatic, Normocephalic  EYES: EOMI, conjunctivitis    NECK: Supple, no nuchal rigidity   NERVOUS SYSTEM:  Alert & Oriented X3, Good concentration; Motor Strength  CHEST/LUNG: Breathing comfortably, clear breath sounds bilaterally, No rales, rhonchi, wheezing, or rubs  HEART: Regular rate and rhythm; No murmurs, rubs, or gallops  ABDOMEN: Soft, Nontender, Nondistended; Bowel sounds present  EXTREMITIES:  Tenderness in right medial patella, ROM intact in LE. 2+ Peripheral Pulses, No clubbing, cyanosis, or edema  LYMPH: No cervical, supraclavicular or axillar lymphadenopathy noted.   SKIN: No rashes or lesions    Consultant(s) Notes Reviewed:  [x ] YES  [ ] NO  Care Discussed with Consultants/Other Providers [ x] YES  [ ] NO    LABS:                                   9.5    9.73  )-----------( 306      ( 19 Sep 2019 07:15 )             31.5     -19    138  |  102  |  9   ----------------------------<  83  3.9   |  23  |  1.91<H>    Ca    8.9      19 Sep 2019 07:15  Phos  4.3       Mg     2.1         TPro  7.6  /  Alb  3.5  /  TBili  0.3  /  DBili  x   /  AST  16  /  ALT  19  /  AlkPhos  121<H>        PTT - ( 17 Sep 2019 15:02 )  PTT:49.0 SEC  Urinalysis Basic - ( 18 Sep 2019 01:52 )    Color: LT. YELLOW / Appearance: CLEAR / S.006 / pH: 7.0  Gluc: NEGATIVE / Ketone: NEGATIVE  / Bili: NEGATIVE / Urobili: NORMAL   Blood: NEGATIVE / Protein: NEGATIVE / Nitrite: NEGATIVE   Leuk Esterase: SMALL / RBC: 0-2 / WBC 6-10   Sq Epi: OCC / Non Sq Epi: x / Bacteria: NEGATIVE      CAPILLARY BLOOD GLUCOSE    Urinalysis Basic - ( 18 Sep 2019 01:52 )    Color: LT. YELLOW / Appearance: CLEAR / S.006 / pH: 7.0  Gluc: NEGATIVE / Ketone: NEGATIVE  / Bili: NEGATIVE / Urobili: NORMAL   Blood: NEGATIVE / Protein: NEGATIVE / Nitrite: NEGATIVE   Leuk Esterase: SMALL / RBC: 0-2 / WBC 6-10   Sq Epi: OCC / Non Sq Epi: x / Bacteria: NEGATIVE    RADIOLOGY & ADDITIONAL TESTS:    Imaging Personally Reviewed:  [x ] YES  [ ] NO      EXAM:  CT CHEST        PROCEDURE DATE:  Sep 18 2019       INTERPRETATION:  CLINICAL INDICATION: Hemoptysis.    Axial CT images of the chest are obtained without intravenous   administration of contrast.    No prior chest CTs are available for comparison.    Small or benign-appearing bilateral axillary lymph nodes. Nonspecific   mediastinal lymph nodes with the largest in the right paratracheal   location measuring about 9 mm in short axis. Bilateral hilar lymph nodes   with the largest measuring about 1 cm within the right hilum, likely   reactive.    No pericardial effusion. No pleural effusions.    Evaluation of the upper abdominal organs demonstrate a 9 mm right renal   hypodensity likely a cyst. 2 X 1.1 cm nodule within the inferior aspect   of the left breast.    Evaluation of the lungs demonstrate patchy right upper lobe mixed solid   and groundglass opacity. 3 mm left upper lobe peripheral pulmonary nodule   on image 26 of series 2. Two adjacent tiny nodules within the left lower   lobe on image 83 of series 2 measuring up to 4 mm. No central   endobronchial lesions.    Mild upper thoracic spine scoliosis.    IMPRESSION: Right upper lobe mixed solid and groundglass patchy   groundglass opacity likely pneumonia. A 1 to 3 month follow-up chest CT   is recommended to ensure resolution and exclude primary lung neoplasm.    Mediastinal and hilar lymph nodes and 3 tiny left lung pulmonary nodules   can be monitored on the follow-up CT.    2 x 1.1 cm left breast nodule. Correlation with mammogram and/or   ultrasound is recommended for complete evaluation.    RISHI BENNETT M.D. ATTENDING RADIOLOGIST  This document has been electronically signed. Sep 18 2019  1:59PM        HEALTH ISSUES - PROBLEM Dx:  Need for prophylactic measure: Need for prophylactic measure  Hypertension: Hypertension  Anemia: Anemia  KAN (acute kidney injury): KAN (acute kidney injury)  Leukocytosis: Leukocytosis  Uveitis, anterior: Uveitis, anterior  Hemoptysis: Hemoptysis ROSE MARYSHERLYN  49y  Female    Patient is a 49y old  Female who presents with a chief complaint of Hemoptysis (17 Sep 2019 22:27)    INTERVAL HPI/OVERNIGHT EVENTS:  Afebrile.  Patient reported feeling nauseous this morning, received Ondansetron 4mg. Patient has been taking PRN Acetaminophen and Oxycodone, reports improvement in her eye pain. Reports intermittent nausea is worse when she doesn't have any thing to eat. Reports intermittent chills and night sweats. Denies any joint pain, SOB, HA, leg swelling.      REVIEW OF SYSTEMS:  Otherwise negative.     Vital Signs Last 24 Hrs  T(C): 36.8 (19 Sep 2019 06:40), Max: 37.3 (18 Sep 2019 20:52)  T(F): 98.2 (19 Sep 2019 06:40), Max: 99.1 (18 Sep 2019 20:52)  HR: 84 (19 Sep 2019 06:40) (80 - 90)  BP: 130/87 (19 Sep 2019 06:40) (130/87 - 146/92)  BP(mean): --  RR: 18 (19 Sep 2019 06:40) (18 - 18)  SpO2: 98% (19 Sep 2019 06:40) (97% - 100%)    PHYSICAL EXAM:  GENERAL: NAD, well-groomed, well-developed  HEAD:  Atraumatic, Normocephalic  EYES: EOMI, conjunctivitis    NECK: Supple, no nuchal rigidity   NERVOUS SYSTEM:  Alert & Oriented X3, Good concentration; Motor Strength  CHEST/LUNG: Breathing comfortably, clear breath sounds bilaterally, No rales, rhonchi, wheezing, or rubs  HEART: Regular rate and rhythm; No murmurs, rubs, or gallops  ABDOMEN: Soft, Nontender, Nondistended; Bowel sounds present  EXTREMITIES:  Tenderness in right medial patella, ROM intact in LE. 2+ Peripheral Pulses, No clubbing, cyanosis, or edema  LYMPH: No cervical, supraclavicular or axillar lymphadenopathy noted.   SKIN: No rashes or lesions    Consultant(s) Notes Reviewed:  [x ] YES  [ ] NO  Care Discussed with Consultants/Other Providers [ x] YES  [ ] NO    LABS:                                   9.5    9.73  )-----------( 306      ( 19 Sep 2019 07:15 )             31.5     09-19    138  |  102  |  9   ----------------------------<  83  3.9   |  23  |  1.91<H>    Ca    8.9      19 Sep 2019 07:15  Phos  4.3       Mg     2.1         TPro  7.6  /  Alb  3.5  /  TBili  0.3  /  DBili  x   /  AST  16  /  ALT  19  /  AlkPhos  121<H>        PTT - ( 17 Sep 2019 15:02 )  PTT:49.0 SEC  Urinalysis Basic - ( 18 Sep 2019 01:52 )    Color: LT. YELLOW / Appearance: CLEAR / S.006 / pH: 7.0  Gluc: NEGATIVE / Ketone: NEGATIVE  / Bili: NEGATIVE / Urobili: NORMAL   Blood: NEGATIVE / Protein: NEGATIVE / Nitrite: NEGATIVE   Leuk Esterase: SMALL / RBC: 0-2 / WBC 6-10   Sq Epi: OCC / Non Sq Epi: x / Bacteria: NEGATIVE      CAPILLARY BLOOD GLUCOSE    Urinalysis Basic - ( 18 Sep 2019 01:52 )    Color: LT. YELLOW / Appearance: CLEAR / S.006 / pH: 7.0  Gluc: NEGATIVE / Ketone: NEGATIVE  / Bili: NEGATIVE / Urobili: NORMAL   Blood: NEGATIVE / Protein: NEGATIVE / Nitrite: NEGATIVE   Leuk Esterase: SMALL / RBC: 0-2 / WBC 6-10   Sq Epi: OCC / Non Sq Epi: x / Bacteria: NEGATIVE    RADIOLOGY & ADDITIONAL TESTS:    Imaging Personally Reviewed:  [x ] YES  [ ] NO      EXAM:  CT CHEST        PROCEDURE DATE:  Sep 18 2019       INTERPRETATION:  CLINICAL INDICATION: Hemoptysis.    Axial CT images of the chest are obtained without intravenous   administration of contrast.    No prior chest CTs are available for comparison.    Small or benign-appearing bilateral axillary lymph nodes. Nonspecific   mediastinal lymph nodes with the largest in the right paratracheal   location measuring about 9 mm in short axis. Bilateral hilar lymph nodes   with the largest measuring about 1 cm within the right hilum, likely   reactive.    No pericardial effusion. No pleural effusions.    Evaluation of the upper abdominal organs demonstrate a 9 mm right renal   hypodensity likely a cyst. 2 X 1.1 cm nodule within the inferior aspect   of the left breast.    Evaluation of the lungs demonstrate patchy right upper lobe mixed solid   and groundglass opacity. 3 mm left upper lobe peripheral pulmonary nodule   on image 26 of series 2. Two adjacent tiny nodules within the left lower   lobe on image 83 of series 2 measuring up to 4 mm. No central   endobronchial lesions.    Mild upper thoracic spine scoliosis.    IMPRESSION: Right upper lobe mixed solid and groundglass patchy   groundglass opacity likely pneumonia. A 1 to 3 month follow-up chest CT   is recommended to ensure resolution and exclude primary lung neoplasm.    Mediastinal and hilar lymph nodes and 3 tiny left lung pulmonary nodules   can be monitored on the follow-up CT.    2 x 1.1 cm left breast nodule. Correlation with mammogram and/or   ultrasound is recommended for complete evaluation.    RISHI BENNETT M.D. ATTENDING RADIOLOGIST  This document has been electronically signed. Sep 18 2019  1:59PM        HEALTH ISSUES - PROBLEM Dx:  Need for prophylactic measure: Need for prophylactic measure  Hypertension: Hypertension  Anemia: Anemia  KAN (acute kidney injury): KAN (acute kidney injury)  Leukocytosis: Leukocytosis  Uveitis, anterior: Uveitis, anterior  Hemoptysis: Hemoptysis ROSE MARYSHERLYN  49y  Female    Patient is a 49y old  Female who presents with a chief complaint of Hemoptysis (17 Sep 2019 22:27)    INTERVAL HPI/OVERNIGHT EVENTS:  Afebrile.  Patient reported feeling nauseous this morning, received Ondansetron 4mg. Patient has been taking PRN Acetaminophen and Oxycodone, reports improvement in her eye pain. Reports intermittent nausea is worse when she doesn't have any thing to eat. Reports intermittent chills and night sweats. Denies any joint pain, SOB, HA, leg swelling.      REVIEW OF SYSTEMS:  Otherwise negative.     Vital Signs Last 24 Hrs  T(C): 36.8 (19 Sep 2019 06:40), Max: 37.3 (18 Sep 2019 20:52)  T(F): 98.2 (19 Sep 2019 06:40), Max: 99.1 (18 Sep 2019 20:52)  HR: 84 (19 Sep 2019 06:40) (80 - 90)  BP: 130/87 (19 Sep 2019 06:40) (130/87 - 146/92)  BP(mean): --  RR: 18 (19 Sep 2019 06:40) (18 - 18)  SpO2: 98% (19 Sep 2019 06:40) (97% - 100%)    PHYSICAL EXAM:  GENERAL: NAD, well-groomed, well-developed  HEAD:  Atraumatic, Normocephalic  EYES: EOMI, conjunctivitis    NECK: Supple, no nuchal rigidity   NERVOUS SYSTEM:  Alert & Oriented X3, Good concentration; Motor Strength  CHEST/LUNG: Breathing comfortably, clear breath sounds bilaterally, No rales, rhonchi, wheezing, or rubs  HEART: Regular rate and rhythm; No murmurs, rubs, or gallops  ABDOMEN: Soft, Nontender, Nondistended; Bowel sounds present  EXTREMITIES:  Tenderness in right medial patella, ROM intact in LE. 2+ Peripheral Pulses, No clubbing, cyanosis, or edema  LYMPH: No cervical, supraclavicular or axillar lymphadenopathy noted.   SKIN: No rashes or lesions    Consultant(s) Notes Reviewed:  [x ] YES  [ ] NO  Care Discussed with Consultants/Other Providers [ x] YES  [ ] NO    LABS:                                   9.8    9.67  )-----------( 342      ( 20 Sep 2019 07:30 )             31.1     09-20    137  |  102  |  11  ----------------------------<  129<H>  3.9   |  20<L>  |  2.09<H>    Ca    9.1      20 Sep 2019 07:30  Phos  4.0       Mg     2.1         TPro  8.2  /  Alb  3.8  /  TBili  0.3  /  DBili  x   /  AST  17  /  ALT  18  /  AlkPhos  129<H>        PTT - ( 17 Sep 2019 15:02 )  PTT:49.0 SEC  Urinalysis Basic - ( 18 Sep 2019 01:52 )    Color: LT. YELLOW / Appearance: CLEAR / S.006 / pH: 7.0  Gluc: NEGATIVE / Ketone: NEGATIVE  / Bili: NEGATIVE / Urobili: NORMAL   Blood: NEGATIVE / Protein: NEGATIVE / Nitrite: NEGATIVE   Leuk Esterase: SMALL / RBC: 0-2 / WBC 6-10   Sq Epi: OCC / Non Sq Epi: x / Bacteria: NEGATIVE      CAPILLARY BLOOD GLUCOSE    Urinalysis Basic - ( 18 Sep 2019 01:52 )    Color: LT. YELLOW / Appearance: CLEAR / S.006 / pH: 7.0  Gluc: NEGATIVE / Ketone: NEGATIVE  / Bili: NEGATIVE / Urobili: NORMAL   Blood: NEGATIVE / Protein: NEGATIVE / Nitrite: NEGATIVE   Leuk Esterase: SMALL / RBC: 0-2 / WBC 6-10   Sq Epi: OCC / Non Sq Epi: x / Bacteria: NEGATIVE    RADIOLOGY & ADDITIONAL TESTS:    Imaging Personally Reviewed:  [x ] YES  [ ] NO      EXAM:  CT CHEST        PROCEDURE DATE:  Sep 18 2019       INTERPRETATION:  CLINICAL INDICATION: Hemoptysis.    Axial CT images of the chest are obtained without intravenous   administration of contrast.    No prior chest CTs are available for comparison.    Small or benign-appearing bilateral axillary lymph nodes. Nonspecific   mediastinal lymph nodes with the largest in the right paratracheal   location measuring about 9 mm in short axis. Bilateral hilar lymph nodes   with the largest measuring about 1 cm within the right hilum, likely   reactive.    No pericardial effusion. No pleural effusions.    Evaluation of the upper abdominal organs demonstrate a 9 mm right renal   hypodensity likely a cyst. 2 X 1.1 cm nodule within the inferior aspect   of the left breast.    Evaluation of the lungs demonstrate patchy right upper lobe mixed solid   and groundglass opacity. 3 mm left upper lobe peripheral pulmonary nodule   on image 26 of series 2. Two adjacent tiny nodules within the left lower   lobe on image 83 of series 2 measuring up to 4 mm. No central   endobronchial lesions.    Mild upper thoracic spine scoliosis.    IMPRESSION: Right upper lobe mixed solid and groundglass patchy   groundglass opacity likely pneumonia. A 1 to 3 month follow-up chest CT   is recommended to ensure resolution and exclude primary lung neoplasm.    Mediastinal and hilar lymph nodes and 3 tiny left lung pulmonary nodules   can be monitored on the follow-up CT.    2 x 1.1 cm left breast nodule. Correlation with mammogram and/or   ultrasound is recommended for complete evaluation.    RISHI BENNETT M.D. ATTENDING RADIOLOGIST  This document has been electronically signed. Sep 18 2019  1:59PM        HEALTH ISSUES - PROBLEM Dx:  Need for prophylactic measure: Need for prophylactic measure  Hypertension: Hypertension  Anemia: Anemia  KAN (acute kidney injury): KAN (acute kidney injury)  Leukocytosis: Leukocytosis  Uveitis, anterior: Uveitis, anterior  Hemoptysis: Hemoptysis ROSE MARYSHERLYN  49y  Female    Patient is a 49y old  Female who presents with a chief complaint of Hemoptysis (17 Sep 2019 22:27)    INTERVAL HPI/OVERNIGHT EVENTS:  Afebrile.  Patient reported feeling nauseous this morning, received Ondansetron 4mg. Patient has been taking PRN Acetaminophen and Oxycodone, reports improvement in her eye pain. Reports intermittent nausea is worse when she doesn't have any thing to eat. Reports intermittent chills and night sweats. Reports small dark small tinged blood in septum this morning. Denies any joint pain, SOB, HA, leg swelling.      REVIEW OF SYSTEMS:  Otherwise negative.     Vital Signs Last 24 Hrs  T(C): 36.8 (19 Sep 2019 06:40), Max: 37.3 (18 Sep 2019 20:52)  T(F): 98.2 (19 Sep 2019 06:40), Max: 99.1 (18 Sep 2019 20:52)  HR: 84 (19 Sep 2019 06:40) (80 - 90)  BP: 130/87 (19 Sep 2019 06:40) (130/87 - 146/92)  BP(mean): --  RR: 18 (19 Sep 2019 06:40) (18 - 18)  SpO2: 98% (19 Sep 2019 06:40) (97% - 100%)    PHYSICAL EXAM:  GENERAL: NAD, well-groomed, well-developed  HEAD:  Atraumatic, Normocephalic  EYES: EOMI, conjunctivitis    NECK: Supple, no nuchal rigidity   NERVOUS SYSTEM:  Alert & Oriented X3, Good concentration; Motor Strength  CHEST/LUNG: Breathing comfortably, clear breath sounds bilaterally, No rales, rhonchi, wheezing, or rubs  HEART: Regular rate and rhythm; No murmurs, rubs, or gallops  ABDOMEN: Soft, Nontender, Nondistended; Bowel sounds present  EXTREMITIES:  Tenderness in right medial patella, ROM intact in LE. 2+ Peripheral Pulses, No clubbing, cyanosis, or edema  LYMPH: No cervical, supraclavicular or axillar lymphadenopathy noted.   SKIN: No rashes or lesions    Consultant(s) Notes Reviewed:  [x ] YES  [ ] NO  Care Discussed with Consultants/Other Providers [ x] YES  [ ] NO    LABS:                                   9.8    9.67  )-----------( 342      ( 20 Sep 2019 07:30 )             31.1         137  |  102  |  11  ----------------------------<  129<H>  3.9   |  20<L>  |  2.09<H>    Ca    9.1      20 Sep 2019 07:30  Phos  4.0       Mg     2.1         TPro  8.2  /  Alb  3.8  /  TBili  0.3  /  DBili  x   /  AST  17  /  ALT  18  /  AlkPhos  129<H>  -      PTT - ( 17 Sep 2019 15:02 )  PTT:49.0 SEC  Urinalysis Basic - ( 18 Sep 2019 01:52 )    Color: LT. YELLOW / Appearance: CLEAR / S.006 / pH: 7.0  Gluc: NEGATIVE / Ketone: NEGATIVE  / Bili: NEGATIVE / Urobili: NORMAL   Blood: NEGATIVE / Protein: NEGATIVE / Nitrite: NEGATIVE   Leuk Esterase: SMALL / RBC: 0-2 / WBC 6-10   Sq Epi: OCC / Non Sq Epi: x / Bacteria: NEGATIVE      CAPILLARY BLOOD GLUCOSE    Urinalysis Basic - ( 18 Sep 2019 01:52 )    Color: LT. YELLOW / Appearance: CLEAR / S.006 / pH: 7.0  Gluc: NEGATIVE / Ketone: NEGATIVE  / Bili: NEGATIVE / Urobili: NORMAL   Blood: NEGATIVE / Protein: NEGATIVE / Nitrite: NEGATIVE   Leuk Esterase: SMALL / RBC: 0-2 / WBC 6-10   Sq Epi: OCC / Non Sq Epi: x / Bacteria: NEGATIVE    RADIOLOGY & ADDITIONAL TESTS:    Imaging Personally Reviewed:  [x ] YES  [ ] NO      EXAM:  CT CHEST        PROCEDURE DATE:  Sep 18 2019       INTERPRETATION:  CLINICAL INDICATION: Hemoptysis.    Axial CT images of the chest are obtained without intravenous   administration of contrast.    No prior chest CTs are available for comparison.    Small or benign-appearing bilateral axillary lymph nodes. Nonspecific   mediastinal lymph nodes with the largest in the right paratracheal   location measuring about 9 mm in short axis. Bilateral hilar lymph nodes   with the largest measuring about 1 cm within the right hilum, likely   reactive.    No pericardial effusion. No pleural effusions.    Evaluation of the upper abdominal organs demonstrate a 9 mm right renal   hypodensity likely a cyst. 2 X 1.1 cm nodule within the inferior aspect   of the left breast.    Evaluation of the lungs demonstrate patchy right upper lobe mixed solid   and groundglass opacity. 3 mm left upper lobe peripheral pulmonary nodule   on image 26 of series 2. Two adjacent tiny nodules within the left lower   lobe on image 83 of series 2 measuring up to 4 mm. No central   endobronchial lesions.    Mild upper thoracic spine scoliosis.    IMPRESSION: Right upper lobe mixed solid and groundglass patchy   groundglass opacity likely pneumonia. A 1 to 3 month follow-up chest CT   is recommended to ensure resolution and exclude primary lung neoplasm.    Mediastinal and hilar lymph nodes and 3 tiny left lung pulmonary nodules   can be monitored on the follow-up CT.    2 x 1.1 cm left breast nodule. Correlation with mammogram and/or   ultrasound is recommended for complete evaluation.    RISHI BENNETT M.D. ATTENDING RADIOLOGIST  This document has been electronically signed. Sep 18 2019  1:59PM        HEALTH ISSUES - PROBLEM Dx:  Need for prophylactic measure: Need for prophylactic measure  Hypertension: Hypertension  Anemia: Anemia  KAN (acute kidney injury): KAN (acute kidney injury)  Leukocytosis: Leukocytosis  Uveitis, anterior: Uveitis, anterior  Hemoptysis: Hemoptysis EMILIA BAZZITANYA  49y  Female    Patient is a 49y old  Female who presents with a chief complaint of Hemoptysis (17 Sep 2019 22:27)    INTERVAL HPI/OVERNIGHT EVENTS:  Afebrile.  Patient reported feeling nauseous this morning, received once time dose of Ondansetron 4mg. Received PRN Acetaminophen and Oxycodone for headache and eye pain, reports improvement in her eye pain. Reports intermittent nausea, improved when she eats. Reports intermittent chills and night sweats. Reports small dark small tinged blood in septum this morning. Denies any joint pain, hematuria, dysuria, SOB, HA, leg swelling.  Overall, reports feeling better.     REVIEW OF SYSTEMS:  Otherwise negative.     Vital Signs Last 24 Hrs  T(C): 36.8 (19 Sep 2019 06:40), Max: 37.3 (18 Sep 2019 20:52)  T(F): 98.2 (19 Sep 2019 06:40), Max: 99.1 (18 Sep 2019 20:52)  HR: 84 (19 Sep 2019 06:40) (80 - 90)  BP: 130/87 (19 Sep 2019 06:40) (130/87 - 146/92)  BP(mean): --  RR: 18 (19 Sep 2019 06:40) (18 - 18)  SpO2: 98% (19 Sep 2019 06:40) (97% - 100%)    PHYSICAL EXAM:  GENERAL: NAD, well-groomed, well-developed  HEAD:  Atraumatic, Normocephalic  EYES: EOMI, conjunctivitis    NECK: Supple, no nuchal rigidity   NERVOUS SYSTEM:  Alert & Oriented X3, Good concentration; Motor Strength  CHEST/LUNG: Breathing comfortably, clear breath sounds bilaterally, No rales, rhonchi, wheezing, or rubs  HEART: Regular rate and rhythm; No murmurs, rubs, or gallops  ABDOMEN: Soft, Nontender, Nondistended; Bowel sounds present  EXTREMITIES:  Tenderness in right medial patella, ROM intact in LE. 2+ Peripheral Pulses, No clubbing, cyanosis, or edema  LYMPH: No cervical, supraclavicular or axillar lymphadenopathy noted.   SKIN: No rashes or lesions    Consultant(s) Notes Reviewed:  [x ] YES  [ ] NO  Care Discussed with Consultants/Other Providers [ x] YES  [ ] NO    LABS:                                   9.8    9.67  )-----------( 342      ( 20 Sep 2019 07:30 )             31.1         137  |  102  |  11  ----------------------------<  129<H>  3.9   |  20<L>  |  2.09<H>    Ca    9.1      20 Sep 2019 07:30  Phos  4.0       Mg     2.1         TPro  8.2  /  Alb  3.8  /  TBili  0.3  /  DBili  x   /  AST  17  /  ALT  18  /  AlkPhos  129<H>        PTT - ( 17 Sep 2019 15:02 )  PTT:49.0 SEC  Urinalysis Basic - ( 18 Sep 2019 01:52 )    Color: LT. YELLOW / Appearance: CLEAR / S.006 / pH: 7.0  Gluc: NEGATIVE / Ketone: NEGATIVE  / Bili: NEGATIVE / Urobili: NORMAL   Blood: NEGATIVE / Protein: NEGATIVE / Nitrite: NEGATIVE   Leuk Esterase: SMALL / RBC: 0-2 / WBC 6-10   Sq Epi: OCC / Non Sq Epi: x / Bacteria: NEGATIVE      CAPILLARY BLOOD GLUCOSE    Urinalysis Basic - ( 18 Sep 2019 01:52 )    Color: LT. YELLOW / Appearance: CLEAR / S.006 / pH: 7.0  Gluc: NEGATIVE / Ketone: NEGATIVE  / Bili: NEGATIVE / Urobili: NORMAL   Blood: NEGATIVE / Protein: NEGATIVE / Nitrite: NEGATIVE   Leuk Esterase: SMALL / RBC: 0-2 / WBC 6-10   Sq Epi: OCC / Non Sq Epi: x / Bacteria: NEGATIVE    RADIOLOGY & ADDITIONAL TESTS:    Imaging Personally Reviewed:  [x ] YES  [ ] NO      EXAM:  CT CHEST        PROCEDURE DATE:  Sep 18 2019       INTERPRETATION:  CLINICAL INDICATION: Hemoptysis.    Axial CT images of the chest are obtained without intravenous   administration of contrast.    No prior chest CTs are available for comparison.    Small or benign-appearing bilateral axillary lymph nodes. Nonspecific   mediastinal lymph nodes with the largest in the right paratracheal   location measuring about 9 mm in short axis. Bilateral hilar lymph nodes   with the largest measuring about 1 cm within the right hilum, likely   reactive.    No pericardial effusion. No pleural effusions.    Evaluation of the upper abdominal organs demonstrate a 9 mm right renal   hypodensity likely a cyst. 2 X 1.1 cm nodule within the inferior aspect   of the left breast.    Evaluation of the lungs demonstrate patchy right upper lobe mixed solid   and groundglass opacity. 3 mm left upper lobe peripheral pulmonary nodule   on image 26 of series 2. Two adjacent tiny nodules within the left lower   lobe on image 83 of series 2 measuring up to 4 mm. No central   endobronchial lesions.    Mild upper thoracic spine scoliosis.    IMPRESSION: Right upper lobe mixed solid and groundglass patchy   groundglass opacity likely pneumonia. A 1 to 3 month follow-up chest CT   is recommended to ensure resolution and exclude primary lung neoplasm.    Mediastinal and hilar lymph nodes and 3 tiny left lung pulmonary nodules   can be monitored on the follow-up CT.    2 x 1.1 cm left breast nodule. Correlation with mammogram and/or   ultrasound is recommended for complete evaluation.    RISHI BENNETT M.D. ATTENDING RADIOLOGIST  This document has been electronically signed. Sep 18 2019  1:59PM        HEALTH ISSUES - PROBLEM Dx:  Need for prophylactic measure: Need for prophylactic measure  Hypertension: Hypertension  Anemia: Anemia  KAN (acute kidney injury): KAN (acute kidney injury)  Leukocytosis: Leukocytosis  Uveitis, anterior: Uveitis, anterior  Hemoptysis: Hemoptysis SHERLYN BAZZI  49y  Female    Patient is a 49y old  Female who presents with a chief complaint of Hemoptysis (17 Sep 2019 22:27)    INTERVAL HPI/OVERNIGHT EVENTS:  Afebrile.  Patient reported feeling nauseous this morning, received once time dose of Ondansetron 4mg. Received PRN Acetaminophen and Oxycodone for headache and eye pain, reports improvement in her eye pain. Reports intermittent nausea, improved when she eats. Reports intermittent chills and night sweats. Reports small dark small tinged blood in septum this morning. Denies any joint pain, hematuria, dysuria, SOB, HA, leg swelling.  Overall, reports feeling better.     REVIEW OF SYSTEMS:  Otherwise negative.     Vital Signs Last 24 Hrs  T(C): 36.8 (19 Sep 2019 06:40), Max: 37.3 (18 Sep 2019 20:52)  T(F): 98.2 (19 Sep 2019 06:40), Max: 99.1 (18 Sep 2019 20:52)  HR: 84 (19 Sep 2019 06:40) (80 - 90)  BP: 130/87 (19 Sep 2019 06:40) (130/87 - 146/92)  BP(mean): --  RR: 18 (19 Sep 2019 06:40) (18 - 18)  SpO2: 98% (19 Sep 2019 06:40) (97% - 100%)    PHYSICAL EXAM:  GENERAL: NAD, well-groomed, well-developed  HEAD:  Atraumatic, Normocephalic, no temporal tenderness, no frontal or maxillary sinus tenderness.   EYES: EOMI, conjunctivitis    NECK: Supple, no nuchal rigidity   NERVOUS SYSTEM:  Alert & Oriented X3, Good concentration; Motor Strength  CHEST/LUNG: Breathing comfortably, clear breath sounds bilaterally, No rales, rhonchi, wheezing, or rubs  HEART: Regular rate and rhythm; No murmurs, rubs, or gallops  ABDOMEN: Soft, Nontender, Nondistended; Bowel sounds present  EXTREMITIES:  Tenderness in right medial patella, ROM intact in LE. 2+ Peripheral Pulses, No clubbing, cyanosis, or edema  LYMPH: No cervical, supraclavicular or axillar lymphadenopathy noted.   SKIN: No rashes or lesions    Consultant(s) Notes Reviewed:  [x ] YES  [ ] NO  Care Discussed with Consultants/Other Providers [ x] YES  [ ] NO    LABS:                                   9.8    9.67  )-----------( 342      ( 20 Sep 2019 07:30 )             31.1         137  |  102  |  11  ----------------------------<  129<H>  3.9   |  20<L>  |  2.09<H>    Ca    9.1      20 Sep 2019 07:30  Phos  4.0       Mg     2.1         TPro  8.2  /  Alb  3.8  /  TBili  0.3  /  DBili  x   /  AST  17  /  ALT  18  /  AlkPhos  129<H>        PTT - ( 17 Sep 2019 15:02 )  PTT:49.0 SEC  Urinalysis Basic - ( 18 Sep 2019 01:52 )    Color: LT. YELLOW / Appearance: CLEAR / S.006 / pH: 7.0  Gluc: NEGATIVE / Ketone: NEGATIVE  / Bili: NEGATIVE / Urobili: NORMAL   Blood: NEGATIVE / Protein: NEGATIVE / Nitrite: NEGATIVE   Leuk Esterase: SMALL / RBC: 0-2 / WBC 6-10   Sq Epi: OCC / Non Sq Epi: x / Bacteria: NEGATIVE      CAPILLARY BLOOD GLUCOSE    Urinalysis Basic - ( 18 Sep 2019 01:52 )    Color: LT. YELLOW / Appearance: CLEAR / S.006 / pH: 7.0  Gluc: NEGATIVE / Ketone: NEGATIVE  / Bili: NEGATIVE / Urobili: NORMAL   Blood: NEGATIVE / Protein: NEGATIVE / Nitrite: NEGATIVE   Leuk Esterase: SMALL / RBC: 0-2 / WBC 6-10   Sq Epi: OCC / Non Sq Epi: x / Bacteria: NEGATIVE    RADIOLOGY & ADDITIONAL TESTS:    Imaging Personally Reviewed:  [x ] YES  [ ] NO      EXAM:  CT CHEST        PROCEDURE DATE:  Sep 18 2019       INTERPRETATION:  CLINICAL INDICATION: Hemoptysis.    Axial CT images of the chest are obtained without intravenous   administration of contrast.    No prior chest CTs are available for comparison.    Small or benign-appearing bilateral axillary lymph nodes. Nonspecific   mediastinal lymph nodes with the largest in the right paratracheal   location measuring about 9 mm in short axis. Bilateral hilar lymph nodes   with the largest measuring about 1 cm within the right hilum, likely   reactive.    No pericardial effusion. No pleural effusions.    Evaluation of the upper abdominal organs demonstrate a 9 mm right renal   hypodensity likely a cyst. 2 X 1.1 cm nodule within the inferior aspect   of the left breast.    Evaluation of the lungs demonstrate patchy right upper lobe mixed solid   and groundglass opacity. 3 mm left upper lobe peripheral pulmonary nodule   on image 26 of series 2. Two adjacent tiny nodules within the left lower   lobe on image 83 of series 2 measuring up to 4 mm. No central   endobronchial lesions.    Mild upper thoracic spine scoliosis.    IMPRESSION: Right upper lobe mixed solid and groundglass patchy   groundglass opacity likely pneumonia. A 1 to 3 month follow-up chest CT   is recommended to ensure resolution and exclude primary lung neoplasm.    Mediastinal and hilar lymph nodes and 3 tiny left lung pulmonary nodules   can be monitored on the follow-up CT.    2 x 1.1 cm left breast nodule. Correlation with mammogram and/or   ultrasound is recommended for complete evaluation.    RISHI BENNETT M.D. ATTENDING RADIOLOGIST  This document has been electronically signed. Sep 18 2019  1:59PM        HEALTH ISSUES - PROBLEM Dx:  Need for prophylactic measure: Need for prophylactic measure  Hypertension: Hypertension  Anemia: Anemia  KAN (acute kidney injury): KAN (acute kidney injury)  Leukocytosis: Leukocytosis  Uveitis, anterior: Uveitis, anterior  Hemoptysis: Hemoptysis

## 2019-09-20 NOTE — PROGRESS NOTE ADULT - PROBLEM SELECTOR PLAN 6
BPs in acceptable range.   - Will hold pt's home losartan for now given KAN  - Will hold pt's home amlodipine for now given recent hemoptysis  - C/w labetalol 100 mg bid with hold parameters Stable. Hgb 10.2 on admission in setting of hemoptysis vs. 10.1 on 9/12/19. Per pt, has h/o anemia and has been on folic acid supplements as a result.   - Monitor H/H and transfuse pRBCs to keep Hgb > 7  - C/w folic acid 1 mg daily  - Type and screen ordered   - Folate B12 wnl, iron, TIBC, ferritin wnl   - Multiple myeloma workup as noted in plan for KAN, elevated Lambda/Kappa protein

## 2019-09-20 NOTE — PROGRESS NOTE ADULT - ASSESSMENT
50 yo  female, domiciled with family, employed by [employer], with history of HTN and migraine headaches (well controlled for past 2 years) presents with new onset of hemoptysis associated with N/V, fever and leukocytosis and recent diagnosis of iritis and anterior uveitis, concerning for pneumonia complicated by potential underlying autoimmune disorder. 50 yo  female, domiciled with family, employed by [employer], with history of HTN and migraine headaches (well controlled for past 2 years) presents with new onset of hemoptysis associated with N/V, fever and leukocytosis and recent diagnosis of iritis and anterior uveitis, concerning for pneumonia complicated by potential underlying autoimmune disorder.

## 2019-09-20 NOTE — PROGRESS NOTE ADULT - PROBLEM SELECTOR PLAN 3
Pt with diagnosis of iritis and anterior uveitis by ophthalmology on Tuesday and prescribed Prednisolone eye drops.  - C/w Prednisolone 1% eye drops qid (prescription confirmed via ophthalmology office note in [facility]IE)  - Will consult opthalmology, appreciate recommendations   - As anterior uveitis associated with autoimmune conditions, such as sarcoidosis, SLE, GPA, Goodpasture's, Behcet's, other vasculitides, IBD, and rheumatoid arthritis. Will check JULI, anti-dsDNA, C3/C4, c-ANCA, p-ANCA, RF, and anti-CCP.  - Rheumatology consult, appreciate recs   - Pain control with Tylenol PRN for moderate pain and Oxycodone IR 5 mg q6hrs PRN for severe pain Pt with diagnosis of iritis and anterior uveitis by ophthalmology on Tuesday and prescribed Prednisolone eye drops.  - C/w Prednisolone 1% eye drops qid (prescription confirmed via ophthalmology office note in HIE)  -  consulted opthalmology, appreciate recommendations   - As anterior uveitis associated with autoimmune conditions, such as sarcoidosis, SLE, GPA, Goodpasture's, Behcet's, other vasculitides, IBD, and rheumatoid arthritis. Will check JULI, anti-dsDNA, C3/C4, c-ANCA, p-ANCA, RF, and anti-CCP.  - Rheumatology consult, appreciate recs   - Pain control with Tylenol PRN for moderate pain and Oxycodone IR 5 mg q6hrs PRN for severe pain Pt with diagnosis of iritis and anterior uveitis by ophthalmology on Tuesday and prescribed Prednisolone eye drops.  - C/w Prednisolone 1% eye drops qid (prescription confirmed via ophthalmology office note in HIE)  - Ordered cyclopentolate as per optho recs   - Opthalmology, following, appreciate recommendations   - As anterior uveitis associated with autoimmune conditions, such as sarcoidosis, SLE, GPA, Goodpasture's, Behcet's, other vasculitides, IBD, and rheumatoid arthritis. - C3/C4, c-ANCA, p-ANCA, RF negative so far   - Rheumatology consult, appreciate recs   - Pain control with Tylenol PRN for moderate pain and Oxycodone IR 5 mg q6hrs PRN for severe pain Sputum culture, prelim gram + cocci in pairs. Pt presented with WBC 12.06 with fever of 100.4F.  S/p ceftriaxone and azithromycin in the ED. CXR clear. CT of chest concerning for pneumonia.   - Will continued IV Ceftriaxone and IV Azithromycin for empiric treatment for CAP for 5 days, will D/C tomorrow   - TB quant test pending- high risk given occupation- pt on respiratory precaution and negative pressure isolation  - Septum cultures, negative acid fast thus far   - Bcx no growth, Ucx no growth in 48 hrs.

## 2019-09-20 NOTE — PROGRESS NOTE ADULT - SUBJECTIVE AND OBJECTIVE BOX
CHIEF COMPLAINT:    Interval Events: Pt endorses that her right headache has improved. No acute overnight events. Endorses a small amount of hemoptysis this morning. Endorses possible fevers and chills intermittently.    OBJECTIVE:  ICU Vital Signs Last 24 Hrs  T(C): 36.9 (20 Sep 2019 06:36), Max: 37.1 (19 Sep 2019 20:43)  T(F): 98.5 (20 Sep 2019 06:36), Max: 98.7 (19 Sep 2019 20:43)  HR: 82 (20 Sep 2019 06:36) (76 - 89)  BP: 138/90 (20 Sep 2019 06:36) (123/80 - 138/90)  BP(mean): --  ABP: --  ABP(mean): --  RR: 16 (20 Sep 2019 06:36) (15 - 18)  SpO2: 95% (20 Sep 2019 06:36) (95% - 100%)        CAPILLARY BLOOD GLUCOSE          PHYSICAL EXAM:  General:   HEENT:   Lymph Nodes:  Neck:   Respiratory: CTABL  Cardiovascular:   Abdomen:   Extremities:   Skin:   Neurological:  Psychiatry:    HOSPITAL MEDICATIONS:  MEDICATIONS  (STANDING):  azithromycin  IVPB 500 milliGRAM(s) IV Intermittent every 24 hours  cefTRIAXone   IVPB 1000 milliGRAM(s) IV Intermittent every 24 hours  cyclopentolate 0.5% Solution 1 Drop(s) Both EYES three times a day  folic acid 1 milliGRAM(s) Oral daily  influenza   Vaccine 0.5 milliLiter(s) IntraMuscular once  labetalol 100 milliGRAM(s) Oral two times a day  prednisoLONE acetate 1% Suspension 1 Drop(s) Both EYES four times a day    MEDICATIONS  (PRN):  acetaminophen   Tablet .. 650 milliGRAM(s) Oral every 6 hours PRN Moderate Pain (4 - 6)  oxyCODONE    IR 5 milliGRAM(s) Oral every 6 hours PRN Severe Pain (7 - 10)      LABS:                        9.8    9.67  )-----------( 342      ( 20 Sep 2019 07:30 )             31.1     Hgb Trend: 9.8<--, 9.5<--, 9.3<--, 10.2<--  09-20    137  |  102  |  11  ----------------------------<  129<H>  3.9   |  20<L>  |  2.09<H>    Ca    9.1      20 Sep 2019 07:30  Phos  4.0     09-20  Mg     2.1     09-20    TPro  8.2  /  Alb  3.8  /  TBili  0.3  /  DBili  x   /  AST  17  /  ALT  18  /  AlkPhos  129<H>  09-20    Creatinine Trend: 2.09<--, 1.91<--, 1.65<--, 1.80<--, 1.11<--  PT/INR - ( 20 Sep 2019 07:30 )   PT: 12.5 SEC;   INR: 1.12          PTT - ( 20 Sep 2019 07:30 )  PTT:46.6 SEC          MICROBIOLOGY:     Culture - Acid Fast - Sputum w/Smear (collected 19 Sep 2019 08:09)  Source: SPUTUM  Final Report (19 Sep 2019 13:31):    AFB SMEAR= NO ACID FAST BACILLI SEEN    Culture - Respiratory with Gram Stain (collected 18 Sep 2019 06:34)  Source: SPUTUM  Preliminary Report (19 Sep 2019 09:50):    NRF^Normal Respiratory Sabrina    QUANTITY OF GROWTH: MODERATE    Culture - Blood (collected 17 Sep 2019 21:47)  Source: BLOOD VENOUS  Preliminary Report (19 Sep 2019 21:46):    NO ORGANISMS ISOLATED    NO ORGANISMS ISOLATED AT 48 HRS.    Culture - Blood (collected 17 Sep 2019 20:33)  Source: BLOOD  Preliminary Report (19 Sep 2019 20:32):    NO ORGANISMS ISOLATED    NO ORGANISMS ISOLATED AT 48 HRS.        RADIOLOGY:  [ ] Reviewed and interpreted by me    PULMONARY FUNCTION TESTS:    EKG:

## 2019-09-20 NOTE — PROGRESS NOTE ADULT - SUBJECTIVE AND OBJECTIVE BOX
Eduardo Suárez MD PGY1   Pager: [phone] [place], [phone] [facility]  After 7: Night Float pager    Patient is a 49y old  Female who presents with a chief complaint of Hemoptysis (20 Sep 2019 10:08)      SUBJECTIVE / OVERNIGHT EVENTS: Overnight, no acute events. PAtient seen and examined at bedside this AM. She reports small volume hemoptysis this AM and some persistent chills. otherwise she denies acute complaints.     MEDICATIONS  (STANDING):  azithromycin  IVPB 500 milliGRAM(s) IV Intermittent every 24 hours  cefTRIAXone   IVPB 1000 milliGRAM(s) IV Intermittent every 24 hours  cholecalciferol 400 Unit(s) Oral daily  cyclopentolate 0.5% Solution 1 Drop(s) Both EYES three times a day  folic acid 1 milliGRAM(s) Oral daily  influenza   Vaccine 0.5 milliLiter(s) IntraMuscular once  labetalol 100 milliGRAM(s) Oral two times a day  prednisoLONE acetate 1% Suspension 1 Drop(s) Both EYES four times a day    MEDICATIONS  (PRN):  acetaminophen   Tablet .. 650 milliGRAM(s) Oral every 6 hours PRN Moderate Pain (4 - 6)  oxyCODONE    IR 5 milliGRAM(s) Oral every 6 hours PRN Severe Pain (7 - 10)      Vital Signs Last 24 Hrs  T(C): 36.9 (20 Sep 2019 06:36), Max: 37.1 (19 Sep 2019 20:43)  T(F): 98.5 (20 Sep 2019 06:36), Max: 98.7 (19 Sep 2019 20:43)  HR: 82 (20 Sep 2019 06:36) (79 - 89)  BP: 138/90 (20 Sep 2019 06:36) (123/80 - 138/90)  BP(mean): --  RR: 16 (20 Sep 2019 06:36) (15 - 18)  SpO2: 95% (20 Sep 2019 06:36) (95% - 100%)  CAPILLARY BLOOD GLUCOSE        I&O's Summary      PHYSICAL EXAM:  GENERAL: NAD, well-developed  EYES: EOMI, PERRLA, conjunctiva and sclera clear  NECK:  No JVD  CHEST/LUNG: CTABL ; No wheeze  HEART: RRR; No murmurs  ABDOMEN: Soft, Nontender, Nondistended; Bowel sounds present  : No Barrett  EXTREMITIES:  2+ Peripheral Pulses, No edema  PSYCH: AAOx3  NEUROLOGY: non-focal  SKIN: No rashes or lesions. No sacral ulcer    LABS:    Double Stranded DNA Antibody: 59: Method: EIA  Reference Ranges  Interpretation  <= 29    IU/mL     Negative  30 - 75  IU/mL     Borderline  > 75      IU/mL     Positive IU/mL (09.18.19 @ 07:22)    JULI Pattern: Homogeneous (09.18.19 @ 07:23)    Scleroderma Antibodies: 1.2: INFCE Result Units: [id number]  Fluorescent Bead Immunoassay  Scl 70  Antibodies, IgG  <1.0 AI (negative)  > or =1.0 AI (positive)  Reference values apply to all ages. (09.19.19 @ 07:15)      Sjogren&#x27;s Syndrome Antibodies (09.19.19 @ 07:15)    Anti SS-A Antibody: <0.2: INFCE Result Units: [id number]    Anti SS-B Antibody: <0.2: INFCE Result Units: [id number]    Neutrophil Cytoplasmic Antibody (09.18.19 @ 07:22)    Cytoplasmic (c-ANCA) Antibody: Negative    Perinuclear (p-ANCA) Antibody: Negative    Rheumatoid Factor Quant, Serum or Plasma in AM (09.18.19 @ 07:23)    Rheumatoid Factor Quant, Serum or Plasma: < 10.0:       Vitamin D, 25-Hydroxy (09.19.19 @ 07:15)    Vitamin D, 25-Hydroxy: 18.4    Vitamin D, 1, 25-Dihydroxy (09.19.19 @ 07:15)    Vitamin D, 1, 25-Dihydroxy: 59.8 pg/mL                                    9.8    9.67  )-----------( 342      ( 20 Sep 2019 07:30 )             31.1     09-20    137  |  102  |  11  ----------------------------<  129<H>  3.9   |  20<L>  |  2.09<H>    Ca    9.1      20 Sep 2019 07:30  Phos  4.0     09-20  Mg     2.1     09-20    TPro  8.2  /  Alb  3.8  /  TBili  0.3  /  DBili  x   /  AST  17  /  ALT  18  /  AlkPhos  129<H>  09-20    PT/INR - ( 20 Sep 2019 07:30 )   PT: 12.5 SEC;   INR: 1.12          PTT - ( 20 Sep 2019 07:30 )  PTT:46.6 SEC          Microbiology;        RADIOLOGY & ADDITIONAL TESTS:    Imaging Personally Reviewed:

## 2019-09-20 NOTE — PROGRESS NOTE ADULT - PROBLEM SELECTOR PLAN 1
Pt with 4 total episodes of hemoptysis, each time a dime-sized amount. Possibly associated with fevers, chills, night sweats, and anterior uveitis. Differential for the hemoptysis includes TB, bronchitis, PNA, PE, GI etiology (e.g., Amy-Daniels tear), and autoimmune conditions (e.g., sarcoidosis, SLE, GPA, Goodpasture's). ESR 98 and CRP 29.7.   - Given presence of fevers, chills, and night sweats along with hemoptysis, will r/o pulmonary TB with AFB sputum smear/culture x3.  Lower suspicion for PE at this time, as pt without any CP, SOB, tachycardia, tachypnea, or hypoxia. HIV non-reactive   - f/u Quant Gold    - Airborne precautions   - CXR clear  - CT of chest w/o contrast significant for right upper lobe mixed solid and groundglass patchy groundglass opacity likely pneumonia. Recommended 1 to 3 month follow-up chest CT to ensure resolution and exclude primary lung neoplasm.  - Continue IV protonix 40 mg BID, for concern of Amy-Daniels tear, given recent frequent vomiting  - As pt recently diagnosed with iritis and anterior uveitis, hemoptysis can be from conditions, such as sarcoidosis, SLE, GPA, Goodpasture's, Behcet's, and other vasculitides that have both ocular and pulmonary manifestations. Will check JULI, anti-dsDNA, C3/C4, c-ANCA, and p-ANCA, PR3   - Rheumatology consult, appreciate recommendations and following   - Consulted Pulmonology, due to CT findings and hemoptysis Improved. Possibly associated with fevers, chills, night sweats, and anterior uveitis. Differential for the hemoptysis includes TB, bronchitis, PNA, PE, GI etiology (e.g., Amy-Daniels tear), and autoimmune conditions (e.g., sarcoidosis, SLE, GPA, Goodpasture's). ESR 98 and CRP 29.7.   - Given presence of fevers, chills, and night sweats along with hemoptysis, will r/o pulmonary TB with AFB sputum smear/culture x3.  Lower suspicion for PE at this time, as pt without any CP, SOB, tachycardia, tachypnea, or hypoxia. HIV non-reactive   - f/u Quant Gold    - Airborne precautions   - CXR clear  - CT of chest w/o contrast significant for right upper lobe mixed solid and groundglass patchy groundglass opacity likely pneumonia. Recommended 1 to 3 month follow-up chest CT to ensure resolution and exclude primary lung neoplasm.  - Continue IV protonix 40 mg BID, for concern of Amy-Daniels tear, given recent frequent vomiting  - As pt recently diagnosed with iritis and anterior uveitis, hemoptysis can be from conditions, such as sarcoidosis, SLE, GPA, Goodpasture's, Behcet's, and other vasculitides that have both ocular and pulmonary manifestations. Will check JULI, anti-dsDNA, C3/C4, c-ANCA, and p-ANCA, PR3   - Rheumatology consult, appreciate recommendations and following   - Consulted Pulmonology, due to CT findings and hemoptysis

## 2019-09-20 NOTE — PROGRESS NOTE ADULT - PROBLEM SELECTOR PLAN 2
Sputum culture, prelim gram + cocci in pairs. Pt presented with WBC 12.06 with fever of 100.4F.  S/p ceftriaxone and azithromycin in the ED. CXR clear. CT of chest concerning for pneumonia.   - Will continued IV Ceftriaxone and IV Azithromycin for empiric treatment for CAP   - TB quant test pending- high risk given occupation- pt on respiratory precaution and negative pressure isolation  - f/u Bcx, Ucx Sputum culture, prelim gram + cocci in pairs. Pt presented with WBC 12.06 with fever of 100.4F.  S/p ceftriaxone and azithromycin in the ED. CXR clear. CT of chest concerning for pneumonia.   - Will continued IV Ceftriaxone and IV Azithromycin for empiric treatment for CAP for 5 days, will D/C tomorrow   - TB quant test pending- high risk given occupation- pt on respiratory precaution and negative pressure isolation  - Septum cultures, negative acid fast thus far   - Bcx no growth, Ucx no growth in 48 hrs. Cr on admission 1.8 vs. 1.11 on 9/12/19, downtrended and now increasing, 1.9. Concern for intra-renal, post-renal KAN, will r/o possible autoimmune disorder/vasculitis.   - Will continue to monitor CMP   - Given KAN with anemia and protein gap, will check UPEP, SPEP, serum immunofixation, serum FLC, and quant immunoglobulins for possible multiple myeloma   - Monitor Cr and UOP  - Avoid NSAIDs, ACEi/ARBs, contrast, nephrotoxic agents. Will hold pt's home losartan.  - f/u Renal U/S  - Bladder scan to r/o urinary retention   - Urine protein 14.7   - Renally dose meds

## 2019-09-20 NOTE — PROGRESS NOTE ADULT - ASSESSMENT
48 yo woman with history of HTN and migraine headaches, recently diagnosed with iritis/anterior uveitis, with FH of sarcoid in daughter, currently sent to ED for hemoptysis, found with KAN, anemia, protein gap, CT scan showing ground glass opacities with hilar/mediastinal LAD, now with +JULI, +DSDNA, + scleroderma antibodies, consistent with possible mixed connective tissue disorder.    #Hemoptysis  - f/u autoimmune work-up: s ofar + JULI, +DsDNA, +scleroderma ABs, negative p-ANCA, negative c-ANCA, negative sjogren, f/u anti-MPO, f/u anti-PR3, f/u anti GBMs, f/u CCP   - agree with: SPEP, UPEP, serum immunofixation to r/o multiple myeloma  - f/u Quant TB, HIV is negative, sputum acid fast negative x1, 2 more pending  - CT chest given hemoptysis showing ground glass opacities, possible superimposed PNA, hilar and mediastinal adenopathy, left breast nodule  - elevated urine protein and creatinine  - low 25 vit D, normal 1,25 vit D, f/u ACE  - pulmonology deferring bronch at this time    #Uveitis  - continue prednisolone drops  - would pursue infectious workup for uveitis    #Renal failure  - worsening, unclear the chronicity of renal disease   - currently in the setting of connective tissue disorder  - agree with renal consult    #anemia  - iron and ferritin WNL 48 yo woman with history of HTN and migraine headaches, recently diagnosed with iritis/anterior uveitis, with FH of sarcoid in daughter, currently sent to ED for hemoptysis, found with KAN, anemia, protein gap, CT scan showing ground glass opacities with hilar/mediastinal LAD, now with +JULI, +DSDNA, + scleroderma antibodies, consistent with possible mixed connective tissue disorder.    #Hemoptysis  - f/u autoimmune work-up: so far +JULI, +DsDNA, +scleroderma ABs, negative p-ANCA, negative c-ANCA, negative sjogren, f/u anti-MPO, f/u anti-PR3, f/u anti GBMs, f/u CCP, f/u IZZY  - agree with: SPEP, UPEP, serum immunofixation to r/o multiple myeloma  - f/u Quant TB, HIV is negative, sputum acid fast negative x1, 2 more pending  - CT chest given hemoptysis showing ground glass opacities, possible superimposed PNA, hilar and mediastinal adenopathy, left breast nodule  - elevated urine protein and creatinine  - low 25 vit D, normal 1,25 vit D, f/u ACE  - pulmonology deferring bronch at this time    #Uveitis  - continue prednisolone drops  - would pursue infectious workup for uveitis    #Renal failure  - worsening, unclear the chronicity of renal disease   - currently in the setting of connective tissue disorder; could raise concern for scleroderma renal crisis  - could also be 2/2 high doses ASA use as patient was taking excedrin x 10 days  - agree with renal consult; may consider biopsy Monday if renal function continues to worsen. Avoiding ARB at this point as this could be AIN 2/2 ASA  - would repeat UA    #anemia  - iron and ferritin WNL  - worsened today, trend hgb daily    Discussed with attending Dr. Bill Acevedo 48 yo woman with history of HTN and migraine headaches, recently diagnosed with iritis/anterior uveitis, with FH of sarcoid in daughter, currently sent to ED for hemoptysis, found with KAN, anemia, protein gap, CT scan showing ground glass opacities with hilar/mediastinal LAD, now with +JULI, +DSDNA, + scleroderma antibodies, consistent with possible mixed connective tissue disorder.  CT findings are not typical for scleroderma and this should not cause hemoptysis, worsening KAN could be SRC, pt is normotensive which can be seen in 10% of population, however at this time, KAN more likely to be 2/2 ASA use.  SLE can cause DAH which leads to hemopytsis, however CT findings are not showing this.  Sarcoidosis is another possible differential vs malignancy.  Etiology of constellation of symptoms remains unclear at this time.    #Hemoptysis  - f/u autoimmune work-up: so far +JULI, +DsDNA, +scleroderma ABs, negative p-ANCA, negative c-ANCA, negative sjogren, f/u anti-MPO, f/u anti-PR3, f/u anti GBMs, f/u CCP, f/u IZZY  - agree with: SPEP, UPEP, serum immunofixation to r/o multiple myeloma  - f/u Quant TB, HIV is negative, sputum acid fast negative x1, 2 more pending  - CT chest given hemoptysis showing ground glass opacities, possible superimposed PNA, hilar and mediastinal adenopathy, left breast nodule  - elevated urine protein and creatinine  - low 25 vit D, normal 1,25 vit D, f/u ACE  - pulmonology deferring bronch at this time    #Uveitis  - continue prednisolone drops  - would pursue infectious workup for uveitis    #Renal failure  - worsening, unclear the chronicity of renal disease   - currently in the setting of connective tissue disorder; could raise concern for scleroderma renal crisis  - could also be 2/2 high doses ASA use as patient was taking excedrin x 10 days  - agree with renal consult; may consider biopsy Monday if renal function continues to worsen. Avoiding ACEi/ARB at this point as this could be ATN/AIN 2/2 ASA  - will hold off on steroids at this time as it can worsen SRC, however lower on differential for KAN at this time.  - would repeat UA    #anemia  - iron and ferritin WNL  - worsened today, trend hgb daily    Discussed with attending Dr. Bill Acevedo

## 2019-09-20 NOTE — PROGRESS NOTE ADULT - ATTENDING COMMENTS
Patient seen and examined by myself , case discussed  with resident ,agree with the above finding and plan  pt clinically feeling better, afebrile ,    pt had very slight blood tinged sputum today ,AFB sputum x1 negative,  will f/u 2nd and 3rd Sputum for AFB, Pulmonary eval and f/u appreciated, recommend conservative treatment with Abx and repeat CT in 3 months, do not recommend bronch od LN bx at this time    c/w IV Abx for Pneumonia, f/u cx   Rheumatology eval appreciated, will f/u w/u for Autoimmune condition , Scleroderma Ab +ve, will f/u rheum recs   Worsening renal fn:  Renal US with normal kidneys, no hydro ,renal eval requested, will f/u recs     Optho eval for Uveitis appreciated, recommendations noted   DVt PPx

## 2019-09-20 NOTE — CONSULT NOTE ADULT - PROBLEM SELECTOR RECOMMENDATION 9
Pt. with ? hemodynamically mediated KAN in the setting of NSAID (ASA) use and ARB use. No labs prior to September 2019 for review. At an ER visit on 9/12/19, Scr was noted to be normal at 1.1. On current admission (9/17/19), Scr was elevated to 1.80, improved the following day to 1.65, however has increased since to 2.09 today. Pt. denies previous history of kidney disease, however admits to taking nearly 2 g of ASA (from Excedrin use) daily from 9/7/19 through 9/17/19. Additionally, pt. on Losartan at home, however not for past several days. UA with bland urine sediment. Renal US on 9/20/19 without hydronephrosis. Serological testing negative/nonreactive for HIV, ANCAs, and C3 and C4 were not low. Pt. positive for JULI, borderline dsDNA, and scleroderma ab. Pt. without clinical evidence of GN. Continue to monitor labs and UOP. If patient's kidney function continues to worsen, may need to consider IR team consult for CT guided kidney biopsy. Avoid potential nephrotoxins Pt. with KAN in the setting of recent NSAIDs and ARB use. On review of previous labs on [facility], Scr was 1.1 on labs doen during ER visit on 9/12/19. On current admission (9/17/19), Scr was elevated to 1.80, improved the following day to 1.65, however has increased since to 2.09 today. Pt. denies previous history of kidney disease, however admits to taking nearly 2 g of ASA (Excedrin) daily from 9/7/19 through 9/17/19. Additionally, pt. was taking on Losartan at home. Losartan held during current hospital stay. UA negative for protein or blood. Renal US on 9/20/19 without hydronephrosis. Serological testing showed negative ANCAs and HIV testing. C3 and C4 were not low. Pt. positive for JULI, borderline dsDNA, and scleroderma ab. Continue to monitor labs and UOP. If patient's kidney function continues to worsen, will consider kidney biopsy. Avoid any potential nephrotoxins

## 2019-09-21 LAB
ALBUMIN SERPL ELPH-MCNC: 3.5 G/DL — SIGNIFICANT CHANGE UP (ref 3.3–5)
ALP SERPL-CCNC: 119 U/L — SIGNIFICANT CHANGE UP (ref 40–120)
ALT FLD-CCNC: 22 U/L — SIGNIFICANT CHANGE UP (ref 4–33)
ANION GAP SERPL CALC-SCNC: 13 MMO/L — SIGNIFICANT CHANGE UP (ref 7–14)
AST SERPL-CCNC: 28 U/L — SIGNIFICANT CHANGE UP (ref 4–32)
BACTERIA UR CULT: SIGNIFICANT CHANGE UP
BASOPHILS # BLD AUTO: 0.08 K/UL — SIGNIFICANT CHANGE UP (ref 0–0.2)
BASOPHILS NFR BLD AUTO: 0.8 % — SIGNIFICANT CHANGE UP (ref 0–2)
BILIRUB SERPL-MCNC: 0.2 MG/DL — SIGNIFICANT CHANGE UP (ref 0.2–1.2)
BUN SERPL-MCNC: 12 MG/DL — SIGNIFICANT CHANGE UP (ref 7–23)
CALCIUM SERPL-MCNC: 8.8 MG/DL — SIGNIFICANT CHANGE UP (ref 8.4–10.5)
CHLORIDE SERPL-SCNC: 100 MMOL/L — SIGNIFICANT CHANGE UP (ref 98–107)
CO2 SERPL-SCNC: 22 MMOL/L — SIGNIFICANT CHANGE UP (ref 22–31)
CREAT SERPL-MCNC: 2.02 MG/DL — HIGH (ref 0.5–1.3)
EOSINOPHIL # BLD AUTO: 0.16 K/UL — SIGNIFICANT CHANGE UP (ref 0–0.5)
EOSINOPHIL NFR BLD AUTO: 1.6 % — SIGNIFICANT CHANGE UP (ref 0–6)
GAMMA INTERFERON BACKGROUND BLD IA-ACNC: 0.08 IU/ML — SIGNIFICANT CHANGE UP
GLUCOSE SERPL-MCNC: 118 MG/DL — HIGH (ref 70–99)
HCT VFR BLD CALC: 29.6 % — LOW (ref 34.5–45)
HGB BLD-MCNC: 9.4 G/DL — LOW (ref 11.5–15.5)
IMM GRANULOCYTES NFR BLD AUTO: 0.3 % — SIGNIFICANT CHANGE UP (ref 0–1.5)
LYMPHOCYTES # BLD AUTO: 1.33 K/UL — SIGNIFICANT CHANGE UP (ref 1–3.3)
LYMPHOCYTES # BLD AUTO: 13.6 % — SIGNIFICANT CHANGE UP (ref 13–44)
M TB IFN-G BLD-IMP: NEGATIVE — SIGNIFICANT CHANGE UP
M TB IFN-G CD4+ BCKGRND COR BLD-ACNC: -0.01 IU/ML — SIGNIFICANT CHANGE UP
M TB IFN-G CD4+CD8+ BCKGRND COR BLD-ACNC: -0.02 IU/ML — SIGNIFICANT CHANGE UP
MAGNESIUM SERPL-MCNC: 2.1 MG/DL — SIGNIFICANT CHANGE UP (ref 1.6–2.6)
MCHC RBC-ENTMCNC: 27.6 PG — SIGNIFICANT CHANGE UP (ref 27–34)
MCHC RBC-ENTMCNC: 31.8 % — LOW (ref 32–36)
MCV RBC AUTO: 86.8 FL — SIGNIFICANT CHANGE UP (ref 80–100)
MONOCYTES # BLD AUTO: 0.88 K/UL — SIGNIFICANT CHANGE UP (ref 0–0.9)
MONOCYTES NFR BLD AUTO: 9 % — SIGNIFICANT CHANGE UP (ref 2–14)
NEUTROPHILS # BLD AUTO: 7.3 K/UL — SIGNIFICANT CHANGE UP (ref 1.8–7.4)
NEUTROPHILS NFR BLD AUTO: 74.7 % — SIGNIFICANT CHANGE UP (ref 43–77)
NRBC # FLD: 0 K/UL — SIGNIFICANT CHANGE UP (ref 0–0)
PHOSPHATE SERPL-MCNC: 4.1 MG/DL — SIGNIFICANT CHANGE UP (ref 2.5–4.5)
PLATELET # BLD AUTO: 292 K/UL — SIGNIFICANT CHANGE UP (ref 150–400)
PMV BLD: 10.5 FL — SIGNIFICANT CHANGE UP (ref 7–13)
POTASSIUM SERPL-MCNC: 3.8 MMOL/L — SIGNIFICANT CHANGE UP (ref 3.5–5.3)
POTASSIUM SERPL-SCNC: 3.8 MMOL/L — SIGNIFICANT CHANGE UP (ref 3.5–5.3)
PROT SERPL-MCNC: 7.8 G/DL — SIGNIFICANT CHANGE UP (ref 6–8.3)
QUANT TB PLUS MITOGEN MINUS NIL: 0.85 IU/ML — SIGNIFICANT CHANGE UP
RBC # BLD: 3.41 M/UL — LOW (ref 3.8–5.2)
RBC # FLD: 14.3 % — SIGNIFICANT CHANGE UP (ref 10.3–14.5)
SODIUM SERPL-SCNC: 135 MMOL/L — SIGNIFICANT CHANGE UP (ref 135–145)
SPECIMEN SOURCE: SIGNIFICANT CHANGE UP
T PALLIDUM AB TITR SER: NEGATIVE — SIGNIFICANT CHANGE UP
WBC # BLD: 9.78 K/UL — SIGNIFICANT CHANGE UP (ref 3.8–10.5)
WBC # FLD AUTO: 9.78 K/UL — SIGNIFICANT CHANGE UP (ref 3.8–10.5)

## 2019-09-21 PROCEDURE — 99233 SBSQ HOSP IP/OBS HIGH 50: CPT | Mod: GC

## 2019-09-21 RX ORDER — SENNA PLUS 8.6 MG/1
2 TABLET ORAL AT BEDTIME
Refills: 0 | Status: DISCONTINUED | OUTPATIENT
Start: 2019-09-21 | End: 2019-09-25

## 2019-09-21 RX ORDER — DOCUSATE SODIUM 100 MG
100 CAPSULE ORAL THREE TIMES A DAY
Refills: 0 | Status: DISCONTINUED | OUTPATIENT
Start: 2019-09-21 | End: 2019-09-25

## 2019-09-21 RX ADMIN — Medication 100 MILLIGRAM(S): at 23:41

## 2019-09-21 RX ADMIN — CYCLOPENTOLATE HYDROCHLORIDE 1 DROP(S): 10 SOLUTION/ DROPS OPHTHALMIC at 06:18

## 2019-09-21 RX ADMIN — Medication 100 MILLIGRAM(S): at 19:12

## 2019-09-21 RX ADMIN — Medication 1 DROP(S): at 06:17

## 2019-09-21 RX ADMIN — Medication 1 MILLIGRAM(S): at 13:17

## 2019-09-21 RX ADMIN — CYCLOPENTOLATE HYDROCHLORIDE 1 DROP(S): 10 SOLUTION/ DROPS OPHTHALMIC at 13:19

## 2019-09-21 RX ADMIN — OXYCODONE HYDROCHLORIDE 5 MILLIGRAM(S): 5 TABLET ORAL at 23:42

## 2019-09-21 RX ADMIN — SENNA PLUS 2 TABLET(S): 8.6 TABLET ORAL at 23:41

## 2019-09-21 RX ADMIN — Medication 650 MILLIGRAM(S): at 19:11

## 2019-09-21 RX ADMIN — OXYCODONE HYDROCHLORIDE 5 MILLIGRAM(S): 5 TABLET ORAL at 00:58

## 2019-09-21 RX ADMIN — AZITHROMYCIN 250 MILLIGRAM(S): 500 TABLET, FILM COATED ORAL at 16:01

## 2019-09-21 RX ADMIN — Medication 650 MILLIGRAM(S): at 06:18

## 2019-09-21 RX ADMIN — OXYCODONE HYDROCHLORIDE 5 MILLIGRAM(S): 5 TABLET ORAL at 13:18

## 2019-09-21 RX ADMIN — Medication 1 DROP(S): at 00:59

## 2019-09-21 RX ADMIN — CEFTRIAXONE 100 MILLIGRAM(S): 500 INJECTION, POWDER, FOR SOLUTION INTRAMUSCULAR; INTRAVENOUS at 13:18

## 2019-09-21 RX ADMIN — ONDANSETRON 4 MILLIGRAM(S): 8 TABLET, FILM COATED ORAL at 17:01

## 2019-09-21 RX ADMIN — Medication 1 DROP(S): at 19:12

## 2019-09-21 RX ADMIN — Medication 100 MILLIGRAM(S): at 06:17

## 2019-09-21 RX ADMIN — OXYCODONE HYDROCHLORIDE 5 MILLIGRAM(S): 5 TABLET ORAL at 14:36

## 2019-09-21 RX ADMIN — Medication 30 MILLILITER(S): at 11:48

## 2019-09-21 RX ADMIN — Medication 400 UNIT(S): at 13:17

## 2019-09-21 RX ADMIN — OXYCODONE HYDROCHLORIDE 5 MILLIGRAM(S): 5 TABLET ORAL at 01:45

## 2019-09-21 RX ADMIN — Medication 1 DROP(S): at 13:18

## 2019-09-21 RX ADMIN — Medication 1 DROP(S): at 23:42

## 2019-09-21 RX ADMIN — CYCLOPENTOLATE HYDROCHLORIDE 1 DROP(S): 10 SOLUTION/ DROPS OPHTHALMIC at 23:41

## 2019-09-21 RX ADMIN — Medication 650 MILLIGRAM(S): at 07:15

## 2019-09-21 NOTE — PROGRESS NOTE ADULT - PROBLEM SELECTOR PLAN 4
Pt reports intermittent nausea, improved with intake. Unclear etiology   - Will order PRN Zofran  - EKG repeated on 9/20/19, QTc: 446 Pt reports intermittent nausea, improved with PO intake. Unclear etiology   - Will continue PRN Zofran  - EKG repeated on 9/20/19, QTc: 446

## 2019-09-21 NOTE — PROGRESS NOTE ADULT - PROBLEM SELECTOR PLAN 1
Improved. Possibly associated with fevers, chills, night sweats, and anterior uveitis. Differential for the hemoptysis includes TB, bronchitis, PNA, PE, GI etiology (e.g., Amy-Daniels tear), and autoimmune conditions (e.g., sarcoidosis, SLE, GPA, Goodpasture's). ESR 98 and CRP 29.7.   - Given presence of fevers, chills, and night sweats along with hemoptysis, will r/o pulmonary TB with AFB sputum smear/culture x3.  Lower suspicion for PE at this time, as pt without any CP, SOB, tachycardia, tachypnea, or hypoxia. HIV non-reactive   - f/u Quant Gold    - Airborne precautions   - CXR clear  - CT of chest w/o contrast significant for right upper lobe mixed solid and groundglass patchy groundglass opacity likely pneumonia. Recommended 1 to 3 month follow-up chest CT to ensure resolution and exclude primary lung neoplasm.  - Continue IV protonix 40 mg BID, for concern of Amy-Daniels tear, given recent frequent vomiting  - As pt recently diagnosed with iritis and anterior uveitis, hemoptysis can be from conditions, such as sarcoidosis, SLE, GPA, Goodpasture's, Behcet's, and other vasculitides that have both ocular and pulmonary manifestations. Will check JULI, anti-dsDNA, C3/C4, c-ANCA, and p-ANCA, PR3   - Rheumatology following, appreciate recommendations and following- sent  - Pulmonology following, will defer bronchoscopy at this time. Improved. Possibly associated with fevers, chills, night sweats, and anterior uveitis. Differential for the hemoptysis includes TB, bronchitis, PNA, PE, GI etiology (e.g., Amy-Daniels tear), and autoimmune conditions (e.g., sarcoidosis, SLE, GPA, Goodpasture's). ESR 98 and CRP 29.7.   - Given presence of fevers, chills, and night sweats along with hemoptysis, will r/o pulmonary TB with AFB sputum smear/culture x3.  Lower suspicion for PE at this time, as pt without any CP, SOB, tachycardia, tachypnea, or hypoxia. HIV non-reactive   - f/u Quant Gold    - Airborne precautions   - CXR clear  - CT of chest w/o contrast significant for right upper lobe mixed solid and groundglass patchy groundglass opacity likely pneumonia. Recommended 1 to 3 month follow-up chest CT to ensure resolution and exclude primary lung neoplasm.  - Continue IV protonix 40 mg BID, for concern of Amy-Daniels tear, given recent frequent vomiting  - As pt recently diagnosed with iritis and anterior uveitis, hemoptysis can be from conditions, such as sarcoidosis, SLE, GPA, Goodpasture's, Behcet's, and other vasculitides that have both ocular and pulmonary manifestations. Will check JULI, anti-dsDNA, C3/C4, c-ANCA, and p-ANCA, PR3   - Rheumatology following, appreciate recommendations and following- DNA III dejon and serum lysosome added   - Pulmonology following, will defer bronchoscopy at this time. Improved. Likely secondary to CAP pneumonia given positive sputum, treating empirically for pneumonia. Due to association with fevers, chills, night sweats, and anterior uveitis, concern for TB or manifestation of autoimmune disorder or underlying malignancy. On admission, ESR 98 and CRP 29.7.   - Given presence of fevers, chills, and night sweats along with hemoptysis, will r/o pulmonary TB with AFB sputum smear/culture x3. HIV non-reactive   - f/u AFB from 9/21, AFB sputum smear/culture negative for AFBx2. If negative, D/C respiratory precautions   - f/u Quant Gold    - Airborne precautions, will D/C if Quant Gold negative or 3rd AFB negative   - CXR clear  - CT of chest w/o contrast significant for right upper lobe mixed solid and groundglass patchy groundglass opacity likely pneumonia. Recommended 1 to 3 month follow-up chest CT to ensure resolution and exclude primary lung neoplasm.  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

## 2019-09-21 NOTE — PROGRESS NOTE ADULT - SUBJECTIVE AND OBJECTIVE BOX
SHERLYN BAZZI  49y  Female    Patient is a 49y old  Female who presents with a chief complaint of Hemoptysis (17 Sep 2019 22:27)    INTERVAL HPI/OVERNIGHT EVENTS: Afebrile overnight. Pt received PRN oxycodone 5mg overnight, acetaminophen.     REVIEW OF SYSTEMS:  Otherwise negative.     Vital Signs Last 24 Hrs  T(C): 36.8 (19 Sep 2019 06:40), Max: 37.3 (18 Sep 2019 20:52)  T(F): 98.2 (19 Sep 2019 06:40), Max: 99.1 (18 Sep 2019 20:52)  HR: 84 (19 Sep 2019 06:40) (80 - 90)  BP: 130/87 (19 Sep 2019 06:40) (130/87 - 146/92)  BP(mean): --  RR: 18 (19 Sep 2019 06:40) (18 - 18)  SpO2: 98% (19 Sep 2019 06:40) (97% - 100%)    PHYSICAL EXAM:  GENERAL: NAD, well-groomed, well-developed  HEAD:  Atraumatic, Normocephalic, no temporal tenderness, no frontal or maxillary sinus tenderness.   EYES: EOMI, conjunctivitis    NECK: Supple, no nuchal rigidity   NERVOUS SYSTEM:  Alert & Oriented X3, Good concentration; Motor Strength  CHEST/LUNG: Breathing comfortably, clear breath sounds bilaterally, No rales, rhonchi, wheezing, or rubs  HEART: Regular rate and rhythm; No murmurs, rubs, or gallops  ABDOMEN: Soft, Nontender, Nondistended; Bowel sounds present  EXTREMITIES:  Tenderness in right medial patella, ROM intact in LE. 2+ Peripheral Pulses, No clubbing, cyanosis, or edema  LYMPH: No cervical, supraclavicular or axillar lymphadenopathy noted.   SKIN: No rashes or lesions    Consultant(s) Notes Reviewed:  [x ] YES  [ ] NO  Care Discussed with Consultants/Other Providers [ x] YES  [ ] NO    LABS:               PTT - ( 17 Sep 2019 15:02 )  PTT:49.0 SEC  Urinalysis Basic - ( 18 Sep 2019 01:52 )    Color: LT. YELLOW / Appearance: CLEAR / S.006 / pH: 7.0  Gluc: NEGATIVE / Ketone: NEGATIVE  / Bili: NEGATIVE / Urobili: NORMAL   Blood: NEGATIVE / Protein: NEGATIVE / Nitrite: NEGATIVE   Leuk Esterase: SMALL / RBC: 0-2 / WBC 6-10   Sq Epi: OCC / Non Sq Epi: x / Bacteria: NEGATIVE      CAPILLARY BLOOD GLUCOSE    Urinalysis Basic - ( 18 Sep 2019 01:52 )    Color: LT. YELLOW / Appearance: CLEAR / S.006 / pH: 7.0  Gluc: NEGATIVE / Ketone: NEGATIVE  / Bili: NEGATIVE / Urobili: NORMAL   Blood: NEGATIVE / Protein: NEGATIVE / Nitrite: NEGATIVE   Leuk Esterase: SMALL / RBC: 0-2 / WBC 6-10   Sq Epi: OCC / Non Sq Epi: x / Bacteria: NEGATIVE    RADIOLOGY & ADDITIONAL TESTS:    Imaging Personally Reviewed:  [x ] YES  [ ] NO      EXAM:  US KIDNEYS AND BLADDER        PROCEDURE DATE:  Sep 20 2019       INTERPRETATION:  CLINICAL INFORMATION: Acute kidney injury.    COMPARISON: None available.    TECHNIQUE: Sonography of the kidneys and bladder.     FINDINGS:    Right kidney: 11.8 cm. No renal mass, hydronephrosis or calculi.    Left kidney:  10.8 cm. No renal mass, hydronephrosis or calculi.    Urinary bladder: Specular debris is present in the bladder.    IMPRESSION:     Sonographically normal kidneys. No hydronephrosis.    Specular debris in the bladder. Correlate with urinalysis.        EXAM:  CT CHEST        PROCEDURE DATE:  Sep 18 2019       INTERPRETATION:  CLINICAL INDICATION: Hemoptysis.    Axial CT images of the chest are obtained without intravenous   administration of contrast.    No prior chest CTs are available for comparison.    Small or benign-appearing bilateral axillary lymph nodes. Nonspecific   mediastinal lymph nodes with the largest in the right paratracheal   location measuring about 9 mm in short axis. Bilateral hilar lymph nodes   with the largest measuring about 1 cm within the right hilum, likely   reactive.    No pericardial effusion. No pleural effusions.    Evaluation of the upper abdominal organs demonstrate a 9 mm right renal   hypodensity likely a cyst. 2 X 1.1 cm nodule within the inferior aspect   of the left breast.    Evaluation of the lungs demonstrate patchy right upper lobe mixed solid   and groundglass opacity. 3 mm left upper lobe peripheral pulmonary nodule   on image 26 of series 2. Two adjacent tiny nodules within the left lower   lobe on image 83 of series 2 measuring up to 4 mm. No central   endobronchial lesions.    Mild upper thoracic spine scoliosis.    IMPRESSION: Right upper lobe mixed solid and groundglass patchy   groundglass opacity likely pneumonia. A 1 to 3 month follow-up chest CT   is recommended to ensure resolution and exclude primary lung neoplasm.    Mediastinal and hilar lymph nodes and 3 tiny left lung pulmonary nodules   can be monitored on the follow-up CT.    2 x 1.1 cm left breast nodule. Correlation with mammogram and/or   ultrasound is recommended for complete evaluation.    RISHI BENNETT M.D. ATTENDING RADIOLOGIST  This document has been electronically signed. Sep 18 2019  1:59PM        HEALTH ISSUES - PROBLEM Dx:  Need for prophylactic measure: Need for prophylactic measure  Hypertension: Hypertension  Anemia: Anemia  KAN (acute kidney injury): KAN (acute kidney injury)  Leukocytosis: Leukocytosis  Uveitis, anterior: Uveitis, anterior  Hemoptysis: Hemoptysis SHERLYN BAZZI  49y  Female    Patient is a 49y old  Female who presents with a chief complaint of Hemoptysis (17 Sep 2019 22:27)    INTERVAL HPI/OVERNIGHT EVENTS: Afebrile overnight. Pt received PRN zofran, oxycodone 5mg, acetaminophen overnight.     REVIEW OF SYSTEMS:  Otherwise negative.     Vital Signs Last 24 Hrs  T(C): 36.8 (19 Sep 2019 06:40), Max: 37.3 (18 Sep 2019 20:52)  T(F): 98.2 (19 Sep 2019 06:40), Max: 99.1 (18 Sep 2019 20:52)  HR: 84 (19 Sep 2019 06:40) (80 - 90)  BP: 130/87 (19 Sep 2019 06:40) (130/87 - 146/92)  BP(mean): --  RR: 18 (19 Sep 2019 06:40) (18 - 18)  SpO2: 98% (19 Sep 2019 06:40) (97% - 100%)    PHYSICAL EXAM:  GENERAL: NAD, well-groomed, well-developed  HEAD:  Atraumatic, Normocephalic, no temporal tenderness, no frontal or maxillary sinus tenderness.   EYES: EOMI, conjunctivitis    NECK: Supple, no nuchal rigidity   NERVOUS SYSTEM:  Alert & Oriented X3, Good concentration; Motor Strength  CHEST/LUNG: Breathing comfortably, clear breath sounds bilaterally, No rales, rhonchi, wheezing, or rubs  HEART: Regular rate and rhythm; No murmurs, rubs, or gallops  ABDOMEN: Soft, Nontender, Nondistended; Bowel sounds present  EXTREMITIES:  Tenderness in right medial patella, ROM intact in LE. 2+ Peripheral Pulses, No clubbing, cyanosis, or edema  LYMPH: No cervical, supraclavicular or axillar lymphadenopathy noted.   SKIN: No rashes or lesions    Consultant(s) Notes Reviewed:  [x ] YES  [ ] NO  Care Discussed with Consultants/Other Providers [ x] YES  [ ] NO    LABS:               PTT - ( 17 Sep 2019 15:02 )  PTT:49.0 SEC  Urinalysis Basic - ( 18 Sep 2019 01:52 )    Color: LT. YELLOW / Appearance: CLEAR / S.006 / pH: 7.0  Gluc: NEGATIVE / Ketone: NEGATIVE  / Bili: NEGATIVE / Urobili: NORMAL   Blood: NEGATIVE / Protein: NEGATIVE / Nitrite: NEGATIVE   Leuk Esterase: SMALL / RBC: 0-2 / WBC 6-10   Sq Epi: OCC / Non Sq Epi: x / Bacteria: NEGATIVE      CAPILLARY BLOOD GLUCOSE    Urinalysis Basic - ( 18 Sep 2019 01:52 )    Color: LT. YELLOW / Appearance: CLEAR / S.006 / pH: 7.0  Gluc: NEGATIVE / Ketone: NEGATIVE  / Bili: NEGATIVE / Urobili: NORMAL   Blood: NEGATIVE / Protein: NEGATIVE / Nitrite: NEGATIVE   Leuk Esterase: SMALL / RBC: 0-2 / WBC 6-10   Sq Epi: OCC / Non Sq Epi: x / Bacteria: NEGATIVE    RADIOLOGY & ADDITIONAL TESTS:    Imaging Personally Reviewed:  [x ] YES  [ ] NO      EXAM:  US KIDNEYS AND BLADDER        PROCEDURE DATE:  Sep 20 2019       INTERPRETATION:  CLINICAL INFORMATION: Acute kidney injury.    COMPARISON: None available.    TECHNIQUE: Sonography of the kidneys and bladder.     FINDINGS:    Right kidney: 11.8 cm. No renal mass, hydronephrosis or calculi.    Left kidney:  10.8 cm. No renal mass, hydronephrosis or calculi.    Urinary bladder: Specular debris is present in the bladder.    IMPRESSION:     Sonographically normal kidneys. No hydronephrosis.    Specular debris in the bladder. Correlate with urinalysis.        EXAM:  CT CHEST        PROCEDURE DATE:  Sep 18 2019       INTERPRETATION:  CLINICAL INDICATION: Hemoptysis.    Axial CT images of the chest are obtained without intravenous   administration of contrast.    No prior chest CTs are available for comparison.    Small or benign-appearing bilateral axillary lymph nodes. Nonspecific   mediastinal lymph nodes with the largest in the right paratracheal   location measuring about 9 mm in short axis. Bilateral hilar lymph nodes   with the largest measuring about 1 cm within the right hilum, likely   reactive.    No pericardial effusion. No pleural effusions.    Evaluation of the upper abdominal organs demonstrate a 9 mm right renal   hypodensity likely a cyst. 2 X 1.1 cm nodule within the inferior aspect   of the left breast.    Evaluation of the lungs demonstrate patchy right upper lobe mixed solid   and groundglass opacity. 3 mm left upper lobe peripheral pulmonary nodule   on image 26 of series 2. Two adjacent tiny nodules within the left lower   lobe on image 83 of series 2 measuring up to 4 mm. No central   endobronchial lesions.    Mild upper thoracic spine scoliosis.    IMPRESSION: Right upper lobe mixed solid and groundglass patchy   groundglass opacity likely pneumonia. A 1 to 3 month follow-up chest CT   is recommended to ensure resolution and exclude primary lung neoplasm.    Mediastinal and hilar lymph nodes and 3 tiny left lung pulmonary nodules   can be monitored on the follow-up CT.    2 x 1.1 cm left breast nodule. Correlation with mammogram and/or   ultrasound is recommended for complete evaluation.    RISHI BENNETT M.D. ATTENDING RADIOLOGIST  This document has been electronically signed. Sep 18 2019  1:59PM        HEALTH ISSUES - PROBLEM Dx:  Need for prophylactic measure: Need for prophylactic measure  Hypertension: Hypertension  Anemia: Anemia  KAN (acute kidney injury): KAN (acute kidney injury)  Leukocytosis: Leukocytosis  Uveitis, anterior: Uveitis, anterior  Hemoptysis: Hemoptysis ROSE MARY SHERLYN  49y  Female    Patient is a 49y old  Female who presents with a chief complaint of Hemoptysis (17 Sep 2019 22:27)    INTERVAL HPI/OVERNIGHT EVENTS: Afebrile overnight. Pt received PRN zofran, oxycodone 5mg, acetaminophen overnight. Patient reports episode of blood tinged septum production this morning. Reports improvement in her nausea.     REVIEW OF SYSTEMS:  Otherwise negative.     Vital Signs Last 24 Hrs  T(C): 36.8 (19 Sep 2019 06:40), Max: 37.3 (18 Sep 2019 20:52)  T(F): 98.2 (19 Sep 2019 06:40), Max: 99.1 (18 Sep 2019 20:52)  HR: 84 (19 Sep 2019 06:40) (80 - 90)  BP: 130/87 (19 Sep 2019 06:40) (130/87 - 146/92)  BP(mean): --  RR: 18 (19 Sep 2019 06:40) (18 - 18)  SpO2: 98% (19 Sep 2019 06:40) (97% - 100%)    PHYSICAL EXAM:  GENERAL: NAD, well-groomed, well-developed  HEAD:  Atraumatic, Normocephalic, no temporal tenderness, no frontal or maxillary sinus tenderness.   EYES: EOMI, conjunctivitis    NECK: Supple, no nuchal rigidity   NERVOUS SYSTEM:  Alert & Oriented X3, Good concentration; Motor Strength  CHEST/LUNG: Breathing comfortably, clear breath sounds bilaterally, No rales, rhonchi, wheezing, or rubs  HEART: Regular rate and rhythm; No murmurs, rubs, or gallops  ABDOMEN: Soft, Nontender, Nondistended; Bowel sounds present  EXTREMITIES:  Tenderness in right medial patella, ROM intact in LE. 2+ Peripheral Pulses, No clubbing, cyanosis, or edema  LYMPH: No cervical, supraclavicular or axillar lymphadenopathy noted.   SKIN: No rashes or lesions    Consultant(s) Notes Reviewed:  [x ] YES  [ ] NO  Care Discussed with Consultants/Other Providers [ x] YES  [ ] NO    LABS:                        9.4    9.78  )-----------( 292      ( 21 Sep 2019 06:10 )             29.6                         9.4    9.78  )-----------( 292      ( 21 Sep 2019 06:10 )             29.6             PTT - ( 17 Sep 2019 15:02 )  PTT:49.0 SEC    Urinalysis Basic - ( 20 Sep 2019 19:44 )    Color: LIGHT YELLOW / Appearance: CLEAR / S.012 / pH: 6.5  Gluc: NEGATIVE / Ketone: NEGATIVE  / Bili: NEGATIVE / Urobili: NORMAL   Blood: NEGATIVE / Protein: 20 / Nitrite: NEGATIVE   Leuk Esterase: TRACE / RBC: 3-5 / WBC 6-10   Sq Epi: OCC / Non Sq Epi: x / Bacteria: NEGATIVE    RADIOLOGY & ADDITIONAL TESTS:    Imaging Personally Reviewed:  [x ] YES  [ ] NO      EXAM:  US KIDNEYS AND BLADDER        PROCEDURE DATE:  Sep 20 2019       INTERPRETATION:  CLINICAL INFORMATION: Acute kidney injury.    COMPARISON: None available.    TECHNIQUE: Sonography of the kidneys and bladder.     FINDINGS:    Right kidney: 11.8 cm. No renal mass, hydronephrosis or calculi.    Left kidney:  10.8 cm. No renal mass, hydronephrosis or calculi.    Urinary bladder: Specular debris is present in the bladder.    IMPRESSION:     Sonographically normal kidneys. No hydronephrosis.    Specular debris in the bladder. Correlate with urinalysis.        EXAM:  CT CHEST        PROCEDURE DATE:  Sep 18 2019       INTERPRETATION:  CLINICAL INDICATION: Hemoptysis.    Axial CT images of the chest are obtained without intravenous   administration of contrast.    No prior chest CTs are available for comparison.    Small or benign-appearing bilateral axillary lymph nodes. Nonspecific   mediastinal lymph nodes with the largest in the right paratracheal   location measuring about 9 mm in short axis. Bilateral hilar lymph nodes   with the largest measuring about 1 cm within the right hilum, likely   reactive.    No pericardial effusion. No pleural effusions.    Evaluation of the upper abdominal organs demonstrate a 9 mm right renal   hypodensity likely a cyst. 2 X 1.1 cm nodule within the inferior aspect   of the left breast.    Evaluation of the lungs demonstrate patchy right upper lobe mixed solid   and groundglass opacity. 3 mm left upper lobe peripheral pulmonary nodule   on image 26 of series 2. Two adjacent tiny nodules within the left lower   lobe on image 83 of series 2 measuring up to 4 mm. No central   endobronchial lesions.    Mild upper thoracic spine scoliosis.    IMPRESSION: Right upper lobe mixed solid and groundglass patchy   groundglass opacity likely pneumonia. A 1 to 3 month follow-up chest CT   is recommended to ensure resolution and exclude primary lung neoplasm.    Mediastinal and hilar lymph nodes and 3 tiny left lung pulmonary nodules   can be monitored on the follow-up CT.    2 x 1.1 cm left breast nodule. Correlation with mammogram and/or   ultrasound is recommended for complete evaluation.    RISHI BENNETT M.D. ATTENDING RADIOLOGIST  This document has been electronically signed. Sep 18 2019  1:59PM        HEALTH ISSUES - PROBLEM Dx:  Need for prophylactic measure: Need for prophylactic measure  Hypertension: Hypertension  Anemia: Anemia  KAN (acute kidney injury): KAN (acute kidney injury)  Leukocytosis: Leukocytosis  Uveitis, anterior: Uveitis, anterior  Hemoptysis: Hemoptysis EMILIA BAZZITANYA  49y  Female    Patient is a 49y old  Female who presents with a chief complaint of Hemoptysis (17 Sep 2019 22:27)    INTERVAL HPI/OVERNIGHT EVENTS: Afebrile overnight. Pt received PRN zofran, oxycodone 5mg, acetaminophen overnight. Patient reports episode of blood tinged septum production this morning. Reports improvement in her nausea.     REVIEW OF SYSTEMS:  Otherwise negative.     Vital Signs Last 24 Hrs  Vital Signs Last 24 Hrs  T(C): 37.1 (21 Sep 2019 15:55), Max: 37.2 (20 Sep 2019 21:22)  T(F): 98.8 (21 Sep 2019 15:55), Max: 98.9 (20 Sep 2019 21:22)  HR: 95 (21 Sep 2019 15:55) (83 - 95)  BP: 138/85 (21 Sep 2019 15:55) (138/85 - 140/83)  BP(mean): --  RR: 17 (21 Sep 2019 15:55) (15 - 17)  SpO2: 96% (21 Sep 2019 15:55) (96% - 100%)    PHYSICAL EXAM:  GENERAL: NAD, well-groomed, well-developed  HEAD:  Atraumatic, Normocephalic, no temporal tenderness, no frontal or maxillary sinus tenderness.   EYES: EOMI, conjunctivitis    NECK: Supple, no nuchal rigidity   NERVOUS SYSTEM:  Alert & Oriented X3, Good concentration; Motor Strength  CHEST/LUNG: Breathing comfortably, clear breath sounds bilaterally, No rales, rhonchi, wheezing, or rubs  HEART: Regular rate and rhythm; No murmurs, rubs, or gallops  ABDOMEN: Soft, Nontender, Nondistended; Bowel sounds present  EXTREMITIES:  Tenderness in right medial patella, ROM intact in LE. 2+ Peripheral Pulses, No clubbing, cyanosis, or edema  LYMPH: No cervical, supraclavicular or axillar lymphadenopathy noted.   SKIN: No rashes or lesions    Consultant(s) Notes Reviewed:  [x ] YES  [ ] NO  Care Discussed with Consultants/Other Providers [ x] YES  [ ] NO    LABS:                        9.4    9.78  )-----------( 292      ( 21 Sep 2019 06:10 )             29.6       09-21    135  |  100  |  12  ----------------------------<  118<H>  3.8   |  22  |  2.02<H>    Ca    8.8      21 Sep 2019 06:10  Phos  4.1       Mg     2.1         TPro  7.8  /  Alb  3.5  /  TBili  0.2  /  DBili  x   /  AST  28  /  ALT  22  /  AlkPhos  119        Urinalysis Basic - ( 20 Sep 2019 19:44 )    Color: LIGHT YELLOW / Appearance: CLEAR / S.012 / pH: 6.5  Gluc: NEGATIVE / Ketone: NEGATIVE  / Bili: NEGATIVE / Urobili: NORMAL   Blood: NEGATIVE / Protein: 20 / Nitrite: NEGATIVE   Leuk Esterase: TRACE / RBC: 3-5 / WBC 6-10   Sq Epi: OCC / Non Sq Epi: x / Bacteria: NEGATIVE    RADIOLOGY & ADDITIONAL TESTS:    Imaging Personally Reviewed:  [x ] YES  [ ] NO      EXAM:  US KIDNEYS AND BLADDER        PROCEDURE DATE:  Sep 20 2019       INTERPRETATION:  CLINICAL INFORMATION: Acute kidney injury.    COMPARISON: None available.    TECHNIQUE: Sonography of the kidneys and bladder.     FINDINGS:    Right kidney: 11.8 cm. No renal mass, hydronephrosis or calculi.    Left kidney:  10.8 cm. No renal mass, hydronephrosis or calculi.    Urinary bladder: Specular debris is present in the bladder.    IMPRESSION:     Sonographically normal kidneys. No hydronephrosis.    Specular debris in the bladder. Correlate with urinalysis.        EXAM:  CT CHEST        PROCEDURE DATE:  Sep 18 2019       INTERPRETATION:  CLINICAL INDICATION: Hemoptysis.    Axial CT images of the chest are obtained without intravenous   administration of contrast.    No prior chest CTs are available for comparison.    Small or benign-appearing bilateral axillary lymph nodes. Nonspecific   mediastinal lymph nodes with the largest in the right paratracheal   location measuring about 9 mm in short axis. Bilateral hilar lymph nodes   with the largest measuring about 1 cm within the right hilum, likely   reactive.    No pericardial effusion. No pleural effusions.    Evaluation of the upper abdominal organs demonstrate a 9 mm right renal   hypodensity likely a cyst. 2 X 1.1 cm nodule within the inferior aspect   of the left breast.    Evaluation of the lungs demonstrate patchy right upper lobe mixed solid   and groundglass opacity. 3 mm left upper lobe peripheral pulmonary nodule   on image 26 of series 2. Two adjacent tiny nodules within the left lower   lobe on image 83 of series 2 measuring up to 4 mm. No central   endobronchial lesions.    Mild upper thoracic spine scoliosis.    IMPRESSION: Right upper lobe mixed solid and groundglass patchy   groundglass opacity likely pneumonia. A 1 to 3 month follow-up chest CT   is recommended to ensure resolution and exclude primary lung neoplasm.    Mediastinal and hilar lymph nodes and 3 tiny left lung pulmonary nodules   can be monitored on the follow-up CT.    2 x 1.1 cm left breast nodule. Correlation with mammogram and/or   ultrasound is recommended for complete evaluation.    RISHI BENNETT M.D. ATTENDING RADIOLOGIST  This document has been electronically signed. Sep 18 2019  1:59PM        HEALTH ISSUES - PROBLEM Dx:  Need for prophylactic measure: Need for prophylactic measure  Hypertension: Hypertension  Anemia: Anemia  KAN (acute kidney injury): KAN (acute kidney injury)  Leukocytosis: Leukocytosis  Uveitis, anterior: Uveitis, anterior  Hemoptysis: Hemoptysis EMILIA BAZZITANYA  49y  Female    Patient is a 49y old  Female who presents with a chief complaint of Hemoptysis (17 Sep 2019 22:27)    INTERVAL HPI/OVERNIGHT EVENTS: Afebrile overnight. Pt received PRN zofran, oxycodone 5mg, acetaminophen overnight. Patient reports episode of blood tinged septum production this morning. Reports improvement in her nausea. Reports only one episode of chills overnight. Reports improvement in her HA and eye discomfort. Denies SOB, chest pain, LE edema, burning with urination, increased urinary frequency.     REVIEW OF SYSTEMS:  Otherwise negative.     Vital Signs Last 24 Hrs  T(C): 37.1 (21 Sep 2019 15:55), Max: 37.2 (20 Sep 2019 21:22)  T(F): 98.8 (21 Sep 2019 15:55), Max: 98.9 (20 Sep 2019 21:22)  HR: 95 (21 Sep 2019 15:55) (83 - 95)  BP: 138/85 (21 Sep 2019 15:55) (138/85 - 140/83)  BP(mean): --  RR: 17 (21 Sep 2019 15:55) (15 - 17)  SpO2: 96% (21 Sep 2019 15:55) (96% - 100%)    PHYSICAL EXAM:  GENERAL: NAD, well-groomed, well-developed  HEAD:  Atraumatic, Normocephalic, no temporal tenderness, no frontal or maxillary sinus tenderness.   EYES: EOMI, conjunctivitis    NECK: Supple, no nuchal rigidity   NERVOUS SYSTEM:  Alert & Oriented X3, Good concentration; Motor Strength  CHEST/LUNG: Breathing comfortably, clear breath sounds bilaterally, No rales, rhonchi, wheezing, or rubs  HEART: Regular rate and rhythm; No murmurs, rubs, or gallops  ABDOMEN: Soft, Nontender, Nondistended; Bowel sounds present  EXTREMITIES:  Tenderness in right medial patella, ROM intact in LE. 2+ Peripheral Pulses, No clubbing, cyanosis, or edema  LYMPH: No cervical, supraclavicular or axillar lymphadenopathy noted.   SKIN: No rashes or lesions    Consultant(s) Notes Reviewed:  [x ] YES  [ ] NO  Care Discussed with Consultants/Other Providers [ x] YES  [ ] NO    LABS:                        9.4    9.78  )-----------( 292      ( 21 Sep 2019 06:10 )             29.6           135  |  100  |  12  ----------------------------<  118<H>  3.8   |  22  |  2.02<H>    Ca    8.8      21 Sep 2019 06:10  Phos  4.1       Mg     2.1         TPro  7.8  /  Alb  3.5  /  TBili  0.2  /  DBili  x   /  AST  28  /  ALT  22  /  AlkPhos  119        Urinalysis Basic - ( 20 Sep 2019 19:44 )    Color: LIGHT YELLOW / Appearance: CLEAR / S.012 / pH: 6.5  Gluc: NEGATIVE / Ketone: NEGATIVE  / Bili: NEGATIVE / Urobili: NORMAL   Blood: NEGATIVE / Protein: 20 / Nitrite: NEGATIVE   Leuk Esterase: TRACE / RBC: 3-5 / WBC 6-10   Sq Epi: OCC / Non Sq Epi: x / Bacteria: NEGATIVE    RADIOLOGY & ADDITIONAL TESTS:    Imaging Personally Reviewed:  [x ] YES  [ ] NO      EXAM:  US KIDNEYS AND BLADDER        PROCEDURE DATE:  Sep 20 2019       INTERPRETATION:  CLINICAL INFORMATION: Acute kidney injury.    COMPARISON: None available.    TECHNIQUE: Sonography of the kidneys and bladder.     FINDINGS:    Right kidney: 11.8 cm. No renal mass, hydronephrosis or calculi.    Left kidney:  10.8 cm. No renal mass, hydronephrosis or calculi.    Urinary bladder: Specular debris is present in the bladder.    IMPRESSION:     Sonographically normal kidneys. No hydronephrosis.    Specular debris in the bladder. Correlate with urinalysis.        EXAM:  CT CHEST        PROCEDURE DATE:  Sep 18 2019       INTERPRETATION:  CLINICAL INDICATION: Hemoptysis.    Axial CT images of the chest are obtained without intravenous   administration of contrast.    No prior chest CTs are available for comparison.    Small or benign-appearing bilateral axillary lymph nodes. Nonspecific   mediastinal lymph nodes with the largest in the right paratracheal   location measuring about 9 mm in short axis. Bilateral hilar lymph nodes   with the largest measuring about 1 cm within the right hilum, likely   reactive.    No pericardial effusion. No pleural effusions.    Evaluation of the upper abdominal organs demonstrate a 9 mm right renal   hypodensity likely a cyst. 2 X 1.1 cm nodule within the inferior aspect   of the left breast.    Evaluation of the lungs demonstrate patchy right upper lobe mixed solid   and groundglass opacity. 3 mm left upper lobe peripheral pulmonary nodule   on image 26 of series 2. Two adjacent tiny nodules within the left lower   lobe on image 83 of series 2 measuring up to 4 mm. No central   endobronchial lesions.    Mild upper thoracic spine scoliosis.    IMPRESSION: Right upper lobe mixed solid and groundglass patchy   groundglass opacity likely pneumonia. A 1 to 3 month follow-up chest CT   is recommended to ensure resolution and exclude primary lung neoplasm.    Mediastinal and hilar lymph nodes and 3 tiny left lung pulmonary nodules   can be monitored on the follow-up CT.    2 x 1.1 cm left breast nodule. Correlation with mammogram and/or   ultrasound is recommended for complete evaluation.    RISHI BENNETT M.D. ATTENDING RADIOLOGIST  This document has been electronically signed. Sep 18 2019  1:59PM        HEALTH ISSUES - PROBLEM Dx:  Need for prophylactic measure: Need for prophylactic measure  Hypertension: Hypertension  Anemia: Anemia  KAN (acute kidney injury): KAN (acute kidney injury)  Leukocytosis: Leukocytosis  Uveitis, anterior: Uveitis, anterior  Hemoptysis: Hemoptysis

## 2019-09-21 NOTE — PROGRESS NOTE ADULT - ASSESSMENT
50 yo  female, domiciled with family, employed by [employer], with history of HTN and migraine headaches (well controlled for past 2 years) presents with new onset of hemoptysis associated with N/V, fever and leukocytosis and recent diagnosis of iritis and anterior uveitis, concerning for pneumonia complicated by potential underlying autoimmune disorder. 50 yo  female, domiciled with family, employed by [employer], with history of HTN and migraine headaches (well controlled for past 2 years) presents with new onset of hemoptysis associated with N/V, fever and leukocytosis and recent diagnosis of iritis and anterior uveitis, concerning for pneumonia complicated by potential underlying autoimmune disorder and KAN. Patient received empiric treatment of CAP pneumonia. Hemoptysis and nausea improving, worsening kidney function that has stabilized. Autoimmune work up concerning for mixed connective tissue disorder.

## 2019-09-21 NOTE — PROGRESS NOTE ADULT - PROBLEM SELECTOR PLAN 2
Cr on admission 1.8 vs. 1.11 on 9/12/19, downtrended and now increasing, 1.9. Concern for intra-renal, post-renal KAN, will r/o possible autoimmune disorder/vasculitis.   - Will continue to monitor CMP   - Given KAN with anemia and protein gap, will check UPEP, SPEP, serum immunofixation, serum FLC, and quant immunoglobulins for possible multiple myeloma   - Monitor Cr and UOP  - Avoid NSAIDs, ACEi/ARBs, contrast, nephrotoxic agents. Will hold pt's home losartan.  - f/u Renal U/S  - Bladder scan to r/o urinary retention   - Urine protein 14.7   - Renally dose meds Cr on admission 1.8 vs. 1.11 on 9/12/19, downtrended and now increasing, 1.9. Concern for intra-renal, post-renal KAN, will r/o possible autoimmune disorder/vasculitis.   - Nephrology consult, for worsening KAN, appreciate recommendation    - Will continue to monitor CMP   - Given KAN with anemia and protein gap, will check UPEP, SPEP, serum immunofixation, serum FLC, and quant immunoglobulins for possible multiple myeloma   - Monitor Cr and UOP  - Avoid NSAIDs, ACEi/ARBs, contrast, nephrotoxic agents. Will hold pt's home losartan.  - f/u Renal U/S  - Bladder scan to r/o urinary retention   - Urine protein 14.7   - Renally dose meds Cr on admission 1.8 vs. 1.11 on 9/12/19, downtrended and now increasing, stable today at 2.02 Concern for intra-renal, post-renal KAN, will r/o possible autoimmune disorder/vasculitis.   - Nephrology consult, for worsening KAN, appreciate recommendation    - Will continue to monitor CMP   - Given KAN with anemia and protein gap, will check UPEP, SPEP, serum immunofixation, serum FLC, and quant immunoglobulins for possible multiple myeloma   - Monitor Cr and UOP  - Avoid NSAIDs, ACEi/ARBs, contrast, nephrotoxic agents. Will hold pt's home losartan.  - f/u Renal U/S  - Bladder scan to r/o urinary retention   - Urine protein 14.7   - Renally dose meds Cr on admission 1.8 vs. 1.11 on 9/12/19, downtrended and now increasing, stable today at 2.02 Concern for intra-renal, post-renal KAN, will r/o possible autoimmune disorder/vasculitis. Patient recently using increased doses of ASA at home for HA. Renal U/S non-remarkable. Bladder scan not indicative of urinary retention.   - Nephrology consult, for worsening KAN, appreciate recommendation, will consider renal biopsy if renal function continues to decline     - Will continue to monitor CMP   - Monitor Cr and UOP  - Avoid NSAIDs, ACEi/ARBs, contrast, nephrotoxic agents. Will continue to hold pt's home losartan.  - Renally dose meds

## 2019-09-21 NOTE — PROGRESS NOTE ADULT - ATTENDING COMMENTS
Patient seen and examined by myself , case discussed  with resident ,agree with the above finding and plan  pt clinically feeling better, afebrile ,    pt still with slight blood tinged sputum today ,AFB sputum x2 negative,  will f/u  3rd Sputum for AFB, Pulmonary eval and f/u appreciated, recommend conservative treatment with Abx and repeat CT in 3 months, do not recommend bronch or LN bx at this time    c/w IV Abx for Pneumonia 5/5 day today , f/u cx   Rheumatology eval appreciated, will f/u w/u for Autoimmune condition , Scleroderma Ab +ve, ds DNA  elevated , will f/u the pending autoimmune W/U   Worsening renal fn:  Renal US with normal kidneys, no hydro ,renal eval requested, Nephrology recs noted, if renal fn not improving, may renal bx     Optho eval for Uveitis appreciated, recommendations noted   case d/w pt in detailed, all questions answered   DVt PPx

## 2019-09-21 NOTE — PROGRESS NOTE ADULT - PROBLEM SELECTOR PLAN 5
Pt with diagnosis of iritis and anterior uveitis by ophthalmology on Tuesday and prescribed Prednisolone eye drops.  - C/w Prednisolone 1% eye drops qid (prescription confirmed via ophthalmology office note in [facility])  - Ordered cyclopentolate as per optho recs   - Opthalmology, following, appreciate recommendations   - As anterior uveitis associated with autoimmune conditions, such as sarcoidosis, SLE, GPA, Goodpasture's, Behcet's, other vasculitides, IBD, and rheumatoid arthritis. - C3/C4, c-ANCA, p-ANCA, RF negative so far   - Rheumatology consult, appreciate recs   - Pain control with Tylenol PRN for moderate pain and Oxycodone IR 5 mg q6hrs PRN for severe pain Pt with diagnosis of iritis and anterior uveitis by ophthalmology on 9/17 and prescribed Prednisolone eye drops.  - Continue with Prednisolone 1% eye drops qid (prescription confirmed via ophthalmology office note in [facility])  - Continued with cyclopentolate as per optho recs   - Opthalmology following, appreciate recommendations   - Positive seromarkers for SLE and Sjogren's, concern for mixed connective tissue disorder. C3/C4, c-ANCA, p-ANCA, RF negative so far   - Rheumatology following, appreciate recs   - Pain control with Tylenol PRN for moderate pain and Oxycodone IR 5 mg q6hrs PRN for severe pain Pt with diagnosis of iritis and anterior uveitis by ophthalmology on 9/17 and prescribed Prednisolone eye drops.  - Continue with Prednisolone 1% eye drops qid (prescription confirmed via ophthalmology office note in [facility])  - Continued with cyclopentolate as per optho recs   - Opthalmology following, appreciate recommendations   - Positive seromarkers for SLE and Sjogren's (positive JULI and weakly positive dsDNA and scleroderma antibodies), concern for mixed connective tissue disorder. C3/C4, c-ANCA, p-ANCA, RF negative so far   - Rheumatology following, appreciate recs   - Pain control with Tylenol PRN for moderate pain and Oxycodone IR 5 mg q6hrs PRN for severe pain

## 2019-09-21 NOTE — PROGRESS NOTE ADULT - PROBLEM SELECTOR PLAN 1
Pt. with KAN in the setting of recent NSAIDs and ARB use. On review of previous labs on [facility]/[place], Scr was 1.1 on labs done during ER visit on 9/12/19. On current admission (9/17/19), Scr was elevated to 1.80, improved next day to 1.65, however increased to 2.09 yesterday. Scr today is stable at 2.02. Pt. denies previous history of kidney disease, however admits to taking nearly 2 g of ASA (Excedrin) daily from 9/7/19 through 9/17/19. Additionally, pt. was taking on Losartan at home. Losartan held during current hospital stay. UA negative for protein or blood. Renal US on 9/20/19 without hydronephrosis. Serological testing showed negative ANCAs and HIV testing. C3 and C4 were not low. Pt. positive for JULI, borderline dsDNA, and scleroderma ab. Continue to monitor labs and UOP. If patient's kidney function worsens, will consider kidney biopsy. Avoid any potential nephrotoxins Pt. with KAN in the setting of recent NSAIDs and ARB use. On review of previous labs on [facility]/[place], Scr was 1.1 on labs done during ER visit on 9/12/19. On current admission (9/17/19), Scr was elevated to 1.80, improved next day to 1.65, however increased to 2.09 yesterday. Scr stable at 2.02 today. Pt. denies previous history of kidney disease, however admits to taking nearly 2 g of ASA (Excedrin) daily from 9/7/19 through 9/17/19. Additionally, pt. was taking on Losartan at home. Losartan held during current hospital stay. UA negative for protein or blood. Renal US on 9/20/19 without hydronephrosis. Serological testing showed negative ANCAs and HIV testing. C3 and C4 were not low. Pt. positive for JULI, borderline dsDNA, and scleroderma ab. Continue to monitor labs and UOP. If patient's kidney function worsens, will consider kidney biopsy. Avoid any potential nephrotoxins

## 2019-09-21 NOTE — PROGRESS NOTE ADULT - PROBLEM SELECTOR PLAN 3
Sputum culture, prelim gram + cocci in pairs. Pt presented with WBC 12.06 with fever of 100.4F.  S/p ceftriaxone and azithromycin in the ED. CXR clear. CT of chest concerning for pneumonia.   - Will continued IV Ceftriaxone and IV Azithromycin for empiric treatment for CAP for 5 days, will D/C tomorrow   - Pending TB quant test- high risk given occupation- pt on respiratory precaution and negative pressure isolation  - Septum cultures, negative acid fast thus far x1   - Bcx no growth, Ucx no growth in 48 hrs. Sputum culture, prelim gram + cocci in pairs. Pt presented with WBC 12.06 with fever of 100.4F.  S/p ceftriaxone and azithromycin in the ED. CXR clear. CT of chest concerning for pneumonia.   - Will continued IV Ceftriaxone and IV Azithromycin for empiric treatment for CAP for 5 days, will D/C tomorrow   - Pending TB quant test- high risk given occupation- pt on respiratory precaution and negative pressure isolation  - Septum cultures, negative acid fast thus far x1   - Bcx no growth, Ucx no growth in 72 hrs. Sputum culture, prelim gram + cocci in pairs. Pt presented with WBC 12.06 with fever of 100.4F.  S/p ceftriaxone and azithromycin in the ED. CXR clear. CT of chest concerning for pneumonia.   - Will continued IV Ceftriaxone and IV Azithromycin for empiric treatment for CAP for 5 days, will D/C after doses today.   - Pending TB quant test- high risk given occupation- pt on respiratory precaution and negative pressure isolation  - Septum cultures, negative acid fast thus far x1   - Bcx no growth, Ucx no growth in 72 hrs. Sputum culture, gram + cocci in pairs. Pt presented with WBC 12.06 with fever of 100.4F.  S/p ceftriaxone and azithromycin in the ED. CXR clear. CT of chest concerning for pneumonia.   - Pt received IV Ceftriaxone and IV Azithromycin for empiric treatment for CAP for 5 day course, will D/C after doses today.   - Pending TB quant test- high risk given occupation- pt on respiratory precaution and negative pressure isolation  - Septum cultures, negative acid fast thus far x1   - BCx no growth in 72 hrs. UCx no growth 24 hrs

## 2019-09-21 NOTE — PROGRESS NOTE ADULT - PROBLEM SELECTOR PLAN 8
Incidental left breast 2 x 1.1 cm nodule found on CT chest, r/o malignancy. Per pt, she had a biopsy 2 years ago, follows with outpatient obgyn    - Recommend outpatient follow up for mammography and ultrasound

## 2019-09-21 NOTE — PROGRESS NOTE ADULT - SUBJECTIVE AND OBJECTIVE BOX
[facility] DIVISION OF KIDNEY DISEASES AND HYPERTENSION -- FOLLOW UP NOTE  --------------------------------------------------------------------------------  HPI: 50 yo F with HTN and migraine headaches is admitted with hemoptysis and elevated Scr. Pt. is currently being worked up for infectious and rheumatologic causes for hemoptysis. Nephrology team is following for elevated Scr. Pt. denies history of kidney disease. Pt. states that she was taking Excedrin (containing 250 mg of ASA per pill) x2 pills every 6 hours for migraines for 10 days prior to hospitalization Pt. takes Losartan at home, but has lost refills and has not taken it for several days. No labs prior to September 2019 for review in [facility]/[place]. Pt. had visited the ER on 9/12/19 and was noted to have a normal Scr of 1.1. On current admission (9/17/19) pt. found to have an elevated Scr of 1.80, which improved to 1.65 on 9/18/19, before uptrending to 2.09 yesterday. Scr is stable today at 2.02. UA negative for blood and protein. Renal US on 9/20/19 with no masses, calculi, or hydronephrosis. Serologic testing positive for JULI, borderline ds-DNA, and scleroderma ab.    Pt. seen and examined at bedside this AM. Pt. states that she feels well. Had one episode of coughing with some blood-tinged sputum. Denies dysuria. Denies CP, SOB, N/V/F/C.    PAST HISTORY  --------------------------------------------------------------------------------  No significant changes to PMH, PSH, FHx, SHx, unless otherwise noted    ALLERGIES & MEDICATIONS  --------------------------------------------------------------------------------  Allergies    No Known Allergies    Intolerances    Standing Inpatient Medications  azithromycin  IVPB 500 milliGRAM(s) IV Intermittent every 24 hours  cefTRIAXone   IVPB 1000 milliGRAM(s) IV Intermittent every 24 hours  cholecalciferol 400 Unit(s) Oral daily  cyclopentolate 0.5% Solution 1 Drop(s) Both EYES three times a day  folic acid 1 milliGRAM(s) Oral daily  influenza   Vaccine 0.5 milliLiter(s) IntraMuscular once  labetalol 100 milliGRAM(s) Oral two times a day  prednisoLONE acetate 1% Suspension 1 Drop(s) Both EYES four times a day    REVIEW OF SYSTEMS  --------------------------------------------------------------------------------  Gen: No lethargy  Respiratory: No dyspnea  CV: No chest pain  GI: No abdominal pain  MSK: No LE edema  Neuro: No dizziness  Heme: No bleeding    All other systems were reviewed and are negative, except as noted.    VITALS/PHYSICAL EXAM  --------------------------------------------------------------------------------  T(C): 36.7 (09-21-19 @ 06:16), Max: 37.2 (09-20-19 @ 21:22)  HR: 83 (09-21-19 @ 06:16) (78 - 86)  BP: 138/85 (09-21-19 @ 06:16) (123/72 - 140/83)  RR: 15 (09-21-19 @ 06:16) (15 - 17)  SpO2: 100% (09-21-19 @ 06:16) (100% - 100%)  Wt(kg): --    Physical Exam:  	Gen: NAD, well-appearing  	HEENT: Supple neck   	Pulm: CTA B/L  	CV: RRR, S1S2; no rub  	Abd: +BS, soft, nontender/nondistended  	: No suprapubic tenderness  	LE: No edema b/l  	Skin: Warm, without rashes  	Psych: Normal affect    LABS/STUDIES  --------------------------------------------------------------------------------              9.4    9.78  >-----------<  292      [09-21-19 @ 06:10]              29.6     135  |  100  |  12  ----------------------------<  118      [09-21-19 @ 06:10]  3.8   |  22  |  2.02        Ca     8.8     [09-21-19 @ 06:10]      Mg     2.1     [09-21-19 @ 06:10]      Phos  4.1     [09-21-19 @ 06:10]    Creatinine Trend:  SCr 2.02 [09-21 @ 06:10]  SCr 2.09 [09-20 @ 07:30]  SCr 1.91 [09-19 @ 07:15]  SCr 1.65 [09-18 @ 07:22]  SCr 1.80 [09-17 @ 15:02]

## 2019-09-21 NOTE — PROGRESS NOTE ADULT - PROBLEM SELECTOR PLAN 6
Stable. Hgb 10.2 on admission in setting of hemoptysis vs. 10.1 on 9/12/19. Per pt, has h/o anemia and has been on folic acid supplements as a result.   - Monitor H/H and transfuse pRBCs to keep Hgb > 7  - C/w folic acid 1 mg daily  - Type and screen ordered   - Folate B12 wnl, iron, TIBC, ferritin wnl   - Multiple myeloma workup as noted in plan for KAN, elevated Lambda/Kappa protein Stable. Hgb 10.2 on admission in setting of hemoptysis. Per pt, has h/o anemia and has been on folic acid supplements as a result.   - Monitor H/H and transfuse pRBCs to keep Hgb > 7  - C/w folic acid 1 mg daily  - Type and screen ordered   - Folate B12 wnl, iron, TIBC, ferritin wnl   -   - Multiple myeloma workup as noted in plan for KAN, elevated Lambda/Kappa protein Stable. Hgb 10.2 on admission in setting of hemoptysis. Per pt, has h/o anemia and has been on folic acid supplements as a result.  Elevated Haptoglobin  - Monitor H/H and transfuse pRBCs to keep Hgb > 7  - C/w folic acid 1 mg daily  - Folate B12 wnl, iron, TIBC, ferritin wnl

## 2019-09-22 LAB
ALBUMIN SERPL ELPH-MCNC: 3.6 G/DL — SIGNIFICANT CHANGE UP (ref 3.3–5)
ALP SERPL-CCNC: 122 U/L — HIGH (ref 40–120)
ALT FLD-CCNC: 33 U/L — SIGNIFICANT CHANGE UP (ref 4–33)
ANION GAP SERPL CALC-SCNC: 14 MMO/L — SIGNIFICANT CHANGE UP (ref 7–14)
AST SERPL-CCNC: 46 U/L — HIGH (ref 4–32)
BACTERIA BLD CULT: SIGNIFICANT CHANGE UP
BACTERIA BLD CULT: SIGNIFICANT CHANGE UP
BILIRUB SERPL-MCNC: 0.2 MG/DL — SIGNIFICANT CHANGE UP (ref 0.2–1.2)
BUN SERPL-MCNC: 12 MG/DL — SIGNIFICANT CHANGE UP (ref 7–23)
CALCIUM SERPL-MCNC: 9.1 MG/DL — SIGNIFICANT CHANGE UP (ref 8.4–10.5)
CHLORIDE SERPL-SCNC: 99 MMOL/L — SIGNIFICANT CHANGE UP (ref 98–107)
CO2 SERPL-SCNC: 23 MMOL/L — SIGNIFICANT CHANGE UP (ref 22–31)
CREAT SERPL-MCNC: 2.06 MG/DL — HIGH (ref 0.5–1.3)
CULTURE - ACID FAST SMEAR CONCENTRATED: SIGNIFICANT CHANGE UP
GLUCOSE SERPL-MCNC: 119 MG/DL — HIGH (ref 70–99)
HCT VFR BLD CALC: 30.1 % — LOW (ref 34.5–45)
HGB BLD-MCNC: 9.5 G/DL — LOW (ref 11.5–15.5)
MAGNESIUM SERPL-MCNC: 2.3 MG/DL — SIGNIFICANT CHANGE UP (ref 1.6–2.6)
MCHC RBC-ENTMCNC: 26.8 PG — LOW (ref 27–34)
MCHC RBC-ENTMCNC: 31.6 % — LOW (ref 32–36)
MCV RBC AUTO: 84.8 FL — SIGNIFICANT CHANGE UP (ref 80–100)
NRBC # FLD: 0 K/UL — SIGNIFICANT CHANGE UP (ref 0–0)
PHOSPHATE SERPL-MCNC: 4 MG/DL — SIGNIFICANT CHANGE UP (ref 2.5–4.5)
PLATELET # BLD AUTO: 320 K/UL — SIGNIFICANT CHANGE UP (ref 150–400)
PMV BLD: 10 FL — SIGNIFICANT CHANGE UP (ref 7–13)
POTASSIUM SERPL-MCNC: 4.1 MMOL/L — SIGNIFICANT CHANGE UP (ref 3.5–5.3)
POTASSIUM SERPL-SCNC: 4.1 MMOL/L — SIGNIFICANT CHANGE UP (ref 3.5–5.3)
PROT SERPL-MCNC: 8.1 G/DL — SIGNIFICANT CHANGE UP (ref 6–8.3)
RBC # BLD: 3.55 M/UL — LOW (ref 3.8–5.2)
RBC # FLD: 14 % — SIGNIFICANT CHANGE UP (ref 10.3–14.5)
SODIUM SERPL-SCNC: 136 MMOL/L — SIGNIFICANT CHANGE UP (ref 135–145)
SPECIMEN SOURCE: SIGNIFICANT CHANGE UP
WBC # BLD: 8.85 K/UL — SIGNIFICANT CHANGE UP (ref 3.8–10.5)
WBC # FLD AUTO: 8.85 K/UL — SIGNIFICANT CHANGE UP (ref 3.8–10.5)

## 2019-09-22 PROCEDURE — 99232 SBSQ HOSP IP/OBS MODERATE 35: CPT | Mod: GC

## 2019-09-22 PROCEDURE — 99233 SBSQ HOSP IP/OBS HIGH 50: CPT | Mod: GC

## 2019-09-22 RX ADMIN — OXYCODONE HYDROCHLORIDE 5 MILLIGRAM(S): 5 TABLET ORAL at 06:50

## 2019-09-22 RX ADMIN — Medication 400 UNIT(S): at 11:59

## 2019-09-22 RX ADMIN — Medication 100 MILLIGRAM(S): at 18:46

## 2019-09-22 RX ADMIN — CYCLOPENTOLATE HYDROCHLORIDE 1 DROP(S): 10 SOLUTION/ DROPS OPHTHALMIC at 18:27

## 2019-09-22 RX ADMIN — OXYCODONE HYDROCHLORIDE 5 MILLIGRAM(S): 5 TABLET ORAL at 13:01

## 2019-09-22 RX ADMIN — OXYCODONE HYDROCHLORIDE 5 MILLIGRAM(S): 5 TABLET ORAL at 00:40

## 2019-09-22 RX ADMIN — Medication 100 MILLIGRAM(S): at 15:04

## 2019-09-22 RX ADMIN — Medication 1 DROP(S): at 18:46

## 2019-09-22 RX ADMIN — CYCLOPENTOLATE HYDROCHLORIDE 1 DROP(S): 10 SOLUTION/ DROPS OPHTHALMIC at 05:53

## 2019-09-22 RX ADMIN — Medication 650 MILLIGRAM(S): at 23:04

## 2019-09-22 RX ADMIN — OXYCODONE HYDROCHLORIDE 5 MILLIGRAM(S): 5 TABLET ORAL at 18:46

## 2019-09-22 RX ADMIN — Medication 650 MILLIGRAM(S): at 03:13

## 2019-09-22 RX ADMIN — CYCLOPENTOLATE HYDROCHLORIDE 1 DROP(S): 10 SOLUTION/ DROPS OPHTHALMIC at 23:05

## 2019-09-22 RX ADMIN — Medication 100 MILLIGRAM(S): at 05:53

## 2019-09-22 RX ADMIN — Medication 1 MILLIGRAM(S): at 12:00

## 2019-09-22 RX ADMIN — OXYCODONE HYDROCHLORIDE 5 MILLIGRAM(S): 5 TABLET ORAL at 05:53

## 2019-09-22 RX ADMIN — ONDANSETRON 4 MILLIGRAM(S): 8 TABLET, FILM COATED ORAL at 23:04

## 2019-09-22 RX ADMIN — Medication 650 MILLIGRAM(S): at 16:04

## 2019-09-22 RX ADMIN — Medication 1 DROP(S): at 05:53

## 2019-09-22 RX ADMIN — OXYCODONE HYDROCHLORIDE 5 MILLIGRAM(S): 5 TABLET ORAL at 12:01

## 2019-09-22 RX ADMIN — Medication 650 MILLIGRAM(S): at 04:10

## 2019-09-22 RX ADMIN — Medication 650 MILLIGRAM(S): at 15:04

## 2019-09-22 RX ADMIN — Medication 1 DROP(S): at 12:00

## 2019-09-22 RX ADMIN — OXYCODONE HYDROCHLORIDE 5 MILLIGRAM(S): 5 TABLET ORAL at 19:46

## 2019-09-22 RX ADMIN — Medication 1 DROP(S): at 23:05

## 2019-09-22 NOTE — PROGRESS NOTE ADULT - PROBLEM SELECTOR PLAN 2
Cr on admission 1.8 vs. 1.11 on 9/12/19, downtrended and now increasing, stable today at 2.02 Concern for intra-renal, post-renal KAN, will r/o possible autoimmune disorder/vasculitis. Patient recently using increased doses of ASA at home for HA. Renal U/S non-remarkable. Bladder scan not indicative of urinary retention.   - Nephrology consult, for worsening KAN, appreciate recommendation, will consider renal biopsy if renal function continues to decline     - Will continue to monitor CMP   - Monitor Cr and UOP  - Avoid NSAIDs, ACEi/ARBs, contrast, nephrotoxic agents. Will continue to hold pt's home losartan.  - Renally dose meds

## 2019-09-22 NOTE — PROGRESS NOTE ADULT - PROBLEM SELECTOR PLAN 5
Pt with diagnosis of iritis and anterior uveitis by ophthalmology on 9/17 and prescribed Prednisolone eye drops.  - Continue with Prednisolone 1% eye drops qid (prescription confirmed via ophthalmology office note in [facility])  - Continued with cyclopentolate as per optho recs   - Opthalmology following, appreciate recommendations   - Positive seromarkers for SLE and Sjogren's (positive JULI and weakly positive dsDNA and scleroderma antibodies), concern for mixed connective tissue disorder. C3/C4, c-ANCA, p-ANCA, RF negative so far   - Rheumatology following, appreciate recs   - Pain control with Tylenol PRN for moderate pain and Oxycodone IR 5 mg q6hrs PRN for severe pain

## 2019-09-22 NOTE — PROGRESS NOTE ADULT - SUBJECTIVE AND OBJECTIVE BOX
[facility] DIVISION OF KIDNEY DISEASES AND HYPERTENSION -- FOLLOW UP NOTE  --------------------------------------------------------------------------------  HPI: 50 yo F with HTN and migraine headaches is admitted with hemoptysis and elevated Scr. Pt. is currently being worked up for infectious and rheumatologic causes for hemoptysis. Nephrology team is following for elevated Scr. Pt. denies history of kidney disease. Pt. states that she was taking Excedrin (containing 250 mg of ASA per pill) x2 pills every 6 hours for migraines for 10 days prior to hospitalization Pt. takes Losartan at home, but has lost refills and has not taken it for several days. No labs prior to September 2019 for review in [facility]. Pt. had visited the ER on 9/12/19 and was noted to have a normal Scr of 1.1. On current admission (9/17/19) pt. found to have an elevated Scr of 1.80, which improved to 1.65 on 9/18/19, before uptrending to 2.09 yesterday. Scr is stable today at 2.06. UA negative for blood and protein. Renal US on 9/20/19 with no masses, calculi, or hydronephrosis. Serologic testing positive for JULI, borderline ds-DNA, and scleroderma ab.    Pt. seen and examined at bedside this AM. Pt. states that she feels well. Denies dysuria. Denies CP, SOB, N/V/F/C.    PAST HISTORY  --------------------------------------------------------------------------------  No significant changes to PMH, PSH, FHx, SHx, unless otherwise noted    ALLERGIES & MEDICATIONS  --------------------------------------------------------------------------------  Allergies    No Known Allergies    Intolerances    Standing Inpatient Medications  cholecalciferol 400 Unit(s) Oral daily  cyclopentolate 0.5% Solution 1 Drop(s) Both EYES three times a day  docusate sodium 100 milliGRAM(s) Oral three times a day  folic acid 1 milliGRAM(s) Oral daily  influenza   Vaccine 0.5 milliLiter(s) IntraMuscular once  labetalol 100 milliGRAM(s) Oral two times a day  prednisoLONE acetate 1% Suspension 1 Drop(s) Both EYES four times a day  senna 2 Tablet(s) Oral at bedtime    REVIEW OF SYSTEMS  --------------------------------------------------------------------------------  Gen: No lethargy  Respiratory: No dyspnea  CV: No chest pain  GI: No abdominal pain  MSK: No LE edema  Neuro: No dizziness  Heme: No bleeding    All other systems were reviewed and are negative, except as noted.    VITALS/PHYSICAL EXAM  --------------------------------------------------------------------------------  T(C): 36.9 (09-22-19 @ 05:50), Max: 37.1 (09-21-19 @ 15:55)  HR: 76 (09-22-19 @ 05:50) (76 - 95)  BP: 11/84 (09-22-19 @ 05:50) (11/84 - 146/93)  RR: 17 (09-22-19 @ 05:50) (16 - 17)  SpO2: 100% (09-22-19 @ 05:50) (96% - 100%)  Wt(kg): --    Physical Exam:  	Gen: NAD, well-appearing  	HEENT: Supple neck   	Pulm: CTA B/L  	CV: RRR, S1S2; no rub  	Abd: +BS, soft, nontender/nondistended  	: No suprapubic tenderness  	LE: No edema b/l  	Skin: Warm, without rashes  	Psych: Normal affect    LABS/STUDIES  --------------------------------------------------------------------------------              9.5    8.85  >-----------<  320      [09-22-19 @ 07:00]              30.1     136  |  99  |  12  ----------------------------<  119      [09-22-19 @ 07:00]  4.1   |  23  |  2.06        Ca     9.1     [09-22-19 @ 07:00]      Mg     2.3     [09-22-19 @ 07:00]      Phos  4.0     [09-22-19 @ 07:00]    Creatinine Trend:  SCr 2.06 [09-22 @ 07:00]  SCr 2.02 [09-21 @ 06:10]  SCr 2.09 [09-20 @ 07:30]  SCr 1.91 [09-19 @ 07:15]  SCr 1.65 [09-18 @ 07:22] [facility] [place] DIVISION OF KIDNEY DISEASES AND HYPERTENSION -- FOLLOW UP NOTE  --------------------------------------------------------------------------------  HPI: 48 yo F with HTN and migraine headaches is admitted with hemoptysis and elevated Scr. Pt. is currently being worked up for infectious and rheumatologic causes for hemoptysis. Nephrology team is following for elevated Scr. Pt. denies history of kidney disease. Pt. states that she was taking Excedrin (containing 250 mg of ASA per pill) x2 pills every 6 hours for migraines for 10 days prior to hospitalization Pt. takes Losartan at home, but has lost refills and has not taken it for several days. No labs prior to September 2019 for review in [facility]/[place]. Pt. had visited the ER on 9/12/19 and was noted to have a normal Scr of 1.1. On current admission (9/17/19) pt. found to have an elevated Scr of 1.80, which improved to 1.65 on 9/18/19, before uptrending to 2.09 yesterday. Scr elevated/stable today at 2.06. UA negative for blood and protein. Renal US on 9/20/19 with no masses, calculi, or hydronephrosis. Serologic testing positive for JULI, borderline ds-DNA, and scleroderma ab.    Pt. seen and examined at bedside this AM. Pt. states that she feels well. Denies dysuria. Denies CP, SOB, N/V/F/C.    PAST HISTORY  --------------------------------------------------------------------------------  No significant changes to PMH, PSH, FHx, SHx, unless otherwise noted    ALLERGIES & MEDICATIONS  --------------------------------------------------------------------------------  Allergies    No Known Allergies    Intolerances    Standing Inpatient Medications  cholecalciferol 400 Unit(s) Oral daily  cyclopentolate 0.5% Solution 1 Drop(s) Both EYES three times a day  docusate sodium 100 milliGRAM(s) Oral three times a day  folic acid 1 milliGRAM(s) Oral daily  influenza   Vaccine 0.5 milliLiter(s) IntraMuscular once  labetalol 100 milliGRAM(s) Oral two times a day  prednisoLONE acetate 1% Suspension 1 Drop(s) Both EYES four times a day  senna 2 Tablet(s) Oral at bedtime    REVIEW OF SYSTEMS  --------------------------------------------------------------------------------  Gen: No lethargy  Respiratory: No dyspnea  CV: No chest pain  GI: No abdominal pain  MSK: No LE edema  Neuro: No dizziness  Heme: No bleeding    All other systems were reviewed and are negative, except as noted.    VITALS/PHYSICAL EXAM  --------------------------------------------------------------------------------  T(C): 36.9 (09-22-19 @ 05:50), Max: 37.1 (09-21-19 @ 15:55)  HR: 76 (09-22-19 @ 05:50) (76 - 95)  BP: 11/84 (09-22-19 @ 05:50) (11/84 - 146/93)  RR: 17 (09-22-19 @ 05:50) (16 - 17)  SpO2: 100% (09-22-19 @ 05:50) (96% - 100%)  Wt(kg): --    Physical Exam:  	Gen: NAD, well-appearing  	HEENT: Supple neck   	Pulm: CTA B/L  	CV: RRR, S1S2; no rub  	Abd: +BS, soft, nontender/nondistended  	: No suprapubic tenderness  	LE: No edema b/l  	Skin: Warm, without rashes  	Psych: Normal affect    LABS/STUDIES  --------------------------------------------------------------------------------              9.5    8.85  >-----------<  320      [09-22-19 @ 07:00]              30.1     136  |  99  |  12  ----------------------------<  119      [09-22-19 @ 07:00]  4.1   |  23  |  2.06        Ca     9.1     [09-22-19 @ 07:00]      Mg     2.3     [09-22-19 @ 07:00]      Phos  4.0     [09-22-19 @ 07:00]    Creatinine Trend:  SCr 2.06 [09-22 @ 07:00]  SCr 2.02 [09-21 @ 06:10]  SCr 2.09 [09-20 @ 07:30]  SCr 1.91 [09-19 @ 07:15]  SCr 1.65 [09-18 @ 07:22]

## 2019-09-22 NOTE — PROGRESS NOTE ADULT - ATTENDING COMMENTS
Patient seen and examined by myself , case discussed  with resident ,agree with the above finding and plan  pt clinically feeling better, afebrile ,    pt still with slight blood tinged sputum ,AFB sputum x3 negative,  off isolation   -Pulmonary eval and f/u appreciated, recommend conservative treatment with Abx and repeat CT in 3 months, do not recommend bronch or LN bx at this time   completed IV Abx for Pneumonia 5/5 day   Rheumatology eval appreciated, will f/u w/u for Autoimmune condition , Scleroderma Ab +ve, ds DNA  elevated , will f/u the pending autoimmune W/U   Worsening renal fn:  Renal US with normal kidneys, no hydro ,renal eval requested, Nephrology recs noted, if renal fn not improving, may need  renal bx     Optho eval for Uveitis appreciated, recommendations noted   case d/w pt in detailed, all questions answered   DVt PPx

## 2019-09-22 NOTE — PROGRESS NOTE ADULT - PROBLEM SELECTOR PLAN 1
Improved. Likely secondary to CAP pneumonia, s/p IV abx x 5 days  Sputum cx strep pnuemo  ESR 98 and CRP 29.7.   - Given presence of fevers, chills, and night sweats along with hemoptysis, AFB sputum smear/culture x3. HIV non-reactive   -Quant gold negative   - CXR clear  - CT of chest w/o contrast significant for right upper lobe mixed solid and groundglass patchy groundglass opacity likely pneumonia. Recommended 1 to 3 month follow-up chest CT to ensure resolution and exclude primary lung neoplasm.  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

## 2019-09-22 NOTE — PROGRESS NOTE ADULT - SUBJECTIVE AND OBJECTIVE BOX
Patient is a 49y old  Female who presents with a chief complaint of Hemoptysis (22 Sep 2019 08:41)      SUBJECTIVE / OVERNIGHT EVENTS: No overnight events. Patient reports last hemoptysis episode was yesterday. No complaints this AM. Patient denies CP, SOB. She does endorse intermittent nausea associated with abx, which has now resolved.     MEDICATIONS  (STANDING):  cholecalciferol 400 Unit(s) Oral daily  cyclopentolate 0.5% Solution 1 Drop(s) Both EYES three times a day  docusate sodium 100 milliGRAM(s) Oral three times a day  folic acid 1 milliGRAM(s) Oral daily  influenza   Vaccine 0.5 milliLiter(s) IntraMuscular once  labetalol 100 milliGRAM(s) Oral two times a day  prednisoLONE acetate 1% Suspension 1 Drop(s) Both EYES four times a day  senna 2 Tablet(s) Oral at bedtime    MEDICATIONS  (PRN):  acetaminophen   Tablet .. 650 milliGRAM(s) Oral every 6 hours PRN Moderate Pain (4 - 6)  aluminum hydroxide/magnesium hydroxide/simethicone Suspension 30 milliLiter(s) Oral every 6 hours PRN Dyspepsia  ondansetron    Tablet 4 milliGRAM(s) Oral every 6 hours PRN Nausea and/or Vomiting  oxyCODONE    IR 5 milliGRAM(s) Oral every 6 hours PRN Severe Pain (7 - 10)      T(C): 36.9 (19 @ 05:50), Max: 37.1 (19 @ 15:55)  HR: 76 (19 @ 05:50) (76 - 95)  BP:  (19 @ 05:50) ( - 146/93)  RR: 17 (19 @ 05:50) (16 - 17)  SpO2: 100% (19 @ 05:50) (96% - 100%)    PHYSICAL EXAM  GENERAL: NAD, well-developed  NEURO: AO x3  HEAD:  Atraumatic, Normocephalic  EYES: conjunctiva and sclera clear  NECK: Supple  CHEST/LUNG: Clear to auscultation bilaterally; No wheezes, rales or rhonchi  HEART: Regular rate and rhythm; No murmurs, rubs, or gallops  ABDOMEN: Soft, Nontender, Nondistended; Bowel sounds present, no masses.  EXTREMITIES:  2+ Peripheral Pulses, No clubbing, cyanosis, or edema  SKIN: Warm, dry, in tact, no rashes or lesions  PSYCH: affect appropriate    LABS:                        9.5    8.85  )-----------( 320      ( 22 Sep 2019 07:00 )             30.1         136  |  99  |  12  ----------------------------<  119<H>  4.1   |  23  |  2.06<H>    Ca    9.1      22 Sep 2019 07:00  Phos  4.0       Mg     2.3         TPro  8.1  /  Alb  3.6  /  TBili  0.2  /  DBili  x   /  AST  46<H>  /  ALT  33  /  AlkPhos  122<H>            Urinalysis Basic - ( 20 Sep 2019 19:44 )    Color: LIGHT YELLOW / Appearance: CLEAR / S.012 / pH: 6.5  Gluc: NEGATIVE / Ketone: NEGATIVE  / Bili: NEGATIVE / Urobili: NORMAL   Blood: NEGATIVE / Protein: 20 / Nitrite: NEGATIVE   Leuk Esterase: TRACE / RBC: 3-5 / WBC 6-10   Sq Epi: OCC / Non Sq Epi: x / Bacteria: NEGATIVE      I&O's Summary

## 2019-09-22 NOTE — PROGRESS NOTE ADULT - ASSESSMENT
48 yo  female, domiciled with family, employed by [employer], with history of HTN and migraine headaches (well controlled for past 2 years) presents with new onset of hemoptysis associated with N/V, fever and leukocytosis and recent diagnosis of iritis and anterior uveitis, concerning for pneumonia complicated by potential underlying autoimmune disorder and KAN s/p 5 days abx for step pneum CAP

## 2019-09-22 NOTE — PROGRESS NOTE ADULT - PROBLEM SELECTOR PLAN 4
Pt reports intermittent nausea, improved with PO intake. Unclear etiology   - Will continue PRN Zofran  - EKG repeated on 9/20/19, QTc: 446, no evidence of electrical alternas

## 2019-09-22 NOTE — PROGRESS NOTE ADULT - PROBLEM SELECTOR PLAN 3
Sputum culture, gram + cocci in pairs.   +SEPSIS, now resolved  CT of chest concerning for pneumonia RUL  - Pt received IV Ceftriaxone and IV Azithromycin for empiric treatment for CAP for 5 day course

## 2019-09-22 NOTE — PROGRESS NOTE ADULT - PROBLEM SELECTOR PLAN 6
Stable. Hgb 10.2 on admission in setting of hemoptysis. Per pt, has h/o anemia and has been on folic acid supplements as a result.  Elevated Haptoglobin  - Monitor H/H and transfuse pRBCs to keep Hgb > 7  - C/w folic acid 1 mg daily  - Folate B12 wnl, iron, TIBC, ferritin wnl

## 2019-09-22 NOTE — PROGRESS NOTE ADULT - PROBLEM SELECTOR PLAN 1
Pt. with KAN in the setting of recent NSAIDs and ARB use. On review of previous labs on [facility]/[place], Scr was 1.1 on labs done during ER visit on 9/12/19. On current admission (9/17/19), Scr was elevated to 1.80, improved next day to 1.65, however increased to 2.09 on 9/20/19. Scr stable at 2.06 today. Pt. denies previous history of kidney disease, however admits to taking nearly 2 g of ASA (Excedrin) daily from 9/7/19 through 9/17/19. Additionally, pt. was taking on Losartan at home. Losartan held during current hospital stay. UA negative for protein or blood. Renal US on 9/20/19 without hydronephrosis. Serological testing showed negative ANCAs and HIV testing. C3 and C4 were not low. Pt. positive for JULI, borderline dsDNA, and scleroderma ab. Continue to monitor labs and UOP. If patient's kidney function worsens, will consider kidney biopsy. Avoid any potential nephrotoxins

## 2019-09-23 ENCOUNTER — TRANSCRIPTION ENCOUNTER (OUTPATIENT)
Age: 49
End: 2019-09-23

## 2019-09-23 LAB
ALBUMIN SERPL ELPH-MCNC: 3.5 G/DL — SIGNIFICANT CHANGE UP (ref 3.3–5)
ALP SERPL-CCNC: 112 U/L — SIGNIFICANT CHANGE UP (ref 40–120)
ALT FLD-CCNC: 34 U/L — HIGH (ref 4–33)
ANION GAP SERPL CALC-SCNC: 13 MMO/L — SIGNIFICANT CHANGE UP (ref 7–14)
AST SERPL-CCNC: 40 U/L — HIGH (ref 4–32)
BACTERIA UR CULT: SIGNIFICANT CHANGE UP
BILIRUB SERPL-MCNC: 0.3 MG/DL — SIGNIFICANT CHANGE UP (ref 0.2–1.2)
BUN SERPL-MCNC: 12 MG/DL — SIGNIFICANT CHANGE UP (ref 7–23)
CALCIUM SERPL-MCNC: 8.9 MG/DL — SIGNIFICANT CHANGE UP (ref 8.4–10.5)
CHLORIDE SERPL-SCNC: 98 MMOL/L — SIGNIFICANT CHANGE UP (ref 98–107)
CO2 SERPL-SCNC: 23 MMOL/L — SIGNIFICANT CHANGE UP (ref 22–31)
CREAT SERPL-MCNC: 2.06 MG/DL — HIGH (ref 0.5–1.3)
ENA RNP IGG SER-ACNC: 0.3 — SIGNIFICANT CHANGE UP
ENA SM AB TITR SER: < 0.2 — SIGNIFICANT CHANGE UP
GBM IGG SER-ACNC: <1 — SIGNIFICANT CHANGE UP
GLUCOSE SERPL-MCNC: 124 MG/DL — HIGH (ref 70–99)
HLA-B27 QL: SIGNIFICANT CHANGE UP
POTASSIUM SERPL-MCNC: 4 MMOL/L — SIGNIFICANT CHANGE UP (ref 3.5–5.3)
POTASSIUM SERPL-SCNC: 4 MMOL/L — SIGNIFICANT CHANGE UP (ref 3.5–5.3)
PROT SERPL-MCNC: 7.5 G/DL — SIGNIFICANT CHANGE UP (ref 6–8.3)
SODIUM SERPL-SCNC: 134 MMOL/L — LOW (ref 135–145)
SPECIMEN SOURCE: SIGNIFICANT CHANGE UP

## 2019-09-23 PROCEDURE — 99233 SBSQ HOSP IP/OBS HIGH 50: CPT | Mod: GC

## 2019-09-23 PROCEDURE — 99233 SBSQ HOSP IP/OBS HIGH 50: CPT

## 2019-09-23 PROCEDURE — 99232 SBSQ HOSP IP/OBS MODERATE 35: CPT | Mod: GC

## 2019-09-23 RX ORDER — PANTOPRAZOLE SODIUM 20 MG/1
40 TABLET, DELAYED RELEASE ORAL AT BEDTIME
Refills: 0 | Status: DISCONTINUED | OUTPATIENT
Start: 2019-09-23 | End: 2019-09-25

## 2019-09-23 RX ADMIN — OXYCODONE HYDROCHLORIDE 5 MILLIGRAM(S): 5 TABLET ORAL at 05:57

## 2019-09-23 RX ADMIN — Medication 650 MILLIGRAM(S): at 14:16

## 2019-09-23 RX ADMIN — Medication 650 MILLIGRAM(S): at 23:09

## 2019-09-23 RX ADMIN — ONDANSETRON 4 MILLIGRAM(S): 8 TABLET, FILM COATED ORAL at 14:05

## 2019-09-23 RX ADMIN — Medication 1 DROP(S): at 05:56

## 2019-09-23 RX ADMIN — Medication 650 MILLIGRAM(S): at 00:34

## 2019-09-23 RX ADMIN — CYCLOPENTOLATE HYDROCHLORIDE 1 DROP(S): 10 SOLUTION/ DROPS OPHTHALMIC at 05:57

## 2019-09-23 RX ADMIN — Medication 100 MILLIGRAM(S): at 19:06

## 2019-09-23 RX ADMIN — Medication 650 MILLIGRAM(S): at 14:56

## 2019-09-23 RX ADMIN — Medication 1 DROP(S): at 23:10

## 2019-09-23 RX ADMIN — PANTOPRAZOLE SODIUM 40 MILLIGRAM(S): 20 TABLET, DELAYED RELEASE ORAL at 23:14

## 2019-09-23 RX ADMIN — OXYCODONE HYDROCHLORIDE 5 MILLIGRAM(S): 5 TABLET ORAL at 06:27

## 2019-09-23 RX ADMIN — SENNA PLUS 2 TABLET(S): 8.6 TABLET ORAL at 23:09

## 2019-09-23 RX ADMIN — Medication 30 MILLILITER(S): at 19:07

## 2019-09-23 RX ADMIN — Medication 400 UNIT(S): at 14:01

## 2019-09-23 RX ADMIN — Medication 1 MILLIGRAM(S): at 14:01

## 2019-09-23 RX ADMIN — Medication 650 MILLIGRAM(S): at 23:50

## 2019-09-23 RX ADMIN — Medication 100 MILLIGRAM(S): at 05:58

## 2019-09-23 RX ADMIN — Medication 100 MILLIGRAM(S): at 05:56

## 2019-09-23 RX ADMIN — OXYCODONE HYDROCHLORIDE 5 MILLIGRAM(S): 5 TABLET ORAL at 19:06

## 2019-09-23 RX ADMIN — Medication 650 MILLIGRAM(S): at 20:27

## 2019-09-23 RX ADMIN — CYCLOPENTOLATE HYDROCHLORIDE 1 DROP(S): 10 SOLUTION/ DROPS OPHTHALMIC at 14:02

## 2019-09-23 RX ADMIN — OXYCODONE HYDROCHLORIDE 5 MILLIGRAM(S): 5 TABLET ORAL at 20:28

## 2019-09-23 RX ADMIN — Medication 100 MILLIGRAM(S): at 23:09

## 2019-09-23 RX ADMIN — CYCLOPENTOLATE HYDROCHLORIDE 1 DROP(S): 10 SOLUTION/ DROPS OPHTHALMIC at 23:10

## 2019-09-23 RX ADMIN — Medication 1 DROP(S): at 19:06

## 2019-09-23 RX ADMIN — Medication 1 DROP(S): at 14:02

## 2019-09-23 NOTE — DISCHARGE NOTE NURSING/CASE MANAGEMENT/SOCIAL WORK - NSDCPNINST_GEN_ALL_CORE
Ms Hannah is stable for discharge.  She is alert and oriented x 4, is self sufficient with her care.  She is able to Shower, nausea and vomiting are resolved; no c/o pain to her Eyes, Eye Drops continued.  Vital Signs are stable ; she did eat and tolerated her diet well, also had a bowel movement also.  Discharge Instructions are discussed and given to her. Ms Hannah is stable for discharge.  She is alert and oriented x 4, is self sufficient with her care.  She is able to Shower, nausea  persists and Zofran is given as needed and vomiting is resolved; no c/o pain to her Eyes, Eye Drops continued as ordered.  Vital Signs are stable ; she did eat and tolerated her diet well, also had a bowel movement also.  Discharge Instructions are discussed and given to her.

## 2019-09-23 NOTE — PROGRESS NOTE ADULT - SUBJECTIVE AND OBJECTIVE BOX
Eduardo Suárez MD PGY1   Pager: [phone] [place], [phone] [facility]  After 7: Night Float pager    Patient is a 49y old  Female who presents with a chief complaint of Hemoptysis (23 Sep 2019 09:04)      SUBJECTIVE / OVERNIGHT EVENTS: Overnight, no acute events. PAtient seen and examined at bedside this AM, denying acute complaints.     MEDICATIONS  (STANDING):  cholecalciferol 400 Unit(s) Oral daily  cyclopentolate 0.5% Solution 1 Drop(s) Both EYES three times a day  docusate sodium 100 milliGRAM(s) Oral three times a day  folic acid 1 milliGRAM(s) Oral daily  influenza   Vaccine 0.5 milliLiter(s) IntraMuscular once  labetalol 100 milliGRAM(s) Oral two times a day  prednisoLONE acetate 1% Suspension 1 Drop(s) Both EYES four times a day  senna 2 Tablet(s) Oral at bedtime    MEDICATIONS  (PRN):  acetaminophen   Tablet .. 650 milliGRAM(s) Oral every 6 hours PRN Moderate Pain (4 - 6)  aluminum hydroxide/magnesium hydroxide/simethicone Suspension 30 milliLiter(s) Oral every 6 hours PRN Dyspepsia  ondansetron    Tablet 4 milliGRAM(s) Oral every 6 hours PRN Nausea and/or Vomiting  oxyCODONE    IR 5 milliGRAM(s) Oral every 6 hours PRN Severe Pain (7 - 10)      Vital Signs Last 24 Hrs  T(C): 36.7 (23 Sep 2019 05:57), Max: 37.1 (22 Sep 2019 15:32)  T(F): 98 (23 Sep 2019 05:57), Max: 98.8 (22 Sep 2019 15:32)  HR: 80 (23 Sep 2019 05:57) (80 - 87)  BP: 135/82 (23 Sep 2019 05:57) (132/71 - 144/85)  BP(mean): --  RR: 17 (23 Sep 2019 05:57) (17 - 17)  SpO2: 97% (23 Sep 2019 05:57) (96% - 100%)  CAPILLARY BLOOD GLUCOSE        I&O's Summary      PHYSICAL EXAM:  GENERAL: NAD, well-developed  EYES: EOMI, PERRLA, conjunctiva and sclera clear  NECK:  No JVD  CHEST/LUNG: CTABL ; No wheeze  HEART: RRR; No murmurs  ABDOMEN: Soft, Nontender, Nondistended; Bowel sounds present  : No Barrett  EXTREMITIES:  2+ Peripheral Pulses, No edema  PSYCH: AAOx3  NEUROLOGY: non-focal  SKIN: No rashes or lesions. No sacral ulcer    LABS:                        9.5    8.85  )-----------( 320      ( 22 Sep 2019 07:00 )             30.1     09-23    134<L>  |  98  |  12  ----------------------------<  124<H>  4.0   |  23  |  2.06<H>    Ca    8.9      23 Sep 2019 07:35  Phos  4.0     09-22  Mg     2.3     09-22    TPro  7.5  /  Alb  3.5  /  TBili  0.3  /  DBili  x   /  AST  40<H>  /  ALT  34<H>  /  AlkPhos  112  09-23              Microbiology;        RADIOLOGY & ADDITIONAL TESTS:    Imaging Personally Reviewed:

## 2019-09-23 NOTE — DISCHARGE NOTE PROVIDER - NSDCCPTREATMENT_GEN_ALL_CORE_FT
PRINCIPAL PROCEDURE  Procedure: CT chest w con  Findings and Treatment: INTERPRETATION:  CLINICAL INDICATION: Hemoptysis.  Axial CT images of the chest are obtained without intravenous   administration of contrast.  No prior chest CTs are available for comparison.  Small or benign-appearing bilateral axillary lymph nodes. Nonspecific   mediastinal lymph nodes with the largest in the right paratracheal   location measuring about 9 mm in short axis. Bilateral hilar lymph nodes   with the largest measuring about 1 cm within the right hilum, likely   reactive.  No pericardial effusion. No pleural effusions.  Evaluation of the upper abdominal organs demonstrate a 9 mm right renal   hypodensity likely a cyst. 2 X 1.1 cm nodule within the inferior aspect   of the left breast.  Evaluation of the lungs demonstrate patchy right upper lobe mixed solid   and groundglass opacity. 3 mm left upper lobe peripheral pulmonary nodule   on image 26 of series 2. Two adjacent tiny nodules within the left lower   lobe on image 83 of series 2 measuring up to 4 mm. No central   endobronchial lesions.  Mild upper thoracic spine scoliosis.  IMPRESSION: Right upper lobe mixed solid and groundglass patchy   groundglass opacity likely pneumonia. A 1 to 3 month follow-up chest CT   is recommended to ensure resolution and exclude primary lung neoplasm.  Mediastinal and hilar lymph nodes and 3 tiny left lung pulmonary nodules   can be monitored on the follow-up CT.  2 x 1.1 cm left breast nodule. Correlation with mammogram and/or   ultrasound is recommended for complete evaluation.        SECONDARY PROCEDURE  Procedure: Ultrasound of kidney and bladder  Findings and Treatment: FINDINGS:  Right kidney:  11.8 cm. No renal mass, hydronephrosis or calculi.  Left kidney:  10.8 cm. No renal mass, hydronephrosis or calculi.  Urinary bladder: Specular debris is present in the bladder.  IMPRESSION:   Sonographically normal kidneys. No hydronephrosis.  Specular debris in the bladder. Correlate with urinalysis.

## 2019-09-23 NOTE — DISCHARGE NOTE PROVIDER - CARE PROVIDERS DIRECT ADDRESSES
,DirectAddress_Unknown ,[email or web address],DirectAddress_Unknown,DirectAddress_Unknown

## 2019-09-23 NOTE — PROGRESS NOTE ADULT - PROBLEM SELECTOR PLAN 2
Cr on admission 1.8 vs. 1.11 on 9/12/19, downtrended and now increasing, stable today at 2.02 Concern for intra-renal, post-renal KAN, will r/o possible autoimmune disorder/vasculitis. Patient recently using increased doses of ASA at home for HA. Renal U/S non-remarkable. Bladder scan not indicative of urinary retention.   - Nephrology consult, for worsening KAN, appreciate recommendation, will consider renal biopsy if renal function continues to decline     - Will continue to monitor CMP   - Monitor Cr and UOP  - Avoid NSAIDs, ACEi/ARBs, contrast, nephrotoxic agents. Will continue to hold pt's home losartan.  - Renally dose meds - Cr on admission 1.8 vs. 1.11 on 9/12/19, downtrended and now increasing, stable today at 2.02   - Concern for intra-renal, post-renal KAN, will r/o possible autoimmune disorder/vasculitis  - Patient recently using increased doses of ASA at home for HA  - Renal U/S non-remarkable. Bladder scan not indicative of urinary retention.   - nephrology recommendations appreciated   - Will continue to monitor CMP   - Monitor Cr and UOP  - Avoid NSAIDs, ACEi/ARBs, contrast, nephrotoxic agents. Will continue to hold pt's home losartan.  - Renally dose meds

## 2019-09-23 NOTE — PROGRESS NOTE ADULT - SUBJECTIVE AND OBJECTIVE BOX
[facility] Follow Up Note    Interval:   Mood and Affect Appropriate ( x ),  Oriented to Time, Place, and Person x 3 ( x )    Ophthalmology Exam    Visual acuity (sc): 20/20 OU  Pupils: PERRL OU, no APD  Ttono: 20 OU   Extraocular movements (EOMs): Full OU, no pain, no diplopia  Confrontational Visual Field (CVF):  Full OU  Color Plates: 12/12 OU    Slit Lamp Exam (SLE)  External:  Flat OU  Lids/Lashes/Lacrimal Ducts: Flat OU    Sclera/Conjunctiva:  W+Q OU  Cornea: Cl OU  Anterior Chamber: +2 cell OD, +1 cell OS   Iris:  Flat OD , OS : posterior synechiae at 9 oclock and 2 oclock  Lens:  Cl OU    Fundus Exam: dilated with 1% tropicamide and 2.5% phenylephrine  Approval obtained from primary team for dilation  Patient aware that pupils can remained dilated for at least 4-6 hours  Exam performed with 20D lens    Vitreous: + cell OU   Disc, cup/disc: sharp and pink, 0.4 OU  Macula:  wnl OU  Vessels:  wnl OU  Periphery: wnl OU      Assessment and Plan:  INCOMPLETE NOTE     48 y/o F who is admitted for management of pneumonia and recent dx of anterior uveitis OD.   - continue Pred Forte QID OD   - Add cyclogyl TID OD   - pt has sign of previous uveitic reaction of the left eye and should thus be worked for systemic and rheumatologic causes of uveitis   - recommend VDRL, serum ACE, lysozyme, HLA-B27,   - will follow     Follow-Up:  Patient should follow up his/her ophthalmologist or in the [facility] within 1 week of discharge  [address] [facility].  [place] [id number] [phone] [facility] Ophthalmology Follow Up Note    Interval: Pt feels her eye pain in the right eye to be improved, has some irritation with drop instillation     Mood and Affect Appropriate ( x ),  Oriented to Time, Place, and Person x 3 ( x )    Ophthalmology Exam    Visual acuity (sc): 20/20 OU  Pupils: dilated OU   Ttono: 20 OU   Extraocular movements (EOMs): Full OU, no pain, no diplopia  Confrontational Visual Field (CVF):  Full OU  Color Plates: 12/12 OU    Slit Lamp Exam (SLE)  External:  Flat OU  Lids/Lashes/Lacrimal Ducts: Flat OU    Sclera/Conjunctiva:  W+Q OU  Cornea: Cl OU  Anterior Chamber: trace cell OD, trace cell OS   Iris:  dilated OU, pigment on lens capsule OS   Lens:  Cl OU    Fundus Exam: dilated with 1% tropicamide and 2.5% phenylephrine  Approval obtained from primary team for dilation  Patient aware that pupils can remained dilated for at least 4-6 hours  Exam performed with 20D lens    Vitreous: trace cell OU   Disc, cup/disc: sharp and pink, 0.4 OU  Macula:  wnl OU  Vessels:  wnl OU  Periphery: wnl OU      Assessment and Plan:  48 y/o F who is admitted for management of pneumonia and recent dx of anterior uveitis OD. Systemic workup reveals, Positive seromarkers for SLE and Sjogren's +JULI , +dsDNA, +scleroderma antibodies with CT Chest demonstrating hilar LAD and ground glass opacities.   - continue Pred Forte QID to the right eye only, cyclogyl TID to the right eye only  - coached patient in punctal occlusion following eye drop instillation   - serum ACE, lysozyme, HLA-B27,   - will follow     Follow-Up:  Patient should follow up his/her ophthalmologist or in the [facility] within 1 week of discharge  [address] [place] [id number] [phone] [facility] Ophthalmology Follow Up Note    Interval: Pt feels her eye pain in the right eye to be improved, has some irritation with drop instillation     Mood and Affect Appropriate ( x ),  Oriented to Time, Place, and Person x 3 ( x )    Ophthalmology Exam    Visual acuity (sc): 20/20 OU  Pupils: dilated OU   Ttono: 20 OU   Extraocular movements (EOMs): Full OU, no pain, no diplopia  Confrontational Visual Field (CVF):  Full OU  Color Plates: 12/12 OU    Slit Lamp Exam (SLE)  External:  Flat OU  Lids/Lashes/Lacrimal Ducts: Flat OU    Sclera/Conjunctiva:  W+Q OU  Cornea: Cl OU  Anterior Chamber: trace cell OD, trace cell (?pigmented) OS   Iris:  dilated OU, pigment on lens capsule OS   Lens:  Cl OU    Fundus Exam: dilated with 1% tropicamide and 2.5% phenylephrine  Approval obtained from primary team for dilation  Patient aware that pupils can remained dilated for at least 4-6 hours  Exam performed with 20D lens    Vitreous: trace cell OU   Disc, cup/disc: sharp and pink, 0.4 OU  Macula:  wnl OU  Vessels:  wnl OU  Periphery: wnl OU      Assessment and Plan:  50 y/o F who is admitted for management of pneumonia and recent dx of anterior uveitis OD. Systemic workup reveals, Positive seromarkers for SLE and Sjogren's +JULI , +dsDNA, +scleroderma antibodies with CT Chest demonstrating hilar LAD and ground glass opacities.   - continue Pred Forte QID to the right eye only, cyclogyl TID to the right eye only  - coached patient in punctal occlusion following eye drop instillation   - serum ACE, lysozyme, HLA-B27 pending  - will follow     Follow-Up:  Patient should follow up his/her ophthalmologist or in the [facility] within 1 week of discharge  [address] [place] [address]  [phone]

## 2019-09-23 NOTE — PROGRESS NOTE ADULT - SUBJECTIVE AND OBJECTIVE BOX
CHIEF COMPLAINT:    Interval Events:    REVIEW OF SYSTEMS:  CONSTITUTIONAL: No weakness, fevers or chills  EYES/ENT: No visual changes;  No vertigo or throat pain   NECK: No pain or stiffness  RESPIRATORY: No cough, wheezing, hemoptysis; No shortness of breath  CARDIOVASCULAR: No chest pain or palpitations  GASTROINTESTINAL: No abdominal or epigastric pain. No nausea, vomiting, or hematemesis; No diarrhea or constipation. No melena or hematochezia.  GENITOURINARY: No dysuria, frequency or hematuria  NEUROLOGICAL: No numbness or weakness  SKIN: No itching, burning, rashes, or lesions   All other review of systems is negative unless indicated above.    OBJECTIVE:  ICU Vital Signs Last 24 Hrs  T(C): 37.1 (22 Sep 2019 22:20), Max: 37.1 (22 Sep 2019 15:32)  T(F): 98.8 (22 Sep 2019 22:20), Max: 98.8 (22 Sep 2019 15:32)  HR: 81 (22 Sep 2019 22:20) (81 - 87)  BP: 132/71 (22 Sep 2019 22:20) (132/71 - 144/85)  BP(mean): --  ABP: --  ABP(mean): --  RR: 17 (22 Sep 2019 22:20) (17 - 17)  SpO2: 97% (22 Sep 2019 22:20) (96% - 100%)        CAPILLARY BLOOD GLUCOSE          PHYSICAL EXAM:  General: WN/WD NAD  Neurology: A&Ox3, nonfocal, OHARA x 4  Eyes: PERRLA/ EOMI, Gross vision intact  ENT/Neck: Neck supple, trachea midline, No JVD, Gross hearing intact  Respiratory: CTA B/L, No wheezing, rales, rhonchi  CV: RRR, +S1/S2, -S3/S4, no murmurs, rubs or gallops  Abdominal: Soft, NT, ND +BS, No HSM  MSK: 5/5 strength UE/LE bilaterally  Extremities: No edema, 2+ peripheral pulses  Skin: No Rashes, Hematoma, Ecchymosis  Incisions:   Tubes:    HOSPITAL MEDICATIONS:  MEDICATIONS  (STANDING):  cholecalciferol 400 Unit(s) Oral daily  cyclopentolate 0.5% Solution 1 Drop(s) Both EYES three times a day  docusate sodium 100 milliGRAM(s) Oral three times a day  folic acid 1 milliGRAM(s) Oral daily  influenza   Vaccine 0.5 milliLiter(s) IntraMuscular once  labetalol 100 milliGRAM(s) Oral two times a day  prednisoLONE acetate 1% Suspension 1 Drop(s) Both EYES four times a day  senna 2 Tablet(s) Oral at bedtime    MEDICATIONS  (PRN):  acetaminophen   Tablet .. 650 milliGRAM(s) Oral every 6 hours PRN Moderate Pain (4 - 6)  aluminum hydroxide/magnesium hydroxide/simethicone Suspension 30 milliLiter(s) Oral every 6 hours PRN Dyspepsia  ondansetron    Tablet 4 milliGRAM(s) Oral every 6 hours PRN Nausea and/or Vomiting  oxyCODONE    IR 5 milliGRAM(s) Oral every 6 hours PRN Severe Pain (7 - 10)      LABS:                        9.5    8.85  )-----------( 320      ( 22 Sep 2019 07:00 )             30.1     Hgb Trend: 9.5<--, 9.4<--, 9.8<--, 9.5<--, 9.3<--  09-22    136  |  99  |  12  ----------------------------<  119<H>  4.1   |  23  |  2.06<H>    Ca    9.1      22 Sep 2019 07:00  Phos  4.0     09-22  Mg     2.3     09-22    TPro  8.1  /  Alb  3.6  /  TBili  0.2  /  DBili  x   /  AST  46<H>  /  ALT  33  /  AlkPhos  122<H>  09-22    Creatinine Trend: 2.06<--, 2.02<--, 2.09<--, 1.91<--, 1.65<--, 1.80<--            MICROBIOLOGY:     Culture - Acid Fast - Sputum w/Smear (collected 21 Sep 2019 16:17)  Source: SPUTUM  Final Report (22 Sep 2019 14:32):    AFB SMEAR= NO ACID FAST BACILLI SEEN        RADIOLOGY:  [ ] Reviewed by me    PULMONARY FUNCTION TESTS:    EKG: CHIEF COMPLAINT:    Interval Events: Patient doing well this AM. Pleasant and conversational. Endorses some chest congestion and small streaks of blood in her sputum in the AM which abates with time. Denies fever, chills, N/V/D/C, hematuria. States that she overall feels better, but still a little tired. She states that she has chronic sinus issues that give her a headache and would like to try a sinus rinse to see if this helps her.     REVIEW OF SYSTEMS:  CONSTITUTIONAL: No weakness, fevers or chills  EYES/ENT: No visual changes;  No vertigo or throat pain   NECK: No pain or stiffness  RESPIRATORY: +cough. No wheezing, hemoptysis; No shortness of breath  CARDIOVASCULAR: No chest pain or palpitations  GASTROINTESTINAL: No abdominal or epigastric pain. No nausea, vomiting, or hematemesis; No diarrhea or constipation. No melena or hematochezia.  GENITOURINARY: No dysuria, frequency or hematuria  NEUROLOGICAL: No numbness or weakness  SKIN: No itching, burning, rashes, or lesions   All other review of systems is negative unless indicated above.    OBJECTIVE:  ICU Vital Signs Last 24 Hrs  T(C): 37.1 (22 Sep 2019 22:20), Max: 37.1 (22 Sep 2019 15:32)  T(F): 98.8 (22 Sep 2019 22:20), Max: 98.8 (22 Sep 2019 15:32)  HR: 81 (22 Sep 2019 22:20) (81 - 87)  BP: 132/71 (22 Sep 2019 22:20) (132/71 - 144/85)  BP(mean): --  ABP: --  ABP(mean): --  RR: 17 (22 Sep 2019 22:20) (17 - 17)  SpO2: 97% (22 Sep 2019 22:20) (96% - 100%)        CAPILLARY BLOOD GLUCOSE          PHYSICAL EXAM:  General: WN/WD NAD  Neurology: A&Ox3, nonfocal, OHARA x 4  Eyes: PERRLA/ EOMI, Gross vision intact  ENT/Neck: Neck supple, trachea midline, No JVD, Gross hearing intact  Respiratory: CTA B/L, No wheezing, rales, rhonchi  CV: RRR, +S1/S2, -S3/S4, no murmurs, rubs or gallops  Abdominal: Soft, NT, ND +BS, No HSM  MSK: 5/5 strength UE/LE bilaterally  Extremities: No edema, 2+ peripheral pulses  Skin: No Rashes, Hematoma, Ecchymosis      HOSPITAL MEDICATIONS:  MEDICATIONS  (STANDING):  cholecalciferol 400 Unit(s) Oral daily  cyclopentolate 0.5% Solution 1 Drop(s) Both EYES three times a day  docusate sodium 100 milliGRAM(s) Oral three times a day  folic acid 1 milliGRAM(s) Oral daily  influenza   Vaccine 0.5 milliLiter(s) IntraMuscular once  labetalol 100 milliGRAM(s) Oral two times a day  prednisoLONE acetate 1% Suspension 1 Drop(s) Both EYES four times a day  senna 2 Tablet(s) Oral at bedtime    MEDICATIONS  (PRN):  acetaminophen   Tablet .. 650 milliGRAM(s) Oral every 6 hours PRN Moderate Pain (4 - 6)  aluminum hydroxide/magnesium hydroxide/simethicone Suspension 30 milliLiter(s) Oral every 6 hours PRN Dyspepsia  ondansetron    Tablet 4 milliGRAM(s) Oral every 6 hours PRN Nausea and/or Vomiting  oxyCODONE    IR 5 milliGRAM(s) Oral every 6 hours PRN Severe Pain (7 - 10)      LABS:                        9.5    8.85  )-----------( 320      ( 22 Sep 2019 07:00 )             30.1     Hgb Trend: 9.5<--, 9.4<--, 9.8<--, 9.5<--, 9.3<--  09-22    136  |  99  |  12  ----------------------------<  119<H>  4.1   |  23  |  2.06<H>    Ca    9.1      22 Sep 2019 07:00  Phos  4.0     09-22  Mg     2.3     09-22    TPro  8.1  /  Alb  3.6  /  TBili  0.2  /  DBili  x   /  AST  46<H>  /  ALT  33  /  AlkPhos  122<H>  09-22    Creatinine Trend: 2.06<--, 2.02<--, 2.09<--, 1.91<--, 1.65<--, 1.80<--            MICROBIOLOGY:     Culture - Acid Fast - Sputum w/Smear (collected 21 Sep 2019 16:17)  Source: SPUTUM  Final Report (22 Sep 2019 14:32):    AFB SMEAR= NO ACID FAST BACILLI SEEN

## 2019-09-23 NOTE — PROGRESS NOTE ADULT - PROBLEM SELECTOR PLAN 6
Stable. Hgb 10.2 on admission in setting of hemoptysis. Per pt, has h/o anemia and has been on folic acid supplements as a result.  Elevated Haptoglobin  - Monitor H/H and transfuse pRBCs to keep Hgb > 7  - C/w folic acid 1 mg daily  - Folate B12 wnl, iron, TIBC, ferritin wnl - SBP in the 130s, acceptable  - Will hold pt's home losartan for now given KAN  - Will hold pt's home amlodipine for now given recent hemoptysis  - C/w labetalol 100 mg bid with hold parameters

## 2019-09-23 NOTE — PROGRESS NOTE ADULT - SUBJECTIVE AND OBJECTIVE BOX
Ashli Stevens, PGY 1  [facility]: [id number]  [facility]: [phone]    CC: Patient is a 49y old  Female who presents with a chief complaint of Hemoptysis (22 Sep 2019 08:41)      SUBJECTIVE / OVERNIGHT EVENTS:     MEDICATIONS  (STANDING):  cholecalciferol 400 Unit(s) Oral daily  cyclopentolate 0.5% Solution 1 Drop(s) Both EYES three times a day  docusate sodium 100 milliGRAM(s) Oral three times a day  folic acid 1 milliGRAM(s) Oral daily  influenza   Vaccine 0.5 milliLiter(s) IntraMuscular once  labetalol 100 milliGRAM(s) Oral two times a day  prednisoLONE acetate 1% Suspension 1 Drop(s) Both EYES four times a day  senna 2 Tablet(s) Oral at bedtime    MEDICATIONS  (PRN):  acetaminophen   Tablet .. 650 milliGRAM(s) Oral every 6 hours PRN Moderate Pain (4 - 6)  aluminum hydroxide/magnesium hydroxide/simethicone Suspension 30 milliLiter(s) Oral every 6 hours PRN Dyspepsia  ondansetron    Tablet 4 milliGRAM(s) Oral every 6 hours PRN Nausea and/or Vomiting  oxyCODONE    IR 5 milliGRAM(s) Oral every 6 hours PRN Severe Pain (7 - 10)    Vitals:  Vital Signs Last 24 Hrs  T(C): 36.7 (23 Sep 2019 05:57), Max: 37.1 (22 Sep 2019 15:32)  T(F): 98 (23 Sep 2019 05:57), Max: 98.8 (22 Sep 2019 15:32)  HR: 80 (23 Sep 2019 05:57) (80 - 87)  BP: 135/82 (23 Sep 2019 05:57) (132/71 - 144/85)  BP(mean): --  RR: 17 (23 Sep 2019 05:57) (17 - 17)  SpO2: 97% (23 Sep 2019 05:57) (96% - 100%)    PHYSICAL EXAM  GENERAL: NAD, well-developed  NEURO: AO x3  HEAD:  Atraumatic, Normocephalic  EYES: conjunctiva and sclera clear  NECK: Supple  CHEST/LUNG: Clear to auscultation bilaterally; No wheezes, rales or rhonchi  HEART: Regular rate and rhythm; No murmurs, rubs, or gallops  ABDOMEN: Soft, Nontender, Nondistended; Bowel sounds present, no masses.  EXTREMITIES:  2+ Peripheral Pulses, No clubbing, cyanosis, or edema  SKIN: Warm, dry, in tact, no rashes or lesions  PSYCH: affect appropriate    LABS:                        9.5    8.85  )-----------( 320      ( 22 Sep 2019 07:00 )             30.1         136  |  99  |  12  ----------------------------<  119<H>  4.1   |  23  |  2.06<H>    Ca    9.1      22 Sep 2019 07:00  Phos  4.0       Mg     2.3         TPro  8.1  /  Alb  3.6  /  TBili  0.2  /  DBili  x   /  AST  46<H>  /  ALT  33  /  AlkPhos  122<H>            Urinalysis Basic - ( 20 Sep 2019 19:44 )    Color: LIGHT YELLOW / Appearance: CLEAR / S.012 / pH: 6.5  Gluc: NEGATIVE / Ketone: NEGATIVE  / Bili: NEGATIVE / Urobili: NORMAL   Blood: NEGATIVE / Protein: 20 / Nitrite: NEGATIVE   Leuk Esterase: TRACE / RBC: 3-5 / WBC 6-10   Sq Epi: OCC / Non Sq Epi: x / Bacteria: NEGATIVE      I&O's Summary Ashli Ramseyng, PGY 1  [facility]: [id number]  [facility]: [phone]    CC: Patient is a 49y old  Female who presents with a chief complaint of Hemoptysis (22 Sep 2019 08:41)      SUBJECTIVE / OVERNIGHT EVENTS:   No acute events overnight. TB was ruled out and therefore she is no longer on airborne precautions. She complained of some b/l eye discomfort, and photophobia, which had been complicated by headaches. She believes this is from the new eye drops. Otherwise, pt also had rhinorrhea, secondary to what she calls sinusitis. Otherwise, no CP, SOB, f/c, n/v, abdominal pain, or urinary symptoms.       MEDICATIONS  (STANDING):  cholecalciferol 400 Unit(s) Oral daily  cyclopentolate 0.5% Solution 1 Drop(s) Both EYES three times a day  docusate sodium 100 milliGRAM(s) Oral three times a day  folic acid 1 milliGRAM(s) Oral daily  influenza   Vaccine 0.5 milliLiter(s) IntraMuscular once  labetalol 100 milliGRAM(s) Oral two times a day  prednisoLONE acetate 1% Suspension 1 Drop(s) Both EYES four times a day  senna 2 Tablet(s) Oral at bedtime    MEDICATIONS  (PRN):  acetaminophen   Tablet .. 650 milliGRAM(s) Oral every 6 hours PRN Moderate Pain (4 - 6)  aluminum hydroxide/magnesium hydroxide/simethicone Suspension 30 milliLiter(s) Oral every 6 hours PRN Dyspepsia  ondansetron    Tablet 4 milliGRAM(s) Oral every 6 hours PRN Nausea and/or Vomiting  oxyCODONE    IR 5 milliGRAM(s) Oral every 6 hours PRN Severe Pain (7 - 10)    Vitals:  Vital Signs Last 24 Hrs  T(C): 36.7 (23 Sep 2019 05:57), Max: 37.1 (22 Sep 2019 15:32)  T(F): 98 (23 Sep 2019 05:57), Max: 98.8 (22 Sep 2019 15:32)  HR: 80 (23 Sep 2019 05:57) (80 - 87)  BP: 135/82 (23 Sep 2019 05:57) (132/71 - 144/85)  BP(mean): --  RR: 17 (23 Sep 2019 05:57) (17 - 17)  SpO2: 97% (23 Sep 2019 05:57) (96% - 100%)    PHYSICAL EXAM  GENERAL: well developed woman, sleeping in bed comfortably in NAD  NEURO: no focal neurologic deficits, AAOX3   HEAD:  Atraumatic, Normocephalic  EYES: wearing sunglasses, conjunctiva and sclera clear, EOMI, no pain on eye movement  CHEST/LUNG: Clear to auscultation bilaterally; No wheezes, rales or rhonchi  HEART: Regular rate and rhythm; No murmurs, rubs, or gallops  ABDOMEN: Soft, Nontender, Nondistended; Bowel sounds present, no masses.  EXTREMITIES:  warm and well perfused, no pedal edema  SKIN: Warm, dry, in tact, no rashes or lesions  PSYCH: affect appropriate    LABS:                                 9.5    8.85  )-----------( 320      ( 22 Sep 2019 07:00 )             30.1     09-23    134<L>  |  98  |  12  ----------------------------<  124<H>  4.0   |  23  |  2.06<H>    Ca    8.9      23 Sep 2019 07:35  Phos  4.0     -  Mg     2.3     -    TPro  7.5  /  Alb  3.5  /  TBili  0.3  /  DBili  x   /  AST  40<H>  /  ALT  34<H>  /  AlkPhos  112  09-23    Scleroderma positive         Urinalysis Basic - ( 20 Sep 2019 19:44 )    Color: LIGHT YELLOW / Appearance: CLEAR / S.012 / pH: 6.5  Gluc: NEGATIVE / Ketone: NEGATIVE  / Bili: NEGATIVE / Urobili: NORMAL   Blood: NEGATIVE / Protein: 20 / Nitrite: NEGATIVE   Leuk Esterase: TRACE / RBC: 3-5 / WBC 6-10   Sq Epi: OCC / Non Sq Epi: x / Bacteria: NEGATIVE      I&O's Summary Ashli Ramseyng, PGY 1  [facility]: [id number]  [facility]: [phone]    CC: Patient is a 49y old  Female who presents with a chief complaint of Hemoptysis (22 Sep 2019 08:41)      SUBJECTIVE / OVERNIGHT EVENTS:   No acute events overnight. TB was ruled out and therefore she is no longer on airborne precautions. She complained of some b/l eye discomfort, and photophobia, which had been complicated by headaches. She believes this is from the new eye drops. Otherwise, pt also had rhinorrhea, secondary to what she calls sinusitis. Otherwise, no CP, SOB, f/c, n/v, abdominal pain, or urinary symptoms.       MEDICATIONS  (STANDING):  cholecalciferol 400 Unit(s) Oral daily  cyclopentolate 0.5% Solution 1 Drop(s) Both EYES three times a day  docusate sodium 100 milliGRAM(s) Oral three times a day  folic acid 1 milliGRAM(s) Oral daily  influenza   Vaccine 0.5 milliLiter(s) IntraMuscular once  labetalol 100 milliGRAM(s) Oral two times a day  prednisoLONE acetate 1% Suspension 1 Drop(s) Both EYES four times a day  senna 2 Tablet(s) Oral at bedtime    MEDICATIONS  (PRN):  acetaminophen   Tablet .. 650 milliGRAM(s) Oral every 6 hours PRN Moderate Pain (4 - 6)  aluminum hydroxide/magnesium hydroxide/simethicone Suspension 30 milliLiter(s) Oral every 6 hours PRN Dyspepsia  ondansetron    Tablet 4 milliGRAM(s) Oral every 6 hours PRN Nausea and/or Vomiting  oxyCODONE    IR 5 milliGRAM(s) Oral every 6 hours PRN Severe Pain (7 - 10)    Vitals:  Vital Signs Last 24 Hrs  T(C): 36.7 (23 Sep 2019 05:57), Max: 37.1 (22 Sep 2019 15:32)  T(F): 98 (23 Sep 2019 05:57), Max: 98.8 (22 Sep 2019 15:32)  HR: 80 (23 Sep 2019 05:57) (80 - 87)  BP: 135/82 (23 Sep 2019 05:57) (132/71 - 144/85)  BP(mean): --  RR: 17 (23 Sep 2019 05:57) (17 - 17)  SpO2: 97% (23 Sep 2019 05:57) (96% - 100%)    PHYSICAL EXAM  GENERAL: well developed woman, sleeping in bed comfortably in NAD  NEURO: no focal neurologic deficits, AAOX3   HEAD:  Atraumatic, Normocephalic  EYES: wearing sunglasses, conjunctiva and sclera clear, EOMI, no pain on eye movement  CHEST/LUNG: Clear to auscultation bilaterally; No wheezes, rales or rhonchi  HEART: Regular rate and rhythm; No murmurs, rubs, or gallops  ABDOMEN: Soft, Nontender, Nondistended; Bowel sounds present, no masses.  EXTREMITIES:  warm and well perfused, no pedal edema  SKIN: Warm, dry, in tact, no rashes or lesions  PSYCH: affect appropriate    LABS:                                 9.5    8.85  )-----------( 320      ( 22 Sep 2019 07:00 )             30.1     09-23    134<L>  |  98  |  12  ----------------------------<  124<H>  4.0   |  23  |  2.06<H>    Ca    8.9      23 Sep 2019 07:35  Phos  4.0     09-  Mg     2.3     09-    TPro  7.5  /  Alb  3.5  /  TBili  0.3  /  DBili  x   /  AST  40<H>  /  ALT  34<H>  /  AlkPhos  112  09-23    Scleroderma positive 1.2  Homogenous JULI  Anti-DS DNA positive         Urinalysis Basic - ( 20 Sep 2019 19:44 )    Color: LIGHT YELLOW / Appearance: CLEAR / S.012 / pH: 6.5  Gluc: NEGATIVE / Ketone: NEGATIVE  / Bili: NEGATIVE / Urobili: NORMAL   Blood: NEGATIVE / Protein: 20 / Nitrite: NEGATIVE   Leuk Esterase: TRACE / RBC: 3-5 / WBC 6-10   Sq Epi: OCC / Non Sq Epi: x / Bacteria: NEGATIVE      I&O's Summary Ashli Ramseyng, PGY 1  [facility]: [id number]  [facility]: [phone]    CC: Patient is a 49y old  Female who presents with a chief complaint of Hemoptysis (22 Sep 2019 08:41)      SUBJECTIVE / OVERNIGHT EVENTS:   No acute events overnight. TB was ruled out and therefore she is no longer on airborne precautions. She complained of some b/l eye discomfort, and photophobia, which had been complicated by headaches. She believes this is from the new eye drops. Otherwise, pt also had rhinorrhea, secondary to what she calls sinusitis. Otherwise, no CP, SOB, f/c, n/v, abdominal pain, or urinary symptoms.       MEDICATIONS  (STANDING):  cholecalciferol 400 Unit(s) Oral daily  cyclopentolate 0.5% Solution 1 Drop(s) Both EYES three times a day  docusate sodium 100 milliGRAM(s) Oral three times a day  folic acid 1 milliGRAM(s) Oral daily  influenza   Vaccine 0.5 milliLiter(s) IntraMuscular once  labetalol 100 milliGRAM(s) Oral two times a day  prednisoLONE acetate 1% Suspension 1 Drop(s) Both EYES four times a day  senna 2 Tablet(s) Oral at bedtime    MEDICATIONS  (PRN):  acetaminophen   Tablet .. 650 milliGRAM(s) Oral every 6 hours PRN Moderate Pain (4 - 6)  aluminum hydroxide/magnesium hydroxide/simethicone Suspension 30 milliLiter(s) Oral every 6 hours PRN Dyspepsia  ondansetron    Tablet 4 milliGRAM(s) Oral every 6 hours PRN Nausea and/or Vomiting  oxyCODONE    IR 5 milliGRAM(s) Oral every 6 hours PRN Severe Pain (7 - 10)    Vitals:  Vital Signs Last 24 Hrs  T(C): 36.7 (23 Sep 2019 05:57), Max: 37.1 (22 Sep 2019 15:32)  T(F): 98 (23 Sep 2019 05:57), Max: 98.8 (22 Sep 2019 15:32)  HR: 80 (23 Sep 2019 05:57) (80 - 87)  BP: 135/82 (23 Sep 2019 05:57) (132/71 - 144/85)  BP(mean): --  RR: 17 (23 Sep 2019 05:57) (17 - 17)  SpO2: 97% (23 Sep 2019 05:57) (96% - 100%)    PHYSICAL EXAM  GENERAL: well developed woman, sleeping in bed comfortably in NAD  NEURO: no focal neurologic deficits, AAOX3   HEAD:  Atraumatic, Normocephalic  EYES: wearing sunglasses, conjunctiva and sclera clear, EOMI, no pain on eye movement  CHEST/LUNG: Clear to auscultation bilaterally; No wheezes, rales or rhonchi  HEART: Regular rate and rhythm; No murmurs, rubs, or gallops  ABDOMEN: Soft, Nontender, Nondistended; Bowel sounds present, no masses.  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  Homogenous JULI  Anti-DS DNA elevated at 59        Urinalysis Basic - ( 20 Sep 2019 19:44 )    Color: LIGHT YELLOW / Appearance: CLEAR / S.012 / pH: 6.5  Gluc: NEGATIVE / Ketone: NEGATIVE  / Bili: NEGATIVE / Urobili: NORMAL   Blood: NEGATIVE / Protein: 20 / Nitrite: NEGATIVE   Leuk Esterase: TRACE / RBC: 3-5 / WBC 6-10   Sq Epi: OCC / Non Sq Epi: x / Bacteria: NEGATIVE      I&O's Summary Ashli Ramseyng, PGY 1  [facility]: [id number]  [facility]: [phone]    CC: Patient is a 49y old  Female who presents with a chief complaint of Hemoptysis (22 Sep 2019 08:41)      SUBJECTIVE / OVERNIGHT EVENTS:   No acute events overnight. TB was ruled out and therefore she is no longer on airborne precautions. She complained of some b/l eye discomfort, and photophobia, which had been complicated by headaches. She believes this is from the new eye drops. Otherwise, pt also had rhinorrhea, secondary to what she calls sinusitis. Otherwise, no CP, SOB, f/c, n/v, abdominal pain, or urinary symptoms.       MEDICATIONS  (STANDING):  cholecalciferol 400 Unit(s) Oral daily  cyclopentolate 0.5% Solution 1 Drop(s) Both EYES three times a day  docusate sodium 100 milliGRAM(s) Oral three times a day  folic acid 1 milliGRAM(s) Oral daily  influenza   Vaccine 0.5 milliLiter(s) IntraMuscular once  labetalol 100 milliGRAM(s) Oral two times a day  prednisoLONE acetate 1% Suspension 1 Drop(s) Both EYES four times a day  senna 2 Tablet(s) Oral at bedtime      MEDICATIONS  (PRN):  acetaminophen   Tablet .. 650 milliGRAM(s) Oral every 6 hours PRN Moderate Pain (4 - 6)  aluminum hydroxide/magnesium hydroxide/simethicone Suspension 30 milliLiter(s) Oral every 6 hours PRN Dyspepsia  ondansetron    Tablet 4 milliGRAM(s) Oral every 6 hours PRN Nausea and/or Vomiting  oxyCODONE    IR 5 milliGRAM(s) Oral every 6 hours PRN Severe Pain (7 - 10)    Vitals:  Vital Signs Last 24 Hrs  T(C): 36.7 (23 Sep 2019 05:57), Max: 37.1 (22 Sep 2019 15:32)  T(F): 98 (23 Sep 2019 05:57), Max: 98.8 (22 Sep 2019 15:32)  HR: 80 (23 Sep 2019 05:57) (80 - 87)  BP: 135/82 (23 Sep 2019 05:57) (132/71 - 144/85)  BP(mean): --  RR: 17 (23 Sep 2019 05:57) (17 - 17)  SpO2: 97% (23 Sep 2019 05:57) (96% - 100%)    PHYSICAL EXAM  GENERAL: well developed woman, sleeping in bed comfortably in NAD  NEURO: no focal neurologic deficits, AAOX3   HEAD:  Atraumatic, Normocephalic  EYES: wearing sunglasses, conjunctiva and sclera clear, EOMI, no pain on eye movement  CHEST/LUNG: Clear to auscultation bilaterally; No wheezes, rales or rhonchi  HEART: Regular rate and rhythm; No murmurs, rubs, or gallops  ABDOMEN: Soft, Nontender, Nondistended; Bowel sounds present, no masses.  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

## 2019-09-23 NOTE — DISCHARGE NOTE PROVIDER - NSDCFUADDAPPT_GEN_ALL_CORE_FT
Javed Coyle (primary care physician)  [address] [place] [address]  Phone: [phone]  Follow Up Time: Monday, Oct 30, 2019 at 10:20AM

## 2019-09-23 NOTE — DISCHARGE NOTE PROVIDER - HOSPITAL COURSE
This is  with a 49 y.o female with PMH of HTN and migraine headaches (well controlled) who presents with new onset hemoptysis x 3 while at an ophthalmologist's office on 09/17/19. Of note, patient is an employee of the department of [employer] and frequently visits incarcerated patients. Prior to this hospitalization, the patient had unresolving ophthalmologic issues, including b/l photophobia, eye pain, redness, tearing since 09/07/19. Initially the patient came to the ED on 09/08/19 and was diagnosed with acute conjunctivitis, and was sent home with Trimethoprim, Polymyxin B eye drops and Flonase. Since that point, her symptoms have worsen and she again returned to the ED on 09/12/19 for continued symptoms, but also migraine like headaches, nausea, NBNB vomiting. She was discharged home from this visit with pain medications. She followed up with an outpatient ophthalmologist, and while at the appointment, she had 3 episodes of hemoptysis (small volume, dime sized). She denied CP, SOB, palpitations, but endorsed fevers, chills, night and night sweats. In the ED, she was febrile to 100.4, HR , -140s/80s, RR- 16, satting % on RA. CXR was clear without any cavitary lesions. White count was 10.6 on admission. CT scan of the chest demonstrated right upper lobe mixed solid and ground glass patchy opacity likely pneumonia. Additionally mediastinal and hilar lymph nodes and 3 tiny left lung pulmonary nodules, which can be monitored on follow up CT. Additionally a left breast nodule was noted, in which an ultrasound was recommended to complete the evaluation. She was admitted for further work up and management of symptoms. Most concerning for TB vs. infectious vs. autoimmune etiology. Of note, the pt has a strong family history of autoimmune diseases, including a daughter who has sarcoidosis diagnosed on lung biopsy.         Upon admission, the patient was placed on TB precautions. At this point, with her diagnosis of anterior uveitis and hemoptysis, rheumatology was on board, with differential diagnosis being sarcoidosis, SLE, GPA, Goodpasture's, Behcet's and other vasculitis. This is  with a 49 y.o female with PMH of HTN and migraine headaches (well controlled) who presents with new onset hemoptysis x 3 while at an ophthalmologist's office on 09/17/19. Of note, patient is an employee of the department of [employer] and frequently visits incarcerated patients. Prior to this hospitalization, the patient had unresolving ophthalmologic issues, including b/l photophobia, eye pain, redness, tearing since 09/07/19. Initially the patient came to the ED on 09/08/19 and was diagnosed with acute conjunctivitis, and was sent home with Trimethoprim, Polymyxin B eye drops and Flonase. Since that point, her symptoms have worsen and she again returned to the ED on 09/12/19 for continued symptoms, but also migraine like headaches, nausea, NBNB vomiting. She was discharged home from this visit with pain medications. She followed up with an outpatient ophthalmologist, and while at the appointment, she had 3 episodes of hemoptysis (small volume, dime sized). She denied CP, SOB, palpitations, but endorsed fevers, chills, night and night sweats. In the ED, she was febrile to 100.4, HR , -140s/80s, RR- 16, satting % on RA. CXR was clear without any cavitary lesions. White count was 10.6 on admission. CT scan of the chest demonstrated right upper lobe mixed solid and ground glass patchy opacity likely pneumonia. Additionally mediastinal and hilar lymph nodes and 3 tiny left lung pulmonary nodules, which can be monitored on follow up CT. Additionally a left breast nodule was noted, in which an ultrasound was recommended to complete the evaluation. She was admitted for further work up and management of symptoms. Most concerning for TB vs. infectious vs. autoimmune etiology. Of note, the pt has a strong family history of autoimmune diseases, including a daughter who has sarcoidosis diagnosed on lung biopsy.         Upon admission, the patient was placed on TB precautions. At this point, with her diagnosis of anterior uveitis and hemoptysis, rheumatology was on board, with differential diagnosis being sarcoidosis, SLE, GPA, Goodpasture's, Behcet's and other vasculitis. TB had been ruled out with quantiferon and AFB sputum neg x3. From an infectious standpoint, the patient had negative blood cultures, but sputum culture grew gram positive cocci in pairs, the patient was treated with 5 days of IV ceftriaxone. She was negative for HIV, blood cultures were negative and treponema pallidum antibodies were also negative. and In terms rheumatologic work up, the patient was positive for scleroderma antibodies, HLA B27, positive JULI, positive DSDNA, which is consistent with possible mixed connective tissue disease. Additionally because of her diagnosis of anterior uveitis and strong family history, hemoptysis, sarcoidosis is also high on the differential. But additionally, it seems that her 25 vitamin D was normal and ACE is wnl. It is not possible to point towards one definitive rheumatologic diagnosis at this point. Though all related, in nephrologic terms, the patient was admitted with a serum creatinine of 1.11, which jumped to 1.80 the next day and has been slowly up trending. Possible diagnosis consists of autoimmune etiology affecting the kidney, pre-renal and post- renal etiology. Post renal etiology was ruled out since renal ultrasound did not show masses, calculi or hydronephrosis. Bladder scan was negative. UA was also negative for blood and protein. Additionally, the patient endorses taking Excedrin, nearly 2grams daily for 10 days. And also she takes losartan at home. Losartan was discontinued in the hospital and all nephrotoxic drugs have been avoided. Her creatinine trended down to 2.20 on day of admission. As per nephrology, she can be safely monitoried outpatient with close follow up. Currently, a renal biopsy is not warranted. The patient currently is hemodynamically stable, able to tolerate PO, ambulating without difficulty and has no complaints. Though no definitive diagnosis has been made on this admission, she was treated for bacterial pneumonia, she is ready for safe medical discharge home with follow up BMP within 5 days of discharge to monitor the creatinine. She should follow up closely with nephrology, rheumatology and primary care physician. This is  with a 49 y.o female with PMH of HTN and migraine headaches (well controlled) who presents with new onset hemoptysis x 3 while at an ophthalmologist's office on 09/17/19. Of note, patient is an employee of the department of [employer] and frequently visits incarcerated patients. Prior to this hospitalization, the patient had unresolving ophthalmologic issues, including b/l photophobia, eye pain, redness, tearing since 09/07/19. Initially the patient came to the ED on 09/08/19 and was diagnosed with acute conjunctivitis, and was sent home with Trimethoprim, Polymyxin B eye drops and Flonase. Since that point, her symptoms have worsen and she again returned to the ED on 09/12/19 for continued symptoms, but also migraine like headaches, nausea, NBNB vomiting. She was discharged home from this visit with pain medications. She followed up with an outpatient ophthalmologist, and while at the appointment, she had 3 episodes of hemoptysis (small volume, dime sized). She denied CP, SOB, palpitations, but endorsed fevers, chills, night and night sweats. In the ED, she was febrile to 100.4, HR , -140s/80s, RR- 16, satting % on RA. CXR was clear without any cavitary lesions. White count was 10.6 on admission. CT scan of the chest demonstrated right upper lobe mixed solid and ground glass patchy opacity likely pneumonia. Additionally mediastinal and hilar lymph nodes and 3 tiny left lung pulmonary nodules, which can be monitored on follow up CT. Additionally a left breast nodule was noted, in which an ultrasound was recommended to complete the evaluation. She was admitted for further work up and management of symptoms. Most concerning for TB vs. infectious vs. autoimmune etiology. Of note, the pt has a strong family history of autoimmune diseases, including a daughter who has sarcoidosis diagnosed on lung biopsy.         Upon admission, the patient was placed on TB precautions. At this point, with her diagnosis of anterior uveitis and hemoptysis, rheumatology was on board, with differential diagnosis being sarcoidosis, SLE, GPA, Goodpasture's, Behcet's and other vasculitis. TB had been ruled out with quantiferon and AFB sputum neg x3. From an infectious standpoint, the patient had negative blood cultures, but sputum culture grew gram positive cocci in pairs, the patient was treated with 5 days of IV ceftriaxone. She was negative for HIV, blood cultures were negative and treponema pallidum antibodies were also negative. and In terms rheumatologic work up, the patient was positive for scleroderma antibodies, HLA B27, positive JULI, positive DSDNA, which is consistent with possible mixed connective tissue disease. Additionally because of her diagnosis of anterior uveitis and strong family history, hemoptysis, sarcoidosis is also high on the differential. But additionally, it seems that her 25 vitamin D was normal and ACE is wnl. It is not possible to point towards one definitive rheumatologic diagnosis at this point. Though all related, in nephrologic terms, the patient was admitted with a serum creatinine of 1.11, which jumped to 1.80 the next day and has been slowly up trending. Possible diagnosis consists of autoimmune etiology affecting the kidney, pre-renal and post- renal etiology. Post renal etiology was ruled out since renal ultrasound did not show masses, calculi or hydronephrosis. Bladder scan was negative. UA was also negative for blood and protein. Additionally, the patient endorses taking Excedrin, nearly 2grams daily for 10 days. And also she takes losartan at home. Losartan was discontinued in the hospital and all nephrotoxic drugs have been avoided. Her creatinine trended down to 2.20 on day of admission. As per nephrology, she can be safely monitoried outpatient with close follow up. Currently, a renal biopsy is not warranted. Additionally during this hospitalization, CT scan of the chest revealed a 2 x 1.1 cm nodule within the inferior aspect of the left breast. Additionally, two adjacent nodules within the left lower lobe of the lung were also seen, along with patchy right upper lobe mixed solid and groundglass opacity. Additionally a 3mm left upper lobe peripheral pulmonary nodule was seen. This was discussed with the patient. These pulmonary nodules can be followed up with outpatient pulmonologist and the nodule in the left breast can also be followed up as an outpatient. The patient currently is hemodynamically stable, able to tolerate PO, ambulating without difficulty and has no complaints. Though no definitive diagnosis has been made on this admission, she was treated for bacterial pneumonia, she is ready for safe medical discharge home with follow up BMP within 5 days of discharge to monitor the creatinine. She should follow up closely with nephrology, rheumatology and primary care physician. This is  with a 49 y.o female with PMH of HTN and migraine headaches (well controlled) who presents with new onset hemoptysis x 3 while at an ophthalmologist's office on 09/17/19. Of note, patient is an employee of the department of [employer] and frequently visits incarcerated patients. Prior to this hospitalization, the patient had unresolving ophthalmologic issues, including b/l photophobia, eye pain, redness, tearing since 09/07/19. Initially the patient came to the ED on 09/08/19 and was diagnosed with acute conjunctivitis, and was sent home with Trimethoprim, Polymyxin B eye drops and Flonase. Since that point, her symptoms have worsen and she again returned to the ED on 09/12/19 for continued symptoms, but also migraine like headaches, nausea, NBNB vomiting. She was discharged home from this visit with pain medications. She followed up with an outpatient ophthalmologist, and while at the appointment, she had 3 episodes of hemoptysis (small volume, dime sized). She denied CP, SOB, palpitations, but endorsed fevers, chills, night and night sweats. In the ED, she was febrile to 100.4, HR , -140s/80s, RR- 16, satting % on RA. CXR was clear without any cavitary lesions. White count was 10.6 on admission. CT scan of the chest demonstrated right upper lobe mixed solid and ground glass patchy opacity likely pneumonia. Additionally mediastinal and hilar lymph nodes and 3 tiny left lung pulmonary nodules, which can be monitored on follow up CT. Additionally a left breast nodule was noted, in which an ultrasound was recommended to complete the evaluation. She was admitted for further work up and management of symptoms. Most concerning for TB vs. infectious vs. autoimmune etiology. Of note, the pt has a strong family history of autoimmune diseases, including a daughter who has sarcoidosis diagnosed on lung biopsy.         Upon admission, the patient was placed on TB precautions. At this point, with her diagnosis of anterior uveitis and hemoptysis, rheumatology was on board, with differential diagnosis being sarcoidosis, SLE, GPA, Goodpasture's, Behcet's and other vasculitis. TB had been ruled out with quantiferon and AFB sputum neg x3. From an infectious standpoint, the patient had negative blood cultures, but sputum culture grew gram positive cocci in pairs, the patient was treated with 5 days of IV ceftriaxone. She was negative for HIV, blood cultures were negative and treponema pallidum antibodies were also negative. and In terms rheumatologic work up, the patient was positive for scleroderma antibodies, HLA B27, positive JULI, positive DSDNA, which is consistent with possible mixed connective tissue disease. Additionally because of her diagnosis of anterior uveitis and strong family history, hemoptysis, sarcoidosis is also high on the differential. But additionally, it seems that her 25 vitamin D was normal and ACE is wnl. It is not possible to point towards one definitive rheumatologic diagnosis at this point. Though all related, in nephrologic terms, the patient was admitted with a serum creatinine of 1.11, which jumped to 1.80 the next day and peaked at SCr of 2.26 which has now plateaued. Possible diagnosis consists of autoimmune etiology affecting the kidney, pre-renal and post- renal etiology. Post renal etiology was ruled out since renal ultrasound did not show masses, calculi or hydronephrosis. Bladder scan was negative. UA was also negative for blood and protein. Additionally, the patient endorses taking Excedrin, nearly 2grams daily for 10 days. And also she takes losartan at home. Concern was KAN due to medication induced. Losartan was discontinued in the hospital and all nephrotoxic drugs have been avoided. Her creatinine trended down to 2.20 on day of admission. As per nephrology, she can be safely monitoried outpatient with close follow up. Currently, a renal biopsy is not warranted. Additionally during this hospitalization, CT scan of the chest revealed a 2 x 1.1 cm nodule within the inferior aspect of the left breast. Additionally, two adjacent nodules within the left lower lobe of the lung were also seen, along with patchy right upper lobe mixed solid and groundglass opacity. Additionally a 3mm left upper lobe peripheral pulmonary nodule was seen. This was discussed with the patient. These pulmonary nodules can be followed up with outpatient pulmonologist and the nodule in the left breast can also be followed up as an outpatient. The patient currently is hemodynamically stable, able to tolerate PO, ambulating without difficulty and has no complaints. Though no definitive diagnosis has been made on this admission, she was treated for bacterial pneumonia, she is ready for safe medical discharge home with follow up BMP within 5 days of discharge to monitor the creatinine. She should follow up closely with nephrology, rheumatology and primary care physician.

## 2019-09-23 NOTE — PROGRESS NOTE ADULT - PROBLEM SELECTOR PLAN 5
- Pt with diagnosis of iritis and anterior uveitis by ophthalmology on 9/17 and prescribed Prednisolone eye drops.  - Continue with Prednisolone 1% eye drops qid (prescription confirmed via ophthalmology office note in [facility]IE)  - Continued with cyclopentolate as per optho recs   - Opthalmology following, appreciate recommendations   - Positive seromarkers for SLE and Sjogren's (positive JULI and weakly positive dsDNA and scleroderma antibodies), concern for mixed connective tissue disorder. C3/C4, c-ANCA, p-ANCA, RF negative so far   - Rheumatology following, appreciate recs   - Pain control with Tylenol PRN for moderate pain and Oxycodone IR 5 mg q6hrs PRN for severe pain Stable. Hgb 10.2 on admission in setting of hemoptysis. Per pt, has h/o anemia and has been on folic acid supplements as a result.  Elevated Haptoglobin  - Monitor H/H and transfuse pRBCs to keep Hgb > 7  - C/w folic acid 1 mg daily  - Folate B12 wnl, iron, TIBC, ferritin wnl

## 2019-09-23 NOTE — DISCHARGE NOTE PROVIDER - CARE PROVIDER_API CALL
Javed Coyle  [address] [place] [address]  Phone: [phone]  Fax: (   )    -  Follow Up Time: 1 week Gilles Gibson)  Nephrology  [address], [address]nd [place] [address]  Phone: [phone]  Fax: [phone]  Follow Up Time:     Javed Coyle  [address] [place] [address]  Phone: [phone]  Fax: (   )    -  Follow Up Time: 1 week    Beronica Olivarez)  Internal Medicine  [address] [facility], [address]  [place] [address]  Phone: [phone]  Fax: [phone]  Follow Up Time:

## 2019-09-23 NOTE — PROGRESS NOTE ADULT - ATTENDING COMMENTS
Patient seen and examined. Agree with above note by resident.    # Hemoptysis with right upper lobe ground glass opacities with fevers, uveitis and mediastinal hilar lymph nodes concerning for underlying autoimmune process. At this time GPA, sarcoidosis, scleroderma and lupus are in consideration. No unifying diagnosis apparent at this time. Patient with elevated JULI, DsDNA, scleroderma antibodies. Given negative ANCA, vasculitis process less likely but could be atypical presentation. Rheum input appreciated. Will need close follow up as outpt. Explained to patient that we might not be able to give her a definitive diagnosis prior to discharge.     #CAP - patient with upper lobe opacities and hemoptysis. Possibly infectious etiology. Treat with 5 day course of ceftriaxone. Clinically improved however productive cough persists. No indication for bronch at this time per pulm. Leukocytosis resolved, patient is afebrile and clinically appears well. Explained to patient that repeat CT in 1-2 months is recommended to evaluate improvement.     #KAN - possibly underlying autoimmune process vs 2/2 nsaid use. Given renal function has stabilized, no indication for renal biopsy at this time. Case discussed with renal. Explained to patient that she will need close follow up with renal and in future if renal function worsens she may need renal biopsy. This workup can be performed as outpt.     #Uveitis - optho following. C/w eye drops. outpt follow up.     Above discussed with patient at length. Patient is requesting a family meeting with all consultants prior to DC. Meeting planned for 9/24 at 2pm.

## 2019-09-23 NOTE — PROGRESS NOTE ADULT - ASSESSMENT
50 yo  female, domiciled with family, employed by [employer], with history of HTN and migraine headaches (well controlled for past 2 years) presents with new onset of hemoptysis associated with N/V, fever and leukocytosis and recent diagnosis of iritis and anterior uveitis, concerning for pneumonia complicated by potential underlying autoimmune disorder and KAN s/p 5 days abx for step pneum CAP 48 yo  female, domiciled with family, employed by [employer], with history of HTN and migraine headaches (well controlled for past 2 years) presents with new onset of hemoptysis associated with N/V, fever and leukocytosis and recent diagnosis of iritis and anterior uveitis, concerning for pneumonia complicated by potential underlying autoimmune disorder and KAN s/p 5 days abx for step pneum CAP.

## 2019-09-23 NOTE — DISCHARGE NOTE NURSING/CASE MANAGEMENT/SOCIAL WORK - PATIENT PORTAL LINK FT
You can access the FollowMyHealth Patient Portal offered by [facility] by registering at the following website: [email or web address]. By joining [employer]s FollowMyHealth portal, you will also be able to view your health information using other applications (apps) compatible with our system.

## 2019-09-23 NOTE — PROGRESS NOTE ADULT - PROBLEM SELECTOR PLAN 4
Pt reports intermittent nausea, improved with PO intake. Unclear etiology   - Will continue PRN Zofran  - EKG repeated on 9/20/19, QTc: 446, no evidence of electrical alternas - Pt with diagnosis of iritis and anterior uveitis by ophthalmology on 9/17   - uveitis improving as per ophtho, c/w cyclopentolate and prednisone drops  - Positive seromarkers for SLE and Sjogren's (positive JULI and weakly positive dsDNA and scleroderma antibodies), concern for mixed connective tissue disorder. C3/C4, c-ANCA, p-ANCA, RF negative so far   - Rheumatology following, appreciate recs   - Pain control with Tylenol PRN for moderate pain and Oxycodone IR 5 mg q6hrs PRN for severe pain

## 2019-09-23 NOTE — PROGRESS NOTE ADULT - PROBLEM SELECTOR PLAN 7
BPs in acceptable range.   - Will hold pt's home losartan for now given KAN  - Will hold pt's home amlodipine for now given recent hemoptysis  - C/w labetalol 100 mg bid with hold parameters - Incidental left breast 2 x 1.1 cm nodule found on CT chest, r/o malignancy. Per pt, she had a biopsy 2 years ago, follows with outpatient obgyn    - Recommend outpatient follow up for mammography and ultrasound

## 2019-09-23 NOTE — DISCHARGE NOTE PROVIDER - NSFOLLOWUPCLINICS_GEN_ALL_ED_FT
[facility]  Nephrology  [address] [facility] [address] [facility] [facility], [address]nd [place] [address]  Phone: [phone]  Fax:   Follow Up Time: 4-6 Days    [facility]  Rheumatology  [address] [place] [address] [address]  [place] [address]  Phone: [phone]  Fax:   Follow Up Time: Routine [facility]  Nephrology  [address] [facility], [address]nd [place] [address]  Phone: [phone]  Fax:   Follow Up Time: 4-6 Days    [facility] Pulmonolgy and [facility]  Pulmonology  [address], [address]  [place] [address]  Phone: [phone]  Fax:   Follow Up Time: Routine    [facility] Rhe[facility]  Rheumatology  [address] [place] [address] [address]  [place] [address]  Phone: [phone]  Fax:   Follow Up Time: Routine

## 2019-09-23 NOTE — PROGRESS NOTE ADULT - ATTENDING COMMENTS
49F w/ HTN and migraines. Presents with hemoptysis found to have anterior uveitis and KAN. Of note, pt was taking excedrin ATC for several days prior to admission. Imaging shows axillary, medistinal and hilar LAD and RUL opacity, elevated ESR and CRP w/ serologies are positive for elevated JULI, +scleroderma ab, borderline elevated dsDNA. SHe has been ruled out for TB. Reports mild and intermittent blood tinged sputum only when she wakes up in the morning.     - no unifying dx at this time to explain all these findings- ddx sarcoidosis, SLE, other undifferentiated CTD  - currently being treated for CAP with RUL opacity on CT chest   - she is not hypoxemic, afebrile and well appearing  - would have repeat CT chest in 6-8 weeks to evaluate for improvement of current CT findings and have pulmonary follow-up as outpt with possible bronchoscopy at that time  - remainder of workup for KAN as per rheum/renal

## 2019-09-23 NOTE — DISCHARGE NOTE PROVIDER - NSDCCPCAREPLAN_GEN_ALL_CORE_FT
PRINCIPAL DISCHARGE DIAGNOSIS  Diagnosis: CAP (community acquired pneumonia)  Assessment and Plan of Treatment: You were hospitalized because you had three episodes of hemoptysis and blood in your sputum. Throughout this hosptialization, we have conducted studies on your blood and take images of your lungs and determined that you have a bacterial pneumonia. Pneumonia is a clinical diagnosis, and you were treated with IV ceftriaxone for 5 days. It seems that after your treatment, you seemed clinically better. Without any more fevers, nausea, general malaise and coughing. Additionally it seems that you were also breathing better, without any chest pain while taking a large breath. Pneumonia can occur in any one, but there are precautions to help prevent this lung infection, including good hand hygiene and wearing a mask when you are sick. We are confident that we adequately treated your pneumonia. Please follow up with your primary care physician after this visit within 1 week. Should you have the same feelings again, including fevers, chills, chest pain, shortness of breath, difficulty breathing, body aches, please return to the emergency room. You have been adequately treated and therefore we are not sending you home with any more antibiotics.      SECONDARY DISCHARGE DIAGNOSES  Diagnosis: Anterior uveitis  Assessment and Plan of Treatment: You were diagnosed with anterior uveitis, and have been treated with eye drops. It seems that your eye symptoms have been improving and now resolved on discharge. Ophthalmology team has been seeing you consistently during your stay and they believe the anterior uveitis is secondary to an underlying autoimmune etiology. As per their recommendations, please continue with the eye drops as well as seeing them as an oupatient. Additionally, please return to the hospital if you have blurry vision, changes in vision or unusual headaches.    Diagnosis: Hemoptysis  Assessment and Plan of Treatment: You were initially admitted in the hospital for hemoptysis. At that point, we were concerned for infectious etiology or rheumatologic etiology since you have a strong family history. From an infectious standpoint, tuberculosis has been ruled out with quatiferon test and AFB sputum. Blood culture and urine analysis were negative for infection. Urine culture is also negative. It seems that sputum culture was positive for bacteria. This type of bacteria is very typical for community acquired pneumonia and that was discussed in the previous paragraph. In terms of the rheumatologic etiology, it seems that you were positive for JULI, DSDNA, scleroderma, but negative for ANCA, Anti SSA, SSB antibody and Anti Smith and RNP antibody. Though we have no definitive diagnosis currently, we feel confident that it is safe for you to follow up outpatient with the rest of the rheumatologic work up. Please return to the hospital if you have difficulty breathing, shortness of breath, hemoptysis, dizziness, chest pain or loss of consciousness. Please take all your medications as prescribed.    Diagnosis: KAN (acute kidney injury)  Assessment and Plan of Treatment: During this hospitalization we have been monitoring your kidney function. It seems that your serum creatinine was baseline at 1.1. During this hospitalization it seems that that has increased. The nephrology team has been on board the entirety of your stay. There are several reasons to explain why creatinine would increase. This includes issues with the kidney itself. It seems that you have taken 2grams of aspirin every day for 10 days. This could cause disruption in your kidney functions. Additionally, autoimmune, rheumatologic diseases could also cause a nonspecific signs of kidney disfunction. We did not believe this was an obstructive pattern because we conducted an ultrasound of the bladder and kidneys and did not find signs of hydronephrosis or retention in the bladder. On the day of your discharge your creatinine is trending downward, currently at 2.20. We feel very strongly about you returning to your primary care physician within 5 days so that they can check your BMP, which would tell us your electrolyte status as well as your creatinine levels. At this point, we, along with the nephrology team do not feel strongly about a renal biopsy since it may do more harm. But in the future, if your kidney function declines, that may be a test we would conduct. During this stay we stopped your Losartan, a blood pressure medication because this could cause damage to the kidney. Please DO NOT continue this medication at home. Additionally please discuss this with your primary care physician. Should you have difficulty urinating, increased frequency in urinating, shortness of breath, altered mentation, chest pain or any symptoms of malaise, please return to the hospital. We also advise to stop any medications that would cause kidney dysfunction, including aspirin and NSAIDs. As always please take your medications as prescribed and speak with a physician before starting new medications or supplementations. PRINCIPAL DISCHARGE DIAGNOSIS  Diagnosis: CAP (community acquired pneumonia)  Assessment and Plan of Treatment: You were hospitalized because you had three episodes of hemoptysis and blood in your sputum. Throughout this hosptialization, we have conducted studies on your blood and take images of your lungs and determined that you have a bacterial pneumonia. Pneumonia is a clinical diagnosis, and you were treated with IV ceftriaxone for 5 days. It seems that after your treatment, you seemed clinically better. Without any more fevers, nausea, general malaise and coughing. Additionally it seems that you were also breathing better, without any chest pain while taking a large breath. Pneumonia can occur in any one, but there are precautions to help prevent this lung infection, including good hand hygiene and wearing a mask when you are sick. We are confident that we adequately treated your pneumonia. Please follow up with your primary care physician after this visit within 1 week. Should you have the same feelings again, including fevers, chills, chest pain, shortness of breath, difficulty breathing, body aches, please return to the emergency room. You have been adequately treated and therefore we are not sending you home with any more antibiotics.      SECONDARY DISCHARGE DIAGNOSES  Diagnosis: Pulmonary nodule  Assessment and Plan of Treatment: On CT scan of your chest, there was evidence of 3 tiny left lung pulmonary nodules. These nodules could be secondary to lymph nodes from an acute lung infection, like the one you just had, or can be secondary to an autoimmune etiology. But it is important to follow up in 1-3 months in order to exclude primary lung malignancy. Please follow up with your primary care physician on a regular basis and discuss these findings with this person after you are discharged from the hospital.    Diagnosis: Breast mass  Assessment and Plan of Treatment: During this hospitalization, you recieved a CT scan of your chest. An incidental finding of a 2 x 1.1 cm left breast nodule was found. This nodule can represent a mass that is benign or malignant, and we would like you to follow up with your primary care physician for a full work up.    Diagnosis: Anterior uveitis  Assessment and Plan of Treatment: You were diagnosed with anterior uveitis, and have been treated with eye drops. It seems that your eye symptoms have been improving and now resolved on discharge. Ophthalmology team has been seeing you consistently during your stay and they believe the anterior uveitis is secondary to an underlying autoimmune etiology. As per their recommendations, please continue with the eye drops as well as seeing them as an oupatient. Additionally, please return to the hospital if you have blurry vision, changes in vision or unusual headaches.    Diagnosis: Hemoptysis  Assessment and Plan of Treatment: You were initially admitted in the hospital for hemoptysis. At that point, we were concerned for infectious etiology or rheumatologic etiology since you have a strong family history. From an infectious standpoint, tuberculosis has been ruled out with quatiferon test and AFB sputum. Blood culture and urine analysis were negative for infection. Urine culture is also negative. It seems that sputum culture was positive for bacteria. This type of bacteria is very typical for community acquired pneumonia and that was discussed in the previous paragraph. In terms of the rheumatologic etiology, it seems that you were positive for JULI, DSDNA, scleroderma, but negative for ANCA, Anti SSA, SSB antibody and Anti Smith and RNP antibody. Though we have no definitive diagnosis currently, we feel confident that it is safe for you to follow up outpatient with the rest of the rheumatologic work up. Please return to the hospital if you have difficulty breathing, shortness of breath, hemoptysis, dizziness, chest pain or loss of consciousness. Please take all your medications as prescribed.    Diagnosis: KAN (acute kidney injury)  Assessment and Plan of Treatment: During this hospitalization we have been monitoring your kidney function. It seems that your serum creatinine was baseline at 1.1. During this hospitalization it seems that that has increased. The nephrology team has been on board the entirety of your stay. There are several reasons to explain why creatinine would increase. This includes issues with the kidney itself. It seems that you have taken 2grams of aspirin every day for 10 days. This could cause disruption in your kidney functions. Additionally, autoimmune, rheumatologic diseases could also cause a nonspecific signs of kidney disfunction. We did not believe this was an obstructive pattern because we conducted an ultrasound of the bladder and kidneys and did not find signs of hydronephrosis or retention in the bladder. On the day of your discharge your creatinine is trending downward, currently at 2.20. We feel very strongly about you returning to your primary care physician within 5 days so that they can check your BMP, which would tell us your electrolyte status as well as your creatinine levels. At this point, we, along with the nephrology team do not feel strongly about a renal biopsy since it may do more harm. But in the future, if your kidney function declines, that may be a test we would conduct. During this stay we stopped your Losartan, a blood pressure medication because this could cause damage to the kidney. Please DO NOT continue this medication at home. Additionally please discuss this with your primary care physician. Should you have difficulty urinating, increased frequency in urinating, shortness of breath, altered mentation, chest pain or any symptoms of malaise, please return to the hospital. We also advise to stop any medications that would cause kidney dysfunction, including aspirin and NSAIDs. As always please take your medications as prescribed and speak with a physician before starting new medications or supplementations. PRINCIPAL DISCHARGE DIAGNOSIS  Diagnosis: CAP (community acquired pneumonia)  Assessment and Plan of Treatment: You were hospitalized because you had three episodes of hemoptysis and blood in your sputum. Throughout this hosptialization, we have conducted studies on your blood and take images of your lungs and determined that you have a bacterial pneumonia. Pneumonia is a clinical diagnosis, and you were treated with IV ceftriaxone for 5 days. It seems that after your treatment, you seemed clinically better. Without any more fevers, nausea, general malaise and coughing. Additionally it seems that you were also breathing better, without any chest pain while taking a large breath. Pneumonia can occur in any one, but there are precautions to help prevent this lung infection, including good hand hygiene and wearing a mask when you are sick. We are confident that we adequately treated your pneumonia. Please follow up with your primary care physician after this visit within 1 week. Should you have the same feelings again, including fevers, chills, chest pain, shortness of breath, difficulty breathing, body aches, please return to the emergency room. You have been adequately treated and therefore we are not sending you home with any more antibiotics. You will need a repeat CT in 3 months to assess for resolution.      SECONDARY DISCHARGE DIAGNOSES  Diagnosis: Pulmonary nodule  Assessment and Plan of Treatment: On CT scan of your chest, there was evidence of 3 tiny left lung pulmonary nodules. These nodules could be secondary to lymph nodes from an acute lung infection, like the one you just had, or can be secondary to an autoimmune etiology. But it is important to follow up in 1-3 months in order to better characterize the nodules in the setting of no infected state. Please follow up with your primary care physician on a regular basis and discuss these findings with this person after you are discharged from the hospital.    Diagnosis: Breast mass  Assessment and Plan of Treatment: During this hospitalization, you recieved a CT scan of your chest. An incidental finding of a 2 x 1.1 cm left breast nodule was found. This nodule can represent a mass that is benign or malignant, and we would like you to follow up with your primary care physician for a full work up.    Diagnosis: Anterior uveitis  Assessment and Plan of Treatment: You were diagnosed with anterior uveitis, and have been treated with eye drops. It seems that your eye symptoms have been improving and now resolved on discharge. Ophthalmology team has been seeing you consistently during your stay and they believe the anterior uveitis is secondary to an underlying autoimmune etiology. As per their recommendations, please continue with the eye drops as well as seeing them as an oupatient within 1 week. Additionally, please return to the hospital if you have blurry vision, changes in vision or unusual headaches.    Diagnosis: Hemoptysis  Assessment and Plan of Treatment: You were initially admitted in the hospital for hemoptysis. At that point, we were concerned for infectious etiology or rheumatologic etiology since you have a strong family history. From an infectious standpoint, tuberculosis has been ruled out with quatiferon test and AFB sputum. Blood culture and urine analysis were negative for infection. Urine culture is also negative. It seems that sputum culture was positive for bacteria. This type of bacteria is very typical for community acquired pneumonia and that was discussed in the previous paragraph. In terms of the rheumatologic etiology, it seems that you were positive for JULI, DSDNA, scleroderma, but negative for ANCA, Anti SSA, SSB antibody and Anti Smith and RNP antibody. Though we have no definitive diagnosis currently, we feel confident that it is safe for you to follow up outpatient with the rest of the rheumatologic work up. Please return to the hospital if you have difficulty breathing, shortness of breath, hemoptysis, dizziness, chest pain or loss of consciousness. Please take all your medications as prescribed.    Diagnosis: KAN (acute kidney injury)  Assessment and Plan of Treatment: During this hospitalization we have been monitoring your kidney function. It seems that your serum creatinine was baseline at 1.1. During this hospitalization it seems that that has increased. The nephrology team has been on board the entirety of your stay. There are several reasons to explain why creatinine would increase. It seems that you have taken 2grams of aspirin every day for 10 days. This could cause disruption in your kidney functions. Additionally, autoimmune, rheumatologic diseases could also cause a nonspecific signs of kidney disfunction. On the day of your discharge your creatinine is trending downward, currently at 2.20. We feel very strongly about you returning to your primary care physician within 5 days so that they can check your BMP, which would tell us your electrolyte status as well as your creatinine levels. At this point, we, along with the nephrology team do not feel strongly about a renal biopsy since it may do more harm. But in the future, if your kidney function declines, that may be a test we would conduct. During this stay we stopped your Losartan, a blood pressure medication because this could cause damage to the kidney. Please DO NOT continue this medication at home. Additionally please discuss this with your primary care physician. Should you have difficulty urinating, increased frequency in urinating, shortness of breath, altered mentation, chest pain or any symptoms of malaise, please return to the hospital. We also advise to stop any medications that would cause kidney dysfunction, including aspirin and NSAIDs including IBU, Motrin, or Alleve. As always please take your medications as prescribed and speak with a physician before starting new medications or supplementations. Please obtain a BMP to assess your kidney function within 5 days.  Please go to your scheduled appt with Dr. Coyle on 9/30 at 10:20 AM.

## 2019-09-23 NOTE — DISCHARGE NOTE PROVIDER - PROVIDER TOKENS
FREE:[LAST:[Leonides],FIRST:[Javed],PHONE:[phone]],FAX:[(   )    -],ADDRESS:[[address] [place] [address]],FOLLOWUP:[1 week]] PROVIDER:[TOKEN:[[id number]]],FREE:[LAST:[Leonides],FIRST:[Javed],PHONE:[phone]],FAX:[(   )    -],ADDRESS:[[address] [place] [address]],FOLLOWUP:[1 week]],PROVIDER:[TOKEN:[[id number]]]

## 2019-09-23 NOTE — PROGRESS NOTE ADULT - PROBLEM SELECTOR PLAN 1
- Improved. Likely secondary to CAP pneumonia, s/p IV abx x 5 days  Sputum cx strep pnuemo  ESR 98 and CRP 29.7.   - Given presence of fevers, chills, and night sweats along with hemoptysis, AFB sputum smear/culture x3. HIV non-reactive   - Quant gold negative   - CXR clear  - CT of chest w/o contrast significant for right upper lobe mixed solid and groundglass patchy groundglass opacity likely pneumonia. Recommended 1 to 3 month follow-up chest CT to ensure resolution and exclude primary lung neoplasm.  - As pt recently diagnosed with iritis and anterior uveitis, will r/o hemoptysis secondary to autoimmune disorder that has both ocular and pulmonary manifestations   - Rheumatology following, appreciate recommendations and following- DNA III dejon and serum lysosome added   - Pulmonology following, will defer bronchoscopy at this time, appreciate recommendations - Improved. Likely secondary to CAP pneumonia, s/p IV abx x 5 days  Sputum cx strep pnuemo  - AFB sputum smear/culture x3 negative, Quant gold negative  - HIV nonreactive  - CXR clear  - CT of chest w/o contrast significant for right upper lobe mixed solid and groundglass patchy groundglass opacity likely pneumonia. Recommended 1 to 3 month follow-up chest CT to ensure resolution and exclude primary lung neoplasm.  - High suspicion for autoimmune etiology given anterior uveitis  - Rheumatology labs have not resulted yet, pt ok to follow up outpatient with results

## 2019-09-23 NOTE — DISCHARGE NOTE PROVIDER - NSDCFUSCHEDAPPT_GEN_ALL_CORE_FT
SHERLYN BAZZI ; 10/29/2019 ; [facility] SHERLYN BAZZI ; 10/29/2019 ; [facility] SHERLYN BAZZI ; 10/29/2019 ; [facility] SHERLYN BAZZI ; 10/29/2019 ; [facility]

## 2019-09-23 NOTE — PROGRESS NOTE ADULT - ASSESSMENT
48 yo woman with history of HTN and migraine headaches, recently diagnosed with iritis/anterior uveitis, with FH of sarcoid in daughter, currently sent to ED for hemoptysis, found with KAN, anemia, protein gap, CT scan showing ground glass opacities with hilar/mediastinal LAD, now with +JULI, +DSDNA, + scleroderma antibodies, consistent with possible mixed connective tissue disorder.  CT findings are not typical for scleroderma and this should not cause hemoptysis, worsening KAN could be SRC, pt is normotensive which can be seen in 10% of population, however at this time, KAN more likely to be 2/2 ASA use.  SLE can cause DAH which leads to hemopytsis, however CT findings are not showing this.  Sarcoidosis is another possible differential vs malignancy.  Etiology of constellation of symptoms remains unclear at this time.    #Hemoptysis  - f/u autoimmune work-up: so far +JULI, +DsDNA, +scleroderma ABs, negative p-ANCA, negative c-ANCA, negative sjogren, negative anti-MPO, negative anti-PR3, f/u anti GBMs, f/u CCP, f/u IZZY  - SPEP, UPEP WNL, f/u serum immunofixation  - Quant TB and 3X AFB negative, HIV is negative  - CT chest given hemoptysis showing ground glass opacities, possible superimposed PNA, hilar and mediastinal adenopathy, left breast nodule  - elevated urine protein and creatinine  - low 25 vit D, normal 1,25 vit D, ACE wnl  - pulmonology deferring bronch at this time    #Uveitis  - continue prednisolone drops  - would pursue infectious workup for uveitis    #Renal failure  - worsening, unclear the chronicity of renal disease   - currently in the setting of connective tissue disorder; could raise concern for scleroderma renal crisis  - could also be 2/2 high doses ASA use as patient was taking excedrin x 10 days  - agree with renal consult; may consider biopsy if renal function continues to worsen. Avoiding ACEi/ARB at this point as this could be ATN/AIN 2/2 ASA  - will hold off on steroids at this time as it can worsen SRC, however lower on differential for KAN at this time.    #anemia  - iron and ferritin WNL

## 2019-09-23 NOTE — DISCHARGE NOTE PROVIDER - NSDCFUADDINST_GEN_ALL_CORE_FT
Please follow with primary care physician and obtain BMP to assess creatinine within 5 days of discharge.

## 2019-09-23 NOTE — PROGRESS NOTE ADULT - ATTENDING COMMENTS
I have reviewed the residents note including the history, exam, assessment, and plan.  I agree with the residents assessment and plan.    Pt asymptomatic and eye is W and Q OS  Pt requires f/u as outpatient with Dr. Jose Nunn MD

## 2019-09-23 NOTE — PROGRESS NOTE ADULT - PROBLEM SELECTOR PLAN 3
- Sputum culture, gram + cocci in pairs.   +SEPSIS, now resolved  - CT of chest concerning for pneumonia RUL  - Pt received IV Ceftriaxone and IV Azithromycin for empiric treatment for CAP for 5 day course - Sputum culture, gram + cocci in pairs.   - CT of chest concerning for pneumonia RUL  - Pt received IV Ceftriaxone and IV Azithromycin for empiric treatment for CAP for 5 day course  - asymptomatic at this point

## 2019-09-23 NOTE — PROGRESS NOTE ADULT - SUBJECTIVE AND OBJECTIVE BOX
[facility] DIVISION OF KIDNEY DISEASES AND HYPERTENSION -- FOLLOW UP NOTE  --------------------------------------------------------------------------------  HPI: 50 yo F with HTN and migraine headaches is admitted with hemoptysis and elevated Scr. Pt. is currently being worked up for infectious and rheumatologic causes for hemoptysis. Nephrology team is following for elevated Scr. Pt. denies history of kidney disease. Pt. states that she was taking Excedrin (containing 250 mg of ASA per pill) x2 pills every 6 hours for migraines for 10 days prior to hospitalization Pt. takes Losartan at home, but has lost refills and has not taken it for several days. No labs prior to September 2019 for review in [facility]. Pt. had visited the ER on 9/12/19 and was noted to have a normal Scr of 1.1. On current admission (9/17/19) pt. found to have an elevated Scr of 1.80, which improved to 1.65 on 9/18/19, before uptrending to 2.09 on 9/21/19. Scr elevated/stable today at 2.06. UA negative for blood and protein. Renal US on 9/20/19 with no masses, calculi, or hydronephrosis. Serologic testing positive for JULI, borderline ds-DNA, and scleroderma ab.    Pt. seen and examined at bedside this AM. Pt. states that she feels well. Denies dysuria. Denies CP, SOB, N/V/F/C.    PAST HISTORY  --------------------------------------------------------------------------------  No significant changes to PMH, PSH, FHx, SHx, unless otherwise noted    ALLERGIES & MEDICATIONS  --------------------------------------------------------------------------------  Allergies    No Known Allergies    Intolerances    Standing Inpatient Medications  cholecalciferol 400 Unit(s) Oral daily  cyclopentolate 0.5% Solution 1 Drop(s) Both EYES three times a day  docusate sodium 100 milliGRAM(s) Oral three times a day  folic acid 1 milliGRAM(s) Oral daily  influenza   Vaccine 0.5 milliLiter(s) IntraMuscular once  labetalol 100 milliGRAM(s) Oral two times a day  prednisoLONE acetate 1% Suspension 1 Drop(s) Both EYES four times a day  senna 2 Tablet(s) Oral at bedtime    REVIEW OF SYSTEMS  --------------------------------------------------------------------------------  Gen: No lethargy  Respiratory: No dyspnea  CV: No chest pain  GI: No abdominal pain  MSK: No LE edema  Neuro: No dizziness  Heme: No bleeding    All other systems were reviewed and are negative, except as noted.    VITALS/PHYSICAL EXAM  --------------------------------------------------------------------------------  T(C): 36.7 (09-23-19 @ 05:57), Max: 37.1 (09-22-19 @ 15:32)  HR: 80 (09-23-19 @ 05:57) (80 - 87)  BP: 135/82 (09-23-19 @ 05:57) (132/71 - 144/85)  RR: 17 (09-23-19 @ 05:57) (17 - 17)  SpO2: 97% (09-23-19 @ 05:57) (96% - 100%)  Wt(kg): --    Physical Exam:  	Gen: NAD, well-appearing  	HEENT: Supple neck   	Pulm: CTA B/L  	CV: RRR, S1S2; no rub  	Abd: +BS, soft, nontender/nondistended  	: No suprapubic tenderness  	LE: No edema b/l  	Skin: Warm, without rashes  	Psych: Normal affect    LABS/STUDIES  --------------------------------------------------------------------------------              9.5    8.85  >-----------<  320      [09-22-19 @ 07:00]              30.1     134  |  98  |  12  ----------------------------<  124      [09-23-19 @ 07:35]  4.0   |  23  |  2.06        Ca     8.9     [09-23-19 @ 07:35]      Mg     2.3     [09-22-19 @ 07:00]      Phos  4.0     [09-22-19 @ 07:00]    Creatinine Trend:  SCr 2.06 [09-23 @ 07:35]  SCr 2.06 [09-22 @ 07:00]  SCr 2.02 [09-21 @ 06:10]  SCr 2.09 [09-20 @ 07:30]  SCr 1.91 [09-19 @ 07:15]

## 2019-09-23 NOTE — PROGRESS NOTE ADULT - PROBLEM SELECTOR PLAN 9
- DVT ppx: Will continue to hold pharmacologic ppx given recent hemoptysis  - Diet: Regular diet, no plans for procedure  - Dispo: to home
- DVT ppx: Will continue to hold pharmacologic ppx given recent hemoptysis  - Diet: Regular diet, no plans for procedure

## 2019-09-23 NOTE — PROGRESS NOTE ADULT - PROBLEM SELECTOR PLAN 8
Incidental left breast 2 x 1.1 cm nodule found on CT chest, r/o malignancy. Per pt, she had a biopsy 2 years ago, follows with outpatient obgyn    - Recommend outpatient follow up for mammography and ultrasound - DVT ppx: Improve score of 0  - Diet: Regular diet, no plans for procedure  - Dispo: to home

## 2019-09-24 LAB
ANION GAP SERPL CALC-SCNC: 15 MMO/L — HIGH (ref 7–14)
APPEARANCE UR: CLEAR — SIGNIFICANT CHANGE UP
BILIRUB UR-MCNC: NEGATIVE — SIGNIFICANT CHANGE UP
BLOOD UR QL VISUAL: NEGATIVE — SIGNIFICANT CHANGE UP
BUN SERPL-MCNC: 15 MG/DL — SIGNIFICANT CHANGE UP (ref 7–23)
CALCIUM SERPL-MCNC: 9.6 MG/DL — SIGNIFICANT CHANGE UP (ref 8.4–10.5)
CHLORIDE SERPL-SCNC: 100 MMOL/L — SIGNIFICANT CHANGE UP (ref 98–107)
CO2 SERPL-SCNC: 23 MMOL/L — SIGNIFICANT CHANGE UP (ref 22–31)
COLOR SPEC: SIGNIFICANT CHANGE UP
CREAT SERPL-MCNC: 2.26 MG/DL — HIGH (ref 0.5–1.3)
GLUCOSE SERPL-MCNC: 96 MG/DL — SIGNIFICANT CHANGE UP (ref 70–99)
GLUCOSE UR-MCNC: NEGATIVE — SIGNIFICANT CHANGE UP
HCT VFR BLD CALC: 30.9 % — LOW (ref 34.5–45)
HGB BLD-MCNC: 9.6 G/DL — LOW (ref 11.5–15.5)
KETONES UR-MCNC: NEGATIVE — SIGNIFICANT CHANGE UP
LEUKOCYTE ESTERASE UR-ACNC: NEGATIVE — SIGNIFICANT CHANGE UP
MAGNESIUM SERPL-MCNC: 2.3 MG/DL — SIGNIFICANT CHANGE UP (ref 1.6–2.6)
MCHC RBC-ENTMCNC: 26.7 PG — LOW (ref 27–34)
MCHC RBC-ENTMCNC: 31.1 % — LOW (ref 32–36)
MCV RBC AUTO: 86.1 FL — SIGNIFICANT CHANGE UP (ref 80–100)
NITRITE UR-MCNC: NEGATIVE — SIGNIFICANT CHANGE UP
NRBC # FLD: 0 K/UL — SIGNIFICANT CHANGE UP (ref 0–0)
PH UR: 6.5 — SIGNIFICANT CHANGE UP (ref 5–8)
PHOSPHATE SERPL-MCNC: 4.1 MG/DL — SIGNIFICANT CHANGE UP (ref 2.5–4.5)
PLATELET # BLD AUTO: 309 K/UL — SIGNIFICANT CHANGE UP (ref 150–400)
PMV BLD: 10.5 FL — SIGNIFICANT CHANGE UP (ref 7–13)
POTASSIUM SERPL-MCNC: 4.2 MMOL/L — SIGNIFICANT CHANGE UP (ref 3.5–5.3)
POTASSIUM SERPL-SCNC: 4.2 MMOL/L — SIGNIFICANT CHANGE UP (ref 3.5–5.3)
PROT UR-MCNC: 10 — SIGNIFICANT CHANGE UP
RBC # BLD: 3.59 M/UL — LOW (ref 3.8–5.2)
RBC # FLD: 14.1 % — SIGNIFICANT CHANGE UP (ref 10.3–14.5)
SODIUM SERPL-SCNC: 138 MMOL/L — SIGNIFICANT CHANGE UP (ref 135–145)
SP GR SPEC: 1.01 — SIGNIFICANT CHANGE UP (ref 1–1.04)
UROBILINOGEN FLD QL: NORMAL — SIGNIFICANT CHANGE UP
WBC # BLD: 8.11 K/UL — SIGNIFICANT CHANGE UP (ref 3.8–10.5)
WBC # FLD AUTO: 8.11 K/UL — SIGNIFICANT CHANGE UP (ref 3.8–10.5)

## 2019-09-24 PROCEDURE — 99233 SBSQ HOSP IP/OBS HIGH 50: CPT | Mod: GC

## 2019-09-24 PROCEDURE — 99232 SBSQ HOSP IP/OBS MODERATE 35: CPT | Mod: GC

## 2019-09-24 PROCEDURE — 99232 SBSQ HOSP IP/OBS MODERATE 35: CPT

## 2019-09-24 RX ORDER — SODIUM CHLORIDE 0.65 %
1 AEROSOL, SPRAY (ML) NASAL DAILY
Refills: 0 | Status: DISCONTINUED | OUTPATIENT
Start: 2019-09-24 | End: 2019-09-25

## 2019-09-24 RX ORDER — CYCLOPENTOLATE HYDROCHLORIDE 10 MG/ML
1 SOLUTION/ DROPS OPHTHALMIC THREE TIMES A DAY
Refills: 0 | Status: DISCONTINUED | OUTPATIENT
Start: 2019-09-24 | End: 2019-09-25

## 2019-09-24 RX ORDER — PREDNISOLONE SODIUM PHOSPHATE 1 %
1 DROPS OPHTHALMIC (EYE)
Refills: 0 | Status: DISCONTINUED | OUTPATIENT
Start: 2019-09-24 | End: 2019-09-25

## 2019-09-24 RX ADMIN — Medication 1 MILLIGRAM(S): at 12:37

## 2019-09-24 RX ADMIN — Medication 650 MILLIGRAM(S): at 20:15

## 2019-09-24 RX ADMIN — Medication 100 MILLIGRAM(S): at 07:00

## 2019-09-24 RX ADMIN — CYCLOPENTOLATE HYDROCHLORIDE 1 DROP(S): 10 SOLUTION/ DROPS OPHTHALMIC at 16:28

## 2019-09-24 RX ADMIN — OXYCODONE HYDROCHLORIDE 5 MILLIGRAM(S): 5 TABLET ORAL at 23:23

## 2019-09-24 RX ADMIN — Medication 400 UNIT(S): at 12:33

## 2019-09-24 RX ADMIN — Medication 1 DROP(S): at 07:14

## 2019-09-24 RX ADMIN — CYCLOPENTOLATE HYDROCHLORIDE 1 DROP(S): 10 SOLUTION/ DROPS OPHTHALMIC at 07:14

## 2019-09-24 RX ADMIN — Medication 650 MILLIGRAM(S): at 07:01

## 2019-09-24 RX ADMIN — Medication 650 MILLIGRAM(S): at 19:17

## 2019-09-24 RX ADMIN — PANTOPRAZOLE SODIUM 40 MILLIGRAM(S): 20 TABLET, DELAYED RELEASE ORAL at 23:23

## 2019-09-24 RX ADMIN — Medication 1 DROP(S): at 12:32

## 2019-09-24 RX ADMIN — Medication 650 MILLIGRAM(S): at 07:44

## 2019-09-24 RX ADMIN — Medication 100 MILLIGRAM(S): at 19:10

## 2019-09-24 RX ADMIN — OXYCODONE HYDROCHLORIDE 5 MILLIGRAM(S): 5 TABLET ORAL at 17:08

## 2019-09-24 RX ADMIN — OXYCODONE HYDROCHLORIDE 5 MILLIGRAM(S): 5 TABLET ORAL at 16:25

## 2019-09-24 RX ADMIN — CYCLOPENTOLATE HYDROCHLORIDE 1 DROP(S): 10 SOLUTION/ DROPS OPHTHALMIC at 23:23

## 2019-09-24 RX ADMIN — Medication 30 MILLILITER(S): at 16:25

## 2019-09-24 RX ADMIN — Medication 1 DROP(S): at 19:10

## 2019-09-24 RX ADMIN — Medication 1 DROP(S): at 23:23

## 2019-09-24 NOTE — PROGRESS NOTE ADULT - ASSESSMENT
50 yo woman with history of HTN and migraine headaches, recently diagnosed with iritis/anterior uveitis, with FH of sarcoid in daughter, currently sent to ED for hemoptysis, found with KAN, anemia, protein gap, CT scan showing ground glass opacities with hilar/mediastinal LAD, now with +JULI, +DSDNA, + scleroderma antibodies, consistent with possible mixed connective tissue disorder.  CT findings are not typical for scleroderma and this should not cause hemoptysis, worsening KAN could be SRC, pt is normotensive which can be seen in 10% of population, however at this time, KAN more likely to be 2/2 ASA use.    SLE can cause DAH which leads to hemopytsis, however CT findings are not showing this.  Sarcoidosis is another possible differential vs malignancy.  Etiology of constellation of symptoms remains unclear at this time.    #Hemoptysis  - f/u autoimmune work-up: so far +JULI, +DsDNA, +scleroderma ABs, negative p-ANCA, negative c-ANCA, negative sjogren, negative anti-MPO, negative anti-PR3, negative GBMs, negative IZZY, f/u CCP  - SPEP, UPEP WNL, f/u serum immunofixation  - Quant TB and 3X AFB negative, HIV is negative  - CT chest given hemoptysis showing ground glass opacities, possible superimposed PNA, hilar and mediastinal adenopathy, left breast nodule  - elevated urine protein and creatinine  - low 25 vit D, normal 1,25 vit D, ACE wnl  - pulmonology deferring bronch at this time    #Uveitis  - continue prednisolone drops  - Patient is HLAB27 +  - rest of care per ophtho and primary team    #Renal failure  - worsening, unclear the chronicity of renal disease   - currently in the setting of connective tissue disorder; could raise concern for scleroderma renal crisis  - could also be 2/2 high doses ASA use as patient was taking excedrin x 10 days  - agree with renal consult; may consider biopsy if renal function continues to worsen. Avoiding ACEi/ARB at this point as this could be ATN/AIN 2/2 ASA  - will hold off on steroids at this time as it can worsen SRC, however lower on differential for KAN at this time.    #anemia  - iron and ferritin WNL 50 yo woman with history of HTN and migraine headaches, recently diagnosed with iritis/anterior uveitis, with FH of sarcoid in daughter, currently sent to ED for hemoptysis, found with KAN, anemia, protein gap, CT scan showing ground glass opacities with hilar/mediastinal LAD, now with +JULI, +DSDNA, + scleroderma antibodies, consistent with possible mixed connective tissue disorder.  CT findings are not typical for scleroderma and this should not cause hemoptysis, worsening KAN could be SRC, pt is normotensive which can be seen in 10% of population, however at this time, KAN more likely to be 2/2 ASA use.    SLE can cause DAH which leads to hemopytsis, however CT findings are not showing this.  Sarcoidosis is another possible differential vs malignancy.  Etiology of constellation of symptoms remains unclear at this time.    #Hemoptysis  - f/u autoimmune work-up: so far +JULI, +DsDNA, +scleroderma ABs, negative p-ANCA, negative c-ANCA, negative sjogren, negative anti-MPO, negative anti-PR3, negative GBMs, negative IZZY, f/u CCP  - SPEP, UPEP WNL, f/u serum immunofixation  - Quant TB and 3X AFB negative, HIV is negative  - CT chest given hemoptysis showing ground glass opacities, possible superimposed PNA, hilar and mediastinal adenopathy, left breast nodule  - elevated urine protein and creatinine  - low 25 vit D, normal 1,25 vit D, ACE wnl  - pulmonology deferring bronch at this time    #Uveitis  - continue prednisolone drops  - Patient is HLAB27 +  - rest of care per ophtho and primary team    #Renal failure  - worsening, unclear the chronicity of renal disease   - currently in the setting of connective tissue disorder; could raise concern for scleroderma renal crisis  - could also be 2/2 high doses ASA use as patient was taking excedrin x 10 days  - agree with renal consult; may consider biopsy if renal function continues to worsen. Avoiding ACEi/ARB at this point as this could be ATN/AIN 2/2 ASA  - will hold off on steroids at this time as it can worsen SRC, however lower on differential for KAN at this time.  - repeat UA given initial UA with WBCs    #anemia  - iron and ferritin WNL    Discussed with attending Dr. Olivarez

## 2019-09-24 NOTE — PROGRESS NOTE ADULT - SUBJECTIVE AND OBJECTIVE BOX
CHIEF COMPLAINT:    Interval Events: Patient feeling well this morning. Decreased morning cough. No scan blood in sputum. Looking forward to interdisciplinary meeting and eventual discharge from inpatient setting. SCr continues to uptrend (2.26 from 2.06)    REVIEW OF SYSTEMS:  CONSTITUTIONAL: No weakness, fevers or chills  EYES/ENT: No visual changes;  No vertigo or throat pain   NECK: No pain or stiffness  RESPIRATORY: No cough, wheezing, hemoptysis; No shortness of breath  CARDIOVASCULAR: No chest pain or palpitations  GASTROINTESTINAL: No abdominal or epigastric pain. No nausea, vomiting, or hematemesis; No diarrhea or constipation. No melena or hematochezia.  GENITOURINARY: No dysuria, frequency or hematuria  NEUROLOGICAL: No numbness or weakness  SKIN: No itching, burning, rashes, or lesions   All other review of systems is negative unless indicated above.    OBJECTIVE:  ICU Vital Signs Last 24 Hrs  T(C): 36.9 (24 Sep 2019 07:00), Max: 37.2 (23 Sep 2019 22:10)  T(F): 98.4 (24 Sep 2019 07:00), Max: 98.9 (23 Sep 2019 22:10)  HR: 85 (24 Sep 2019 07:00) (81 - 89)  BP: 147/93 (24 Sep 2019 07:00) (130/82 - 147/93)  BP(mean): --  ABP: --  ABP(mean): --  RR: 18 (24 Sep 2019 07:00) (18 - 18)  SpO2: 96% (24 Sep 2019 07:00) (95% - 99%)        CAPILLARY BLOOD GLUCOSE          PHYSICAL EXAM:  General: WN/WD NAD  Neurology: A&Ox3, nonfocal, OHARA x 4  Eyes: PERRLA/ EOMI, Gross vision intact  ENT/Neck: Neck supple, trachea midline, No JVD, Gross hearing intact  Respiratory: CTA B/L, No wheezing, rales, rhonchi  CV: RRR, +S1/S2, -S3/S4, no murmurs, rubs or gallops  Abdominal: Soft, NT, ND +BS, No HSM  MSK: 5/5 strength UE/LE bilaterally  Extremities: No edema, 2+ peripheral pulses  Skin: No Rashes, Hematoma, Ecchymosis    HOSPITAL MEDICATIONS:  MEDICATIONS  (STANDING):  cholecalciferol 400 Unit(s) Oral daily  cyclopentolate 0.5% Solution 1 Drop(s) Right EYE three times a day  docusate sodium 100 milliGRAM(s) Oral three times a day  folic acid 1 milliGRAM(s) Oral daily  influenza   Vaccine 0.5 milliLiter(s) IntraMuscular once  labetalol 100 milliGRAM(s) Oral two times a day  pantoprazole    Tablet 40 milliGRAM(s) Oral at bedtime  prednisoLONE acetate 1% Suspension 1 Drop(s) Right EYE four times a day  senna 2 Tablet(s) Oral at bedtime    MEDICATIONS  (PRN):  acetaminophen   Tablet .. 650 milliGRAM(s) Oral every 6 hours PRN Moderate Pain (4 - 6)  aluminum hydroxide/magnesium hydroxide/simethicone Suspension 30 milliLiter(s) Oral every 6 hours PRN Dyspepsia  ondansetron    Tablet 4 milliGRAM(s) Oral every 6 hours PRN Nausea and/or Vomiting  oxyCODONE    IR 5 milliGRAM(s) Oral every 6 hours PRN Severe Pain (7 - 10)      LABS:                        9.6    8.11  )-----------( 309      ( 24 Sep 2019 06:30 )             30.9     Hgb Trend: 9.6<--, 9.5<--, 9.4<--, 9.8<--, 9.5<--  09-24    138  |  100  |  15  ----------------------------<  96  4.2   |  23  |  2.26<H>    Ca    9.6      24 Sep 2019 06:30  Phos  4.1     09-24  Mg     2.3     09-24    TPro  7.5  /  Alb  3.5  /  TBili  0.3  /  DBili  x   /  AST  40<H>  /  ALT  34<H>  /  AlkPhos  112  09-23    Creatinine Trend: 2.26<--, 2.06<--, 2.06<--, 2.02<--, 2.09<--, 1.91<--            MICROBIOLOGY:     Culture - Urine (collected 21 Sep 2019 18:25)  Source: URINE MIDSTREAM  Final Report (23 Sep 2019 09:04):    NO GROWTH AT 24 HOURS    Culture - Acid Fast - Sputum w/Smear (collected 21 Sep 2019 16:17)  Source: SPUTUM  Final Report (22 Sep 2019 14:32):    AFB SMEAR= NO ACID FAST BACILLI SEEN

## 2019-09-24 NOTE — PROGRESS NOTE ADULT - PROBLEM SELECTOR PLAN 2
- Cr on admission 1.8 vs. 1.11 on 9/12/19, downtrended and now increasing  - Concern for intra-renal, post-renal KAN, will r/o possible autoimmune disorder/vasculitis  - Patient recently using increased doses of ASA at home for HA  - Renal U/S non-remarkable. Bladder scan not indicative of urinary retention   - nephrology recommendations appreciated   - Will continue to monitor CMP   - Monitor Cr and UOP  - Avoid NSAIDs, ACEi/ARBs, contrast, nephrotoxic agents. Will continue to hold pt's home losartan.  - Renally dose meds - Cr on admission 1.8 vs. 1.11 on 9/12/19, downtrended and now increasing  - Concern for intra-renal, post-renal KAN, will r/o possible autoimmune disorder/vasculitis  - Patient recently using increased doses of ASA at home for HA  - Renal U/S non-remarkable. Bladder scan not indicative of urinary retention   - nephrology recommendations appreciated   - If Sr Creatinine is stable or decreases, can be discharged home, if it is elevated, will probably need renal biopsy  - Avoid NSAIDs, ACEi/ARBs, contrast, nephrotoxic agents. Will continue to hold pt's home losartan.  - Renally dose meds

## 2019-09-24 NOTE — PROGRESS NOTE ADULT - PROBLEM SELECTOR PLAN 6
- SBP in the 130s, acceptable  - Will hold pt's home losartan for now given KAN  - Will hold pt's home amlodipine for now given recent hemoptysis  - C/w labetalol 100 mg bid with hold parameters

## 2019-09-24 NOTE — PROGRESS NOTE ADULT - PROBLEM SELECTOR PLAN 1
Pt. with KAN in the setting of recent NSAID and ARB use. On review of previous labs on [facility], Scr was 1.1 on labs done during ER visit on 9/12/19. On current admission (9/17/19), Scr was elevated to 1.80, improved next day to 1.65, however increased to 2.09 on 9/20/19 and had remained stable for several days. Scr this AM slightly more elevated at 2.26. Pt. denies previous history of kidney disease, however admits to taking nearly 2 g of ASA (Excedrin) daily from 9/7/19 through 9/17/19. Additionally, pt. was taking on Losartan at home. Losartan held during current hospital stay. UA negative for protein or blood with normal spot urine TP/CR. Renal US on 9/20/19 without hydronephrosis. Serological testing showed negative ANCAs and HIV testing. C3 and C4 were not low. Pt. positive for JULI, borderline dsDNA, and scleroderma ab. Continue to monitor labs and UOP. If patient's kidney function worsens, will consider kidney biopsy. Avoid any potential nephrotoxins Pt. with KAN in the setting of recent NSAID and ARB use. On review of previous labs on [facility], Scr was 1.1 on labs done during ER visit on 9/12/19. On current admission (9/17/19), Scr was elevated to 1.80, improved next day to 1.65, however increased to 2.09 on 9/20/19 and had remained stable for several days. Scr increased further to 2.26 today. Pt. denies previous history of kidney disease, however admits to taking nearly 2 g of ASA (Excedrin) daily from 9/7/19 through 9/17/19. Additionally, pt. was taking on Losartan at home. Losartan held during current hospital stay. UA negative for protein or blood with normal spot urine TP/CR. Renal US on 9/20/19 without hydronephrosis. Serological testing showed negative ANCAs and HIV testing. C3 and C4 were not low. Pt. positive for JULI, borderline dsDNA, and scleroderma ab. Continue to monitor labs and UOP. If patient's kidney function worsens, will consider kidney biopsy. Avoid any potential nephrotoxins

## 2019-09-24 NOTE — PROGRESS NOTE ADULT - PROBLEM SELECTOR PLAN 4
- Pt with diagnosis of iritis and anterior uveitis by ophthalmology on 9/17   - uveitis improving as per ophtho, c/w cyclopentolate and prednisone drops  - Positive seromarkers for SLE and Sjogren's (positive JULI and weakly positive dsDNA and scleroderma antibodies), concern for mixed connective tissue disorder. C3/C4, c-ANCA, p-ANCA, RF negative so far   - Rheumatology following, appreciate recs   - Pain control with Tylenol PRN for moderate pain and Oxycodone IR 5 mg q6hrs PRN for severe pain

## 2019-09-24 NOTE — PROGRESS NOTE ADULT - ASSESSMENT
49F PMH HTN and migraines p/w anterior uveitis, KAN, and persistent hemoptysis in the context of excessive Excedrin use. Hemoptysis is currently resolving and creatinine is continuing to uptrend at 2.06-->2.26 (baseline 1.1).    Assessment:   - Etiology of her hemoptysis and KAN is likely autoimmune vs superimposed pneumonia vs nsaid overuse. At this point, testing has revealed HLA-B27+, intermediate dsDNA: 59, JULI: 1:640, and scleroderma ab 1.2. She has positive inflammatory markers, ESR: 98, CRP: 29.   - Scleroderma antibodies are lightly positive, but pt. denying CREST symptoms aside from GERD.  - Undifferentiated connective tissue disease is a possibility, as is sarcoid. However, patient is asymptomatic from pulmonary standpoint and hemoptysis has resolved with discontinuation of ASA and no treatment for rheumatologic etiologies have been pursued.   - GBM antibody negative.   - Behcet's is unlikely given no history of vaginal or oral ulcers  - Continue workup as per rheum and follow up results.      Recommendations for now  - Would not biopsy lymph nodes at this time as they are 1cm in size and we have a possible etiology in autoimmune conditions/PNA/drug induced  - Repeat CT scan in 3 months as an outpatient to reassess LN size and pulmonary architecture  - Continue Azithromycin and Ceftriaxone for 5 days total   - f/u renal re: biopsy (clinically indicated or not)        Jose David Hinojosa MD  PGY-5  Pulmonary and Critical Care Fellow  Pager: [phone]

## 2019-09-24 NOTE — PROGRESS NOTE ADULT - SUBJECTIVE AND OBJECTIVE BOX
Ashli Rodney, PGY 1  [facility]: [id number]  [facility]: [phone]    CC: Patient is a 49y old  Female who presents with a chief complaint of Hemoptysis (22 Sep 2019 08:41)      SUBJECTIVE / OVERNIGHT EVENTS:       MEDICATIONS  (STANDING):  cholecalciferol 400 Unit(s) Oral daily  cyclopentolate 0.5% Solution 1 Drop(s) Right EYE three times a day  docusate sodium 100 milliGRAM(s) Oral three times a day  folic acid 1 milliGRAM(s) Oral daily  influenza   Vaccine 0.5 milliLiter(s) IntraMuscular once  labetalol 100 milliGRAM(s) Oral two times a day  pantoprazole    Tablet 40 milliGRAM(s) Oral at bedtime  prednisoLONE acetate 1% Suspension 1 Drop(s) Right EYE four times a day  senna 2 Tablet(s) Oral at bedtime    MEDICATIONS  (PRN):  acetaminophen   Tablet .. 650 milliGRAM(s) Oral every 6 hours PRN Moderate Pain (4 - 6)  aluminum hydroxide/magnesium hydroxide/simethicone Suspension 30 milliLiter(s) Oral every 6 hours PRN Dyspepsia  ondansetron    Tablet 4 milliGRAM(s) Oral every 6 hours PRN Nausea and/or Vomiting  oxyCODONE    IR 5 milliGRAM(s) Oral every 6 hours PRN Severe Pain (7 - 10)      Vitals:  Vital Signs Last 24 Hrs  T(C): 37.2 (23 Sep 2019 22:10), Max: 37.2 (23 Sep 2019 22:10)  T(F): 98.9 (23 Sep 2019 22:10), Max: 98.9 (23 Sep 2019 22:10)  HR: 89 (23 Sep 2019 22:10) (81 - 89)  BP: 130/82 (23 Sep 2019 22:10) (130/82 - 136/87)  BP(mean): --  RR: 18 (23 Sep 2019 22:10) (18 - 18)  SpO2: 95% (23 Sep 2019 22:10) (95% - 99%)    PHYSICAL EXAM  GENERAL: well developed woman, sleeping in bed comfortably in NAD  NEURO: no focal neurologic deficits, AAOX3   HEAD:  Atraumatic, Normocephalic  EYES: wearing sunglasses, conjunctiva and sclera clear, EOMI, no pain on eye movement  CHEST/LUNG: Clear to auscultation bilaterally; No wheezes, rales or rhonchi  HEART: Regular rate and rhythm; No murmurs, rubs, or gallops  ABDOMEN: Soft, Nontender, Nondistended; Bowel sounds present, no masses.  EXTREMITIES:  warm and well perfused, no pedal edema  SKIN: Warm, dry, in tact, no rashes or lesions  PSYCH: affect appropriate    LABS:                                            9.5    8.85  )-----------( 320      ( 22 Sep 2019 07:00 )             30.1     09-    134<L>  |  98  |  12  ----------------------------<  124<H>  4.0   |  23  |  2.06<H>    Ca    8.9      23 Sep 2019 07:35    TPro  7.5  /  Alb  3.5  /  TBili  0.3  /  DBili  x   /  AST  40<H>  /  ALT  34<H>  /  AlkPhos  112  -    Scleroderma positive 1.2  Homogenous JULI  Anti-DS DNA elevated at 59        Urinalysis Basic - ( 20 Sep 2019 19:44 )    Color: LIGHT YELLOW / Appearance: CLEAR / S.012 / pH: 6.5  Gluc: NEGATIVE / Ketone: NEGATIVE  / Bili: NEGATIVE / Urobili: NORMAL   Blood: NEGATIVE / Protein: 20 / Nitrite: NEGATIVE   Leuk Esterase: TRACE / RBC: 3-5 / WBC 6-10   Sq Epi: OCC / Non Sq Epi: x / Bacteria: NEGATIVE      I&O's Summary Ashli Ramseyng, PGY 1  [facility]: [id number]  [facility]: [phone]    CC: Patient is a 49y old  Female who presents with a chief complaint of Hemoptysis (22 Sep 2019 08:41)      SUBJECTIVE / OVERNIGHT EVENTS:   No acute events overnight. She states that she has episodes of diaphoresis at night, which resolve on its own. Patient not complaining of anything else  this AM.    MEDICATIONS  (STANDING):  cholecalciferol 400 Unit(s) Oral daily  cyclopentolate 0.5% Solution 1 Drop(s) Right EYE three times a day  docusate sodium 100 milliGRAM(s) Oral three times a day  folic acid 1 milliGRAM(s) Oral daily  influenza   Vaccine 0.5 milliLiter(s) IntraMuscular once  labetalol 100 milliGRAM(s) Oral two times a day  pantoprazole    Tablet 40 milliGRAM(s) Oral at bedtime  prednisoLONE acetate 1% Suspension 1 Drop(s) Right EYE four times a day  senna 2 Tablet(s) Oral at bedtime    MEDICATIONS  (PRN):  acetaminophen   Tablet .. 650 milliGRAM(s) Oral every 6 hours PRN Moderate Pain (4 - 6)  aluminum hydroxide/magnesium hydroxide/simethicone Suspension 30 milliLiter(s) Oral every 6 hours PRN Dyspepsia  ondansetron    Tablet 4 milliGRAM(s) Oral every 6 hours PRN Nausea and/or Vomiting  oxyCODONE    IR 5 milliGRAM(s) Oral every 6 hours PRN Severe Pain (7 - 10)      Vitals:  Vital Signs Last 24 Hrs  T(C): 37.2 (23 Sep 2019 22:10), Max: 37.2 (23 Sep 2019 22:10)  T(F): 98.9 (23 Sep 2019 22:10), Max: 98.9 (23 Sep 2019 22:10)  HR: 89 (23 Sep 2019 22:10) (81 - 89)  BP: 130/82 (23 Sep 2019 22:10) (130/82 - 136/87)  BP(mean): --  RR: 18 (23 Sep 2019 22:10) (18 - 18)  SpO2: 95% (23 Sep 2019 22:10) (95% - 99%)    PHYSICAL EXAM  GENERAL: well developed woman, sleeping in bed comfortably in NAD  NEURO: no focal neurologic deficits, AAOX3   HEAD:  Atraumatic, Normocephalic  EYES: wearing sunglasses, conjunctiva and sclera clear, EOMI, no pain on eye movement  CHEST/LUNG: Clear to auscultation bilaterally; No wheezes, rales or rhonchi  HEART: Regular rate and rhythm; No murmurs, rubs, or gallops  ABDOMEN: Soft, Nontender, Nondistended; Bowel sounds present, no masses.  EXTREMITIES:  warm and well perfused, no pedal edema  SKIN: Warm, dry, in tact, no rashes or lesions  PSYCH: affect appropriate    LABS:                                                       9.6    8.11  )-----------( 309      ( 24 Sep 2019 06:30 )             30.9     09-    138  |  100  |  15  ----------------------------<  96  4.2   |  23  |  2.26<H>    Ca    9.6      24 Sep 2019 06:30  Phos  4.1       Mg     2.3         TPro  7.5  /  Alb  3.5  /  TBili  0.3  /  DBili  x   /  AST  40<H>  /  ALT  34<H>  /  AlkPhos  112  09-    Scleroderma positive 1.2  Homogenous JULI  Anti-DS DNA elevated at 59        Urinalysis Basic - ( 20 Sep 2019 19:44 )    Color: LIGHT YELLOW / Appearance: CLEAR / S.012 / pH: 6.5  Gluc: NEGATIVE / Ketone: NEGATIVE  / Bili: NEGATIVE / Urobili: NORMAL   Blood: NEGATIVE / Protein: 20 / Nitrite: NEGATIVE   Leuk Esterase: TRACE / RBC: 3-5 / WBC 6-10   Sq Epi: OCC / Non Sq Epi: x / Bacteria: NEGATIVE      I&O's Summary

## 2019-09-24 NOTE — PROGRESS NOTE ADULT - ASSESSMENT
50 yo  female, domiciled with family, employed by [employer], with history of HTN and migraine headaches (well controlled for past 2 years) presents with new onset of hemoptysis associated with N/V, fever and leukocytosis and recent diagnosis of iritis and anterior uveitis, concerning for pneumonia complicated by potential underlying autoimmune disorder and KAN s/p 5 days abx for step pneum CAP.

## 2019-09-24 NOTE — PROGRESS NOTE ADULT - SUBJECTIVE AND OBJECTIVE BOX
Eduardo Suárez MD PGY1   Pager: [phone] [place], [phone] [facility]  After 7: Night Float pager    Patient is a 49y old  Female who presents with a chief complaint of Hemoptysis (24 Sep 2019 07:10)      SUBJECTIVE / OVERNIGHT EVENTS: Overnight, no acute events. Patient seen and examined at bedside this AM.     MEDICATIONS  (STANDING):  cholecalciferol 400 Unit(s) Oral daily  cyclopentolate 0.5% Solution 1 Drop(s) Right EYE three times a day  docusate sodium 100 milliGRAM(s) Oral three times a day  folic acid 1 milliGRAM(s) Oral daily  influenza   Vaccine 0.5 milliLiter(s) IntraMuscular once  labetalol 100 milliGRAM(s) Oral two times a day  pantoprazole    Tablet 40 milliGRAM(s) Oral at bedtime  prednisoLONE acetate 1% Suspension 1 Drop(s) Right EYE four times a day  senna 2 Tablet(s) Oral at bedtime    MEDICATIONS  (PRN):  acetaminophen   Tablet .. 650 milliGRAM(s) Oral every 6 hours PRN Moderate Pain (4 - 6)  aluminum hydroxide/magnesium hydroxide/simethicone Suspension 30 milliLiter(s) Oral every 6 hours PRN Dyspepsia  ondansetron    Tablet 4 milliGRAM(s) Oral every 6 hours PRN Nausea and/or Vomiting  oxyCODONE    IR 5 milliGRAM(s) Oral every 6 hours PRN Severe Pain (7 - 10)      Vital Signs Last 24 Hrs  T(C): 36.9 (24 Sep 2019 07:00), Max: 37.2 (23 Sep 2019 22:10)  T(F): 98.4 (24 Sep 2019 07:00), Max: 98.9 (23 Sep 2019 22:10)  HR: 85 (24 Sep 2019 07:00) (81 - 89)  BP: 147/93 (24 Sep 2019 07:00) (130/82 - 147/93)  BP(mean): --  RR: 18 (24 Sep 2019 07:00) (18 - 18)  SpO2: 96% (24 Sep 2019 07:00) (95% - 99%)  CAPILLARY BLOOD GLUCOSE        I&O's Summary      PHYSICAL EXAM:  GENERAL: NAD, well-developed  EYES: EOMI, PERRLA, conjunctiva and sclera clear  NECK:  No JVD  CHEST/LUNG: CTABL ; No wheeze  HEART: RRR; No murmurs  ABDOMEN: Soft, Nontender, Nondistended; Bowel sounds present  : No Barrett  EXTREMITIES:  2+ Peripheral Pulses, No edema  PSYCH: AAOx3  NEUROLOGY: non-focal  SKIN: No rashes or lesions. No sacral ulcer    LABS:    Glomerular Basement Membrane Ab IgG: <1.0: INFCE Result Units: [id number]    HLA-B27 Result             Positive    IZZY Antibody Screening Test (09.21.19 @ 06:10)    IZZY - Smith Antibody: < 0.2: INFCE Result Units: [id number]    IZZY - RNP Antibody: 0.3: INFCE Result Units: [id number]                    9.6    8.11  )-----------( 309      ( 24 Sep 2019 06:30 )             30.9     09-24    138  |  100  |  15  ----------------------------<  96  4.2   |  23  |  2.26<H>    Ca    9.6      24 Sep 2019 06:30  Phos  4.1     09-24  Mg     2.3     09-24    TPro  7.5  /  Alb  3.5  /  TBili  0.3  /  DBili  x   /  AST  40<H>  /  ALT  34<H>  /  AlkPhos  112  09-23            Microbiology;        RADIOLOGY & ADDITIONAL TESTS:    Imaging Personally Reviewed:

## 2019-09-24 NOTE — PROGRESS NOTE ADULT - SUBJECTIVE AND OBJECTIVE BOX
[facility] DIVISION OF KIDNEY DISEASES AND HYPERTENSION -- FOLLOW UP NOTE  --------------------------------------------------------------------------------  HPI: 50 yo F with HTN and migraine headaches is admitted with hemoptysis and elevated Scr. Pt. is currently being worked up for infectious and rheumatologic causes for hemoptysis. Nephrology team is following for elevated Scr. Pt. denies history of kidney disease. Pt. states that she was taking Excedrin (containing 250 mg of ASA per pill) x2 pills every 6 hours for migraines for 10 days prior to hospitalization Pt. takes Losartan at home, but has lost refills and has not taken it for several days. No labs prior to September 2019 for review in [facility]. Pt. had visited the ER on 9/12/19 and was noted to have a normal Scr of 1.1. On current admission (9/17/19) pt. found to have an elevated Scr of 1.80, which improved to 1.65 on 9/18/19, before uptrending to 2.09 on 9/21/19. Scr had remained elevated but stable for past several days, however today increased to 2.24. UA negative for blood and protein. Renal US on 9/20/19 with no masses, calculi, or hydronephrosis. Serologic testing positive for JULI, borderline ds-DNA, and scleroderma ab.    Pt. seen and examined at bedside this AM. Pt. states that she feels well but is worried about returning home. Denies dysuria. Denies CP, SOB, N/V/F/C.    PAST HISTORY  --------------------------------------------------------------------------------  No significant changes to PMH, PSH, FHx, SHx, unless otherwise noted    ALLERGIES & MEDICATIONS  --------------------------------------------------------------------------------  Allergies    No Known Allergies    Intolerances      Standing Inpatient Medications  cholecalciferol 400 Unit(s) Oral daily  cyclopentolate 0.5% Solution 1 Drop(s) Right EYE three times a day  docusate sodium 100 milliGRAM(s) Oral three times a day  folic acid 1 milliGRAM(s) Oral daily  influenza   Vaccine 0.5 milliLiter(s) IntraMuscular once  labetalol 100 milliGRAM(s) Oral two times a day  pantoprazole    Tablet 40 milliGRAM(s) Oral at bedtime  prednisoLONE acetate 1% Suspension 1 Drop(s) Right EYE four times a day  senna 2 Tablet(s) Oral at bedtime    REVIEW OF SYSTEMS  --------------------------------------------------------------------------------  Gen: No lethargy  Respiratory: No dyspnea  CV: No chest pain  GI: No abdominal pain  MSK: No LE edema  Neuro: No dizziness  Heme: No bleeding    All other systems were reviewed and are negative, except as noted.    VITALS/PHYSICAL EXAM  --------------------------------------------------------------------------------  T(C): 36.9 (09-24-19 @ 07:00), Max: 37.2 (09-23-19 @ 22:10)  HR: 85 (09-24-19 @ 07:00) (81 - 89)  BP: 147/93 (09-24-19 @ 07:00) (130/82 - 147/93)  RR: 18 (09-24-19 @ 07:00) (18 - 18)  SpO2: 96% (09-24-19 @ 07:00) (95% - 99%)  Wt(kg): --    Physical Exam:  	Gen: NAD, well-appearing  	HEENT: Supple neck   	Pulm: CTA B/L  	CV: RRR, S1S2; no rub  	Abd: +BS, soft, nontender/nondistended  	: No suprapubic tenderness  	LE: No edema b/l  	Skin: Warm, without rashes  	Psych: Normal affect    LABS/STUDIES  --------------------------------------------------------------------------------              9.6    8.11  >-----------<  309      [09-24-19 @ 06:30]              30.9     138  |  100  |  15  ----------------------------<  96      [09-24-19 @ 06:30]  4.2   |  23  |  2.26        Ca     9.6     [09-24-19 @ 06:30]      Mg     2.3     [09-24-19 @ 06:30]      Phos  4.1     [09-24-19 @ 06:30]    Creatinine Trend:  SCr 2.26 [09-24 @ 06:30]  SCr 2.06 [09-23 @ 07:35]  SCr 2.06 [09-22 @ 07:00]  SCr 2.02 [09-21 @ 06:10]  SCr 2.09 [09-20 @ 07:30] [facility] DIVISION OF KIDNEY DISEASES AND HYPERTENSION -- FOLLOW UP NOTE  --------------------------------------------------------------------------------  HPI: 48 yo F with HTN and migraine headaches is admitted with hemoptysis and elevated Scr. Pt. is currently being worked up for infectious and rheumatologic causes for hemoptysis. Nephrology team is following for elevated Scr. Pt. denies history of kidney disease. Pt. states that she was taking Excedrin (containing 250 mg of ASA per pill) x2 pills every 6 hours for migraines for 10 days prior to hospitalization Pt. takes Losartan at home, but has lost refills and has not taken it for several days. No labs prior to September 2019 for review in [facility]/[place]. Pt. had visited the ER on 9/12/19 and was noted to have a normal Scr of 1.1. On current admission (9/17/19) pt. found to have an elevated Scr of 1.80, which improved to 1.65 on 9/18/19, before uptrending to 2.09 on 9/21/19. Scr had remained elevated but stable for past several days, however today increased to 2.24. UA negative for blood and protein. Renal US on 9/20/19 with no masses, calculi, or hydronephrosis. Serologic testing positive for JULI, borderline ds-DNA, and scleroderma ab.    Pt. seen and examined at bedside this AM. Pt. states that she feels well but is worried about returning home. Denies dysuria. Denies CP, SOB, N/V/F/C.    PAST HISTORY  --------------------------------------------------------------------------------  No significant changes to PMH, PSH, FHx, SHx, unless otherwise noted    ALLERGIES & MEDICATIONS  --------------------------------------------------------------------------------  Allergies    No Known Allergies    Intolerances    Standing Inpatient Medications  cholecalciferol 400 Unit(s) Oral daily  cyclopentolate 0.5% Solution 1 Drop(s) Right EYE three times a day  docusate sodium 100 milliGRAM(s) Oral three times a day  folic acid 1 milliGRAM(s) Oral daily  influenza   Vaccine 0.5 milliLiter(s) IntraMuscular once  labetalol 100 milliGRAM(s) Oral two times a day  pantoprazole    Tablet 40 milliGRAM(s) Oral at bedtime  prednisoLONE acetate 1% Suspension 1 Drop(s) Right EYE four times a day  senna 2 Tablet(s) Oral at bedtime    REVIEW OF SYSTEMS  --------------------------------------------------------------------------------  Gen: No lethargy  Respiratory: No dyspnea  CV: No chest pain  GI: No abdominal pain  MSK: No LE edema  Neuro: No dizziness  Heme: No bleeding    All other systems were reviewed and are negative, except as noted.    VITALS/PHYSICAL EXAM  --------------------------------------------------------------------------------  T(C): 36.9 (09-24-19 @ 07:00), Max: 37.2 (09-23-19 @ 22:10)  HR: 85 (09-24-19 @ 07:00) (81 - 89)  BP: 147/93 (09-24-19 @ 07:00) (130/82 - 147/93)  RR: 18 (09-24-19 @ 07:00) (18 - 18)  SpO2: 96% (09-24-19 @ 07:00) (95% - 99%)  Wt(kg): --    Physical Exam:  	Gen: NAD, well-appearing  	HEENT: Supple neck   	Pulm: CTA B/L  	CV: RRR, S1S2; no rub  	Abd: +BS, soft, nontender/nondistended  	: No suprapubic tenderness  	LE: No edema b/l  	Skin: Warm, without rashes  	Psych: Normal affect    LABS/STUDIES  --------------------------------------------------------------------------------              9.6    8.11  >-----------<  309      [09-24-19 @ 06:30]              30.9     138  |  100  |  15  ----------------------------<  96      [09-24-19 @ 06:30]  4.2   |  23  |  2.26        Ca     9.6     [09-24-19 @ 06:30]      Mg     2.3     [09-24-19 @ 06:30]      Phos  4.1     [09-24-19 @ 06:30]    Creatinine Trend:  SCr 2.26 [09-24 @ 06:30]  SCr 2.06 [09-23 @ 07:35]  SCr 2.06 [09-22 @ 07:00]  SCr 2.02 [09-21 @ 06:10]  SCr 2.09 [09-20 @ 07:30]

## 2019-09-24 NOTE — PROGRESS NOTE ADULT - ATTENDING COMMENTS
Patient seen and examined. Agree with above note by resident.    # Hemoptysis with right upper lobe ground glass opacities with fevers, uveitis and mediastinal hilar lymph nodes concerning for underlying autoimmune process. At this time GPA, sarcoidosis, scleroderma and lupus are in consideration. No unifying diagnosis apparent at this time. Patient with elevated JULI, DsDNA, scleroderma antibodies. Given negative ANCA, vasculitis process less likely but could be atypical presentation. Rheum input appreciated. Will need close follow up as outpt. Explained to patient that we might not be able to give her a definitive diagnosis prior to discharge.     #CAP - patient with upper lobe opacities and hemoptysis. Possibly infectious etiology. S/p 5 day course of ceftriaxone. Clinically improved however productive cough persists. No indication for bronch at this time per pulm. Leukocytosis resolved, patient is afebrile and clinically appears well. Explained to patient that repeat CT in 1-2 months is recommended to evaluate improvement.     #KAN - possibly underlying autoimmune process vs 2/2 nsaid use. Given renal function has stabilized, no indication for renal biopsy at this time. Case discussed with renal. Explained to patient that she will need close follow up with renal and in future if renal function worsens she may need renal biopsy. This workup can be performed as outpt. If renal function stable tomorrow, can dc home.     #Uveitis - optho following. C/w eye drops. outpt follow up.     Above discussed with patient at length at meeting today.

## 2019-09-24 NOTE — PROGRESS NOTE ADULT - ATTENDING COMMENTS
49F w/ HTN and migraines. Presents with hemoptysis found to have anterior uveitis and KAN. Of note, pt was taking excedrin ATC for several days prior to admission. Imaging shows axillary, medistinal and hilar LAD and RUL opacity, elevated ESR and CRP w/ serologies are positive for elevated JULI, +scleroderma ab, borderline elevated dsDNA. SHe has been ruled out for TB. Doing well overall.     - no unifying dx at this time to explain all these findings- ddx sarcoidosis, SLE, other undifferentiated CTD  - completed treatment for CAP with improvement in symptoms  - she is not hypoxemic, afebrile and well appearing  - would have repeat CT chest in 6-8 weeks to evaluate for improvement of current CT findings and have pulmonary follow-up as outpt with possible bronchoscopy at that time  - remainder of workup for KAN as per rheum/renal  - had meeting with pt today and explained the above and answered all the patient's questions to her satisfaction  - please call with any further questions

## 2019-09-24 NOTE — PROGRESS NOTE ADULT - PROBLEM SELECTOR PLAN 7
- Incidental left breast 2 x 1.1 cm nodule found on CT chest, r/o malignancy. Per pt, she had a biopsy 2 years ago, follows with outpatient obgyn    - Recommend outpatient follow up for mammography and ultrasound

## 2019-09-24 NOTE — PROGRESS NOTE ADULT - ATTENDING COMMENTS
48 yo with recent diagnosis of iritis/anterior uveitis, with FH of sarcoid in daughter, admitted for hemoptysis, CT scan showing ground glass opacities with hilar/mediastinal LAD, as well as KAN, with +JULI, +DSDNA, + scleroderma antibodies.   Hemoptysis - CT findings reviewed with chest radiology and are consistent with infection. Patient has improved on antibiotics.   KAN - with inactive urine and no proteinuria, likely AIN for excessive Excedrin use.   Uveitis - HLAB27+  In the right clinical setting, patient's positive serologies may suggest an underlying autoimmune overlaps, however autoimmune antibodies are non-specific and could be also found in the setting of infection.   It is unclear if there is a unifying diagnosis for all of her manifestations.    Plan:  -Pt can follow up outpatient with rheumatology to repeat rheumatology workup and reassess significance of labs.  -+ HLAB27 uveitis may warrant further workup of SI joint.  -Renal function needs to be closely monitored for improvement; explained to patient that there should be a low threshold for biopsy; to be determined by nephrology.

## 2019-09-24 NOTE — PROGRESS NOTE ADULT - PROBLEM SELECTOR PLAN 1
- Improved. Likely secondary to CAP pneumonia, s/p IV abx x 5 days  Sputum cx strep pnuemo  - AFB sputum smear/culture x3 negative, Quant gold negative  - HIV nonreactive  - CXR clear  - CT of chest w/o contrast significant for right upper lobe mixed solid and groundglass patchy groundglass opacity likely pneumonia. Recommended 1 to 3 month follow-up chest CT to ensure resolution and exclude primary lung neoplasm.  - High suspicion for autoimmune etiology given anterior uveitis  - Rheumatology labs have not resulted yet, pt ok to follow up outpatient with results

## 2019-09-24 NOTE — PROGRESS NOTE ADULT - PROBLEM SELECTOR PLAN 3
- Sputum culture, gram + cocci in pairs.   - CT of chest concerning for pneumonia RUL  - Pt received IV Ceftriaxone and IV Azithromycin for empiric treatment for CAP for 5 day course  - asymptomatic at this point

## 2019-09-25 VITALS
SYSTOLIC BLOOD PRESSURE: 126 MMHG | RESPIRATION RATE: 16 BRPM | HEART RATE: 84 BPM | DIASTOLIC BLOOD PRESSURE: 84 MMHG | OXYGEN SATURATION: 98 % | TEMPERATURE: 98 F

## 2019-09-25 LAB
ANION GAP SERPL CALC-SCNC: 17 MMO/L — HIGH (ref 7–14)
BUN SERPL-MCNC: 17 MG/DL — SIGNIFICANT CHANGE UP (ref 7–23)
CALCIUM SERPL-MCNC: 9.2 MG/DL — SIGNIFICANT CHANGE UP (ref 8.4–10.5)
CHLORIDE SERPL-SCNC: 95 MMOL/L — LOW (ref 98–107)
CO2 SERPL-SCNC: 20 MMOL/L — LOW (ref 22–31)
CREAT SERPL-MCNC: 2.2 MG/DL — HIGH (ref 0.5–1.3)
GLUCOSE SERPL-MCNC: 147 MG/DL — HIGH (ref 70–99)
HCT VFR BLD CALC: 28 % — LOW (ref 34.5–45)
HGB BLD-MCNC: 9.1 G/DL — LOW (ref 11.5–15.5)
MAGNESIUM SERPL-MCNC: 2.2 MG/DL — SIGNIFICANT CHANGE UP (ref 1.6–2.6)
MCHC RBC-ENTMCNC: 28.3 PG — SIGNIFICANT CHANGE UP (ref 27–34)
MCHC RBC-ENTMCNC: 32.5 % — SIGNIFICANT CHANGE UP (ref 32–36)
MCV RBC AUTO: 87.2 FL — SIGNIFICANT CHANGE UP (ref 80–100)
NRBC # FLD: 0.07 K/UL — SIGNIFICANT CHANGE UP (ref 0–0)
PHOSPHATE SERPL-MCNC: 3.8 MG/DL — SIGNIFICANT CHANGE UP (ref 2.5–4.5)
PLATELET # BLD AUTO: 277 K/UL — SIGNIFICANT CHANGE UP (ref 150–400)
PMV BLD: 10.3 FL — SIGNIFICANT CHANGE UP (ref 7–13)
POTASSIUM SERPL-MCNC: 4.3 MMOL/L — SIGNIFICANT CHANGE UP (ref 3.5–5.3)
POTASSIUM SERPL-SCNC: 4.3 MMOL/L — SIGNIFICANT CHANGE UP (ref 3.5–5.3)
RBC # BLD: 3.21 M/UL — LOW (ref 3.8–5.2)
RBC # FLD: 14.3 % — SIGNIFICANT CHANGE UP (ref 10.3–14.5)
SODIUM SERPL-SCNC: 132 MMOL/L — LOW (ref 135–145)
WBC # BLD: 9.16 K/UL — SIGNIFICANT CHANGE UP (ref 3.8–10.5)
WBC # FLD AUTO: 9.16 K/UL — SIGNIFICANT CHANGE UP (ref 3.8–10.5)

## 2019-09-25 PROCEDURE — 99232 SBSQ HOSP IP/OBS MODERATE 35: CPT | Mod: GC

## 2019-09-25 PROCEDURE — 99239 HOSP IP/OBS DSCHRG MGMT >30: CPT | Mod: GC

## 2019-09-25 PROCEDURE — 99231 SBSQ HOSP IP/OBS SF/LOW 25: CPT | Mod: GC

## 2019-09-25 RX ORDER — PANTOPRAZOLE SODIUM 20 MG/1
1 TABLET, DELAYED RELEASE ORAL
Qty: 30 | Refills: 0
Start: 2019-09-25 | End: 2019-10-24

## 2019-09-25 RX ORDER — PREDNISOLONE SODIUM PHOSPHATE 1 %
1 DROPS OPHTHALMIC (EYE)
Qty: 3 | Refills: 0
Start: 2019-09-25 | End: 2019-10-24

## 2019-09-25 RX ORDER — LOSARTAN POTASSIUM 100 MG/1
1 TABLET, FILM COATED ORAL
Qty: 0 | Refills: 0 | DISCHARGE

## 2019-09-25 RX ORDER — SODIUM CHLORIDE 0.65 %
1 AEROSOL, SPRAY (ML) NASAL
Qty: 1 | Refills: 0
Start: 2019-09-25 | End: 2019-10-24

## 2019-09-25 RX ORDER — CYCLOPENTOLATE HYDROCHLORIDE 10 MG/ML
1 SOLUTION/ DROPS OPHTHALMIC
Qty: 2 | Refills: 0
Start: 2019-09-25 | End: 2019-10-24

## 2019-09-25 RX ORDER — PREDNISOLONE SODIUM PHOSPHATE 1 %
1 DROPS OPHTHALMIC (EYE)
Qty: 0 | Refills: 0 | DISCHARGE

## 2019-09-25 RX ADMIN — Medication 1 DROP(S): at 05:16

## 2019-09-25 RX ADMIN — Medication 1 DROP(S): at 12:52

## 2019-09-25 RX ADMIN — Medication 1 SPRAY(S): at 05:15

## 2019-09-25 RX ADMIN — OXYCODONE HYDROCHLORIDE 5 MILLIGRAM(S): 5 TABLET ORAL at 00:20

## 2019-09-25 RX ADMIN — Medication 1 MILLIGRAM(S): at 12:51

## 2019-09-25 RX ADMIN — ONDANSETRON 4 MILLIGRAM(S): 8 TABLET, FILM COATED ORAL at 14:52

## 2019-09-25 RX ADMIN — Medication 650 MILLIGRAM(S): at 06:15

## 2019-09-25 RX ADMIN — Medication 650 MILLIGRAM(S): at 12:51

## 2019-09-25 RX ADMIN — CYCLOPENTOLATE HYDROCHLORIDE 1 DROP(S): 10 SOLUTION/ DROPS OPHTHALMIC at 05:15

## 2019-09-25 RX ADMIN — Medication 650 MILLIGRAM(S): at 05:16

## 2019-09-25 RX ADMIN — Medication 400 UNIT(S): at 12:52

## 2019-09-25 RX ADMIN — Medication 650 MILLIGRAM(S): at 13:20

## 2019-09-25 RX ADMIN — Medication 100 MILLIGRAM(S): at 05:15

## 2019-09-25 RX ADMIN — CYCLOPENTOLATE HYDROCHLORIDE 1 DROP(S): 10 SOLUTION/ DROPS OPHTHALMIC at 14:52

## 2019-09-25 NOTE — PROGRESS NOTE ADULT - REASON FOR ADMISSION
Hemoptysis

## 2019-09-25 NOTE — PROGRESS NOTE ADULT - SUBJECTIVE AND OBJECTIVE BOX
[facility] DIVISION OF KIDNEY DISEASES AND HYPERTENSION -- FOLLOW UP NOTE  --------------------------------------------------------------------------------  HPI: 50 yo F with HTN and migraine headaches is admitted with hemoptysis and elevated Scr. Pt. is currently being worked up for infectious and rheumatologic causes for hemoptysis. Nephrology team is following for elevated Scr. Pt. denies history of kidney disease. Pt. states that she was taking Excedrin (containing 250 mg of ASA per pill) x2 pills every 6 hours for migraines for 10 days prior to hospitalization Pt. takes Losartan at home, but has lost refills and has not taken it for several days. No labs prior to September 2019 for review in [facility]/[place]. Pt. had visited the ER on 9/12/19 and was noted to have a normal Scr of 1.1. On current admission (9/17/19) pt. found to have an elevated Scr of 1.80, which improved to 1.65 on 9/18/19, before uptrending to 2.09 on 9/21/19. Scr had remained elevated but stable for past several days, however increased to 2.24 yesterday. Scr stable today at 2.20. Initial UA negative for blood and protein. Renal US on 9/20/19 with no masses, calculi, or hydronephrosis. Serologic testing positive for JULI, borderline ds-DNA, and scleroderma ab.    Pt. seen and examined at bedside this AM. Yesterday, multi-disciplinary meeting was held to discuss patient's condition. Pt. now feels comfortable with outpatient follow up. Pt. denies dysuria, CP, SOB, N/V/F/C.    PAST HISTORY  --------------------------------------------------------------------------------  No significant changes to PMH, PSH, FHx, SHx, unless otherwise noted    ALLERGIES & MEDICATIONS  --------------------------------------------------------------------------------  Allergies    No Known Allergies    Intolerances    Standing Inpatient Medications  cholecalciferol 400 Unit(s) Oral daily  cyclopentolate 0.5% Solution 1 Drop(s) Right EYE three times a day  docusate sodium 100 milliGRAM(s) Oral three times a day  folic acid 1 milliGRAM(s) Oral daily  influenza   Vaccine 0.5 milliLiter(s) IntraMuscular once  labetalol 100 milliGRAM(s) Oral two times a day  pantoprazole    Tablet 40 milliGRAM(s) Oral at bedtime  prednisoLONE acetate 1% Suspension 1 Drop(s) Right EYE four times a day  senna 2 Tablet(s) Oral at bedtime    REVIEW OF SYSTEMS  --------------------------------------------------------------------------------  Gen: No lethargy  Respiratory: No dyspnea  CV: No chest pain  GI: No abdominal pain  MSK: No LE edema    All other systems were reviewed and are negative, except as noted.    VITALS/PHYSICAL EXAM  --------------------------------------------------------------------------------  T(C): 37.2 (09-25-19 @ 05:13), Max: 37.2 (09-24-19 @ 19:06)  HR: 89 (09-25-19 @ 05:13) (81 - 92)  BP: 140/85 (09-25-19 @ 05:13) (136/92 - 149/85)  RR: 16 (09-25-19 @ 05:13) (15 - 18)  SpO2: 98% (09-25-19 @ 05:13) (96% - 99%)  Wt(kg): --    09-24-19 @ 07:01  -  09-25-19 @ 07:00  --------------------------------------------------------  IN: 780 mL / OUT: 150 mL / NET: 630 mL    Physical Exam:  	Gen: NAD, well-appearing  	HEENT: Supple neck   	Pulm: CTA B/L  	CV: RRR, S1S2; no rub  	Abd: +BS, soft, nontender/nondistended  	: No suprapubic tenderness  	LE: No edema b/l  	Skin: Warm, without rashes  	Psych: Normal affect    LABS/STUDIES  --------------------------------------------------------------------------------              9.1    9.16  >-----------<  277      [09-25-19 @ 06:30]              28.0     132  |  95  |  17  ----------------------------<  147      [09-25-19 @ 06:30]  4.3   |  20  |  2.20        Ca     9.2     [09-25-19 @ 06:30]      Mg     2.2     [09-25-19 @ 06:30]      Phos  3.8     [09-25-19 @ 06:30]    Creatinine Trend:  SCr 2.20 [09-25 @ 06:30]  SCr 2.26 [09-24 @ 06:30]  SCr 2.06 [09-23 @ 07:35]  SCr 2.06 [09-22 @ 07:00]  SCr 2.02 [09-21 @ 06:10]    Urinalysis - [09-24-19 @ 18:15]      Color LIGHT YELLOW / Appearance CLEAR / SG 1.008 / pH 6.5      Gluc NEGATIVE / Ketone NEGATIVE  / Bili NEGATIVE / Urobili NORMAL       Blood NEGATIVE / Protein 10 / Leuk Est NEGATIVE / Nitrite NEGATIVE      RBC  / WBC  / Hyaline  / Gran  / Sq Epi  / Non Sq Epi  / Bacteria     anti-GBM <1.0      [09-19-19 @ 07:15]  Free Light Chains: kappa 5.79, lambda 3.90, ratio = 1.48      [09-18 @ 07:22]

## 2019-09-25 NOTE — PROGRESS NOTE ADULT - PROBLEM SELECTOR PLAN 1
- Improved. Likely secondary to CAP pneumonia, s/p IV abx x 5 days  Sputum cx strep pnuemo  - AFB sputum smear/culture x3 negative, Quant gold negative  - HIV nonreactive, CXR clear  - CT of chest w/o contrast significant for right upper lobe mixed solid and groundglass patchy groundglass opacity likely pneumonia. Recommended 1 to 3 month follow-up chest CT to ensure resolution and exclude primary lung neoplasm.  - High suspicion for autoimmune etiology given anterior uveitis  - some rheumatology labs have not resulted yet, pt ok to follow up outpatient with results

## 2019-09-25 NOTE — PROGRESS NOTE ADULT - SUBJECTIVE AND OBJECTIVE BOX
Ashli Stevens, PGY 1  J: [id number]  [facility]: [phone]    CC: Patient is a 49y old  Female who presents with a chief complaint of Hemoptysis (22 Sep 2019 08:41)      SUBJECTIVE / OVERNIGHT EVENTS:     MEDICATIONS  (STANDING):  cholecalciferol 400 Unit(s) Oral daily  cyclopentolate 0.5% Solution 1 Drop(s) Right EYE three times a day  docusate sodium 100 milliGRAM(s) Oral three times a day  folic acid 1 milliGRAM(s) Oral daily  influenza   Vaccine 0.5 milliLiter(s) IntraMuscular once  labetalol 100 milliGRAM(s) Oral two times a day  pantoprazole    Tablet 40 milliGRAM(s) Oral at bedtime  prednisoLONE acetate 1% Suspension 1 Drop(s) Right EYE four times a day  senna 2 Tablet(s) Oral at bedtime    MEDICATIONS  (PRN):  acetaminophen   Tablet .. 650 milliGRAM(s) Oral every 6 hours PRN Moderate Pain (4 - 6)  aluminum hydroxide/magnesium hydroxide/simethicone Suspension 30 milliLiter(s) Oral every 6 hours PRN Dyspepsia  ondansetron    Tablet 4 milliGRAM(s) Oral every 6 hours PRN Nausea and/or Vomiting  oxyCODONE    IR 5 milliGRAM(s) Oral every 6 hours PRN Severe Pain (7 - 10)      Vitals:  Vital Signs Last 24 Hrs  T(C): 37.2 (25 Sep 2019 05:13), Max: 37.2 (24 Sep 2019 19:06)  T(F): 98.9 (25 Sep 2019 05:13), Max: 98.9 (24 Sep 2019 19:06)  HR: 89 (25 Sep 2019 05:13) (81 - 92)  BP: 140/85 (25 Sep 2019 05:13) (136/92 - 149/85)  BP(mean): --  RR: 16 (25 Sep 2019 05:13) (15 - 18)  SpO2: 98% (25 Sep 2019 05:13) (96% - 99%)    PHYSICAL EXAM  GENERAL: well developed woman, sleeping in bed comfortably in NAD  NEURO: no focal neurologic deficits, AAOX3   HEAD:  Atraumatic, Normocephalic  EYES: wearing sunglasses, conjunctiva and sclera clear, EOMI, no pain on eye movement  CHEST/LUNG: Clear to auscultation bilaterally; No wheezes, rales or rhonchi  HEART: Regular rate and rhythm; No murmurs, rubs, or gallops  ABDOMEN: Soft, Nontender, Nondistended; Bowel sounds present, no masses.  EXTREMITIES:  warm and well perfused, no pedal edema  SKIN: Warm, dry, in tact, no rashes or lesions  PSYCH: affect appropriate    LABS:                                                       9.6    8.11  )-----------( 309      ( 24 Sep 2019 06:30 )             30.9     -    138  |  100  |  15  ----------------------------<  96  4.2   |  23  |  2.26<H>    Ca    9.6      24 Sep 2019 06:30  Phos  4.1       Mg     2.3         TPro  7.5  /  Alb  3.5  /  TBili  0.3  /  DBili  x   /  AST  40<H>  /  ALT  34<H>  /  AlkPhos  112      Scleroderma positive 1.2  Homogenous JULI  Anti-DS DNA elevated at 59        Urinalysis Basic - ( 20 Sep 2019 19:44 )    Color: LIGHT YELLOW / Appearance: CLEAR / S.012 / pH: 6.5  Gluc: NEGATIVE / Ketone: NEGATIVE  / Bili: NEGATIVE / Urobili: NORMAL   Blood: NEGATIVE / Protein: 20 / Nitrite: NEGATIVE   Leuk Esterase: TRACE / RBC: 3-5 / WBC 6-10   Sq Epi: OCC / Non Sq Epi: x / Bacteria: NEGATIVE      I&O's Summary Ashli Rodney, PGY 1  [facility]: [id number]  [facility]: [phone]    CC: Patient is a 49y old  Female who presents with a chief complaint of Hemoptysis (22 Sep 2019 08:41)      SUBJECTIVE / OVERNIGHT EVENTS:   No acute events overnight. The patient states that she has been doing well. No fevers, shortness of breath, chest pain, abdominal pain or urinary changes. Ready for safe discharge home.     MEDICATIONS  (STANDING):  cholecalciferol 400 Unit(s) Oral daily  cyclopentolate 0.5% Solution 1 Drop(s) Right EYE three times a day  docusate sodium 100 milliGRAM(s) Oral three times a day  folic acid 1 milliGRAM(s) Oral daily  influenza   Vaccine 0.5 milliLiter(s) IntraMuscular once  labetalol 100 milliGRAM(s) Oral two times a day  pantoprazole    Tablet 40 milliGRAM(s) Oral at bedtime  prednisoLONE acetate 1% Suspension 1 Drop(s) Right EYE four times a day  senna 2 Tablet(s) Oral at bedtime    MEDICATIONS  (PRN):  acetaminophen   Tablet .. 650 milliGRAM(s) Oral every 6 hours PRN Moderate Pain (4 - 6)  aluminum hydroxide/magnesium hydroxide/simethicone Suspension 30 milliLiter(s) Oral every 6 hours PRN Dyspepsia  ondansetron    Tablet 4 milliGRAM(s) Oral every 6 hours PRN Nausea and/or Vomiting  oxyCODONE    IR 5 milliGRAM(s) Oral every 6 hours PRN Severe Pain (7 - 10)      Vitals:  Vital Signs Last 24 Hrs  T(C): 37.2 (25 Sep 2019 05:13), Max: 37.2 (24 Sep 2019 19:06)  T(F): 98.9 (25 Sep 2019 05:13), Max: 98.9 (24 Sep 2019 19:06)  HR: 89 (25 Sep 2019 05:13) (81 - 92)  BP: 140/85 (25 Sep 2019 05:13) (136/92 - 149/85)  BP(mean): --  RR: 16 (25 Sep 2019 05:13) (15 - 18)  SpO2: 98% (25 Sep 2019 05:13) (96% - 99%)    PHYSICAL EXAM  GENERAL: well developed woman, sleeping in bed comfortably in NAD  NEURO: no focal neurologic deficits, AAOX3   HEAD:  Atraumatic, Normocephalic  EYES: wearing sunglasses, conjunctiva and sclera clear, EOMI, no pain on eye movement  CHEST/LUNG: Clear to auscultation bilaterally; No wheezes, rales or rhonchi  HEART: Regular rate and rhythm; No murmurs, rubs, or gallops  ABDOMEN: Soft, Nontender, Nondistended; Bowel sounds present, no masses.  EXTREMITIES:  warm and well perfused, no pedal edema  SKIN: Warm, dry, in tact, no rashes or lesions  PSYCH: affect appropriate    LABS:                                            9.1    9.16  )-----------( 277      ( 25 Sep 2019 06:30 )             28.0     09-25    132<L>  |  95<L>  |  17  ----------------------------<  147<H>  4.3   |  20<L>  |  2.20<H>    Ca    9.2      25 Sep 2019 06:30  Phos  3.8     -  Mg     2.2     -25        Scleroderma positive 1.2  Homogenous JULI  Anti-DS DNA elevated at 59        Urinalysis Basic - ( 20 Sep 2019 19:44 )    Color: LIGHT YELLOW / Appearance: CLEAR / S.012 / pH: 6.5  Gluc: NEGATIVE / Ketone: NEGATIVE  / Bili: NEGATIVE / Urobili: NORMAL   Blood: NEGATIVE / Protein: 20 / Nitrite: NEGATIVE   Leuk Esterase: TRACE / RBC: 3-5 / WBC 6-10   Sq Epi: OCC / Non Sq Epi: x / Bacteria: NEGATIVE      I&O's Summary

## 2019-09-25 NOTE — PROGRESS NOTE ADULT - PROVIDER SPECIALTY LIST ADULT
Internal Medicine
Nephrology
Ophthalmology
Pulmonology
Rheumatology
Nephrology
Pulmonology
Pulmonology
Nephrology
Internal Medicine
Internal Medicine

## 2019-09-25 NOTE — PROGRESS NOTE ADULT - ATTENDING COMMENTS
Patient seen and examined. Agree with above note by resident.    # Hemoptysis with right upper lobe ground glass opacities with fevers, uveitis and mediastinal hilar lymph nodes concerning for underlying autoimmune process. At this time GPA, sarcoidosis, scleroderma and lupus are in consideration. No unifying diagnosis apparent at this time. Patient with elevated JULI, DsDNA, scleroderma antibodies. Given negative ANCA, vasculitis process less likely but could be atypical presentation. Rheum input appreciated. Will need close follow up as outpt. Hemoptysis has now resolved.     #CAP - patient with upper lobe opacities and hemoptysis. Possibly infectious etiology. S/p 5 day course of ceftriaxone. Clinically improved. Explained to patient that repeat CT in 1-2 months is recommended to evaluate improvement.     #KAN - possibly underlying autoimmune process vs 2/2 nsaid use. Given renal function has stabilized, no indication for renal biopsy at this time. Case discussed with renal. Explained to patient that she will need close follow up with renal and in future if renal function worsens she may need renal biopsy. This workup can be performed as outpt.     #Uveitis - optho following. C/w eye drops. outpt follow up.     Above discussed with patient at length and she is agreeable with current plan. DC home today. Time spent 40 mins

## 2019-09-25 NOTE — PROGRESS NOTE ADULT - PROBLEM SELECTOR PLAN 3
- Sputum culture, gram + cocci in pairs  - CT of chest concerning for pneumonia RUL  - Pt received IV Ceftriaxone and IV Azithromycin for empiric treatment for CAP for 5 day course  - asymptomatic at this point

## 2019-09-25 NOTE — PROGRESS NOTE ADULT - ASSESSMENT
50 yo  female, domiciled with family, employed by [employer], with history of HTN and migraine headaches (well controlled for past 2 years) presents with new onset of hemoptysis associated with N/V, fever and leukocytosis and recent diagnosis of iritis and anterior uveitis, concerning for pneumonia complicated by potential underlying autoimmune disorder and KAN s/p 5 days abx for step pneum CAP. Currently course has been complicated by increasing creatinine, possibly secondary to autoimmune etiology vs. AIN from ASA usage vs. post renal KAN. If creatinine does not trend down, patient will most likely receive a renal biospy, if creatinine improves, she can follow outpatient. 50 yo  female, domiciled with family, employed by [employer], with history of HTN and migraine headaches (well controlled for past 2 years) presents with new onset of hemoptysis associated with N/V, fever and leukocytosis and recent diagnosis of iritis and anterior uveitis, concerning for pneumonia complicated by potential underlying autoimmune disorder and KAN s/p 5 days abx for step pneum CAP. Currently course has been complicated by increasing creatinine, possibly secondary to autoimmune etiology vs. AIN from ASA usage vs. post renal KAN. Creatinine down trending to 2.20. She can monitor kidney function outpatient with the rest of the rheumatology work up.

## 2019-09-25 NOTE — PROGRESS NOTE ADULT - PROBLEM SELECTOR PLAN 2
- creatinine now increasing since admission  - Concern for intra-renal, post-renal KAN, will r/o possible autoimmune disorder/vasculitis  - Patient recently using increased doses of ASA at home for HA  - nephrology recommendations appreciated   - If Sr Creatinine is stable or decreases, can be discharged home, if it is elevated, will probably need renal biopsy  - Avoid NSAIDs, ACEi/ARBs, contrast, nephrotoxic agents. Will continue to hold pt's home losartan, renally dose meds - creatinine now increasing since admission  - Concern for intra-renal, post-renal KAN, will r/o possible autoimmune disorder/vasculitis  - Patient recently using increased doses of ASA at home for HA  - nephrology recommendations appreciated   - Avoid NSAIDs, ACEi/ARBs, contrast, nephrotoxic agents. Will continue to hold pt's home losartan, renally dose meds

## 2019-09-25 NOTE — PROGRESS NOTE ADULT - PROBLEM SELECTOR PLAN 1
Pt. with KAN in the setting of recent NSAID and ARB use. On review of previous labs on [facility]/[place], Scr was 1.1 on labs done during ER visit on 9/12/19. On current admission (9/17/19), Scr was elevated to 1.80, improved next day to 1.65, however increased to 2.09 on 9/20/19 and had remained stable for several days. However, Scr increased to 2.26 yesterday, and is stable today at 2.20. Pt. denies previous history of kidney disease, however admits to taking nearly 2 g of ASA (Excedrin) daily from 9/7/19 through 9/17/19. Additionally, pt. was on Losartan at home. Losartan held during current hospital stay. Initial UA negative for protein or blood with normal spot urine TP/CR. Renal US on 9/20/19 without hydronephrosis. Serological testing showed negative ANCAs and HIV testing. C3 and C4 were not low. Pt. positive for JULI, borderline dsDNA, and scleroderma ab. Continue to monitor labs and UOP. Pt. to be followed closely as outpatient. Avoid any potential nephrotoxins Pt. with KAN in the setting of recent NSAID and ARB use. On review of previous labs on [facility]/[place], Scr was 1.1 on labs done during ER visit on 9/12/19. On current admission (9/17/19), Scr was elevated to 1.80, improved next day to 1.65, however increased to 2.09 on 9/20/19, Scr remained elevated/stable for several days, however increased to 2.26 yesterday. Scr elevated/stable at 2.20 today. Pt. denies previous history of kidney disease, however admits to taking nearly 2 g of ASA (Excedrin) daily from 9/7/19 through 9/17/19. Additionally, pt. was on Losartan at home. Losartan held during current hospital stay. Initial UA negative for protein or blood with normal spot urine TP/CR. Renal US on 9/20/19 without hydronephrosis. Serological testing showed negative ANCAs and HIV testing. C3 and C4 were not low. Pt. with positive JULI, borderline dsDNA, and scleroderma antibody and was seen by rheumatology team. Continue to monitor labs and UOP. Pt. to be discharged today, advised to follow-up in nephrology office in 2 weeks. Avoid any potential nephrotoxins

## 2019-09-25 NOTE — PROGRESS NOTE ADULT - PROBLEM SELECTOR PROBLEM 1
Hemoptysis
KAN (acute kidney injury)
Hemoptysis
KAN (acute kidney injury)
Hemoptysis
Hemoptysis

## 2019-09-27 PROBLEM — G43.909 MIGRAINE, UNSPECIFIED, NOT INTRACTABLE, WITHOUT STATUS MIGRAINOSUS: Chronic | Status: ACTIVE | Noted: 2019-09-18

## 2019-09-27 LAB — CCP AB SER-ACNC: <8 — SIGNIFICANT CHANGE UP

## 2019-09-30 ENCOUNTER — APPOINTMENT (OUTPATIENT)
Dept: OPHTHALMOLOGY | Facility: CLINIC | Age: 49
End: 2019-09-30
Payer: COMMERCIAL

## 2019-09-30 ENCOUNTER — NON-APPOINTMENT (OUTPATIENT)
Age: 49
End: 2019-09-30

## 2019-09-30 PROCEDURE — 92012 INTRM OPH EXAM EST PATIENT: CPT

## 2019-10-02 LAB — GAS PNL BLDMV: SIGNIFICANT CHANGE UP

## 2019-10-09 ENCOUNTER — NON-APPOINTMENT (OUTPATIENT)
Age: 49
End: 2019-10-09

## 2019-10-09 ENCOUNTER — APPOINTMENT (OUTPATIENT)
Dept: OPHTHALMOLOGY | Facility: CLINIC | Age: 49
End: 2019-10-09
Payer: COMMERCIAL

## 2019-10-09 PROCEDURE — 92012 INTRM OPH EXAM EST PATIENT: CPT

## 2019-10-15 ENCOUNTER — APPOINTMENT (OUTPATIENT)
Dept: RHEUMATOLOGY | Facility: CLINIC | Age: 49
End: 2019-10-15
Payer: COMMERCIAL

## 2019-10-15 ENCOUNTER — LABORATORY RESULT (OUTPATIENT)
Age: 49
End: 2019-10-15

## 2019-10-15 VITALS
BODY MASS INDEX: 26.19 KG/M2 | WEIGHT: 168.86 LBS | SYSTOLIC BLOOD PRESSURE: 139 MMHG | OXYGEN SATURATION: 98 % | HEART RATE: 100 BPM | TEMPERATURE: 98.6 F | DIASTOLIC BLOOD PRESSURE: 92 MMHG | HEIGHT: 67.13 IN

## 2019-10-15 DIAGNOSIS — E55.9 VITAMIN D DEFICIENCY, UNSPECIFIED: ICD-10-CM

## 2019-10-15 DIAGNOSIS — Z83.2 FAMILY HISTORY OF DISEASES OF THE BLOOD AND BLOOD-FORMING ORGANS AND CERTAIN DISORDERS INVOLVING THE IMMUNE MECHANISM: ICD-10-CM

## 2019-10-15 DIAGNOSIS — Z87.09 PERSONAL HISTORY OF OTHER DISEASES OF THE RESPIRATORY SYSTEM: ICD-10-CM

## 2019-10-15 DIAGNOSIS — H20.9 UNSPECIFIED IRIDOCYCLITIS: ICD-10-CM

## 2019-10-15 PROCEDURE — G0008: CPT

## 2019-10-15 PROCEDURE — 90682 RIV4 VACC RECOMBINANT DNA IM: CPT

## 2019-10-15 PROCEDURE — 99214 OFFICE O/P EST MOD 30 MIN: CPT | Mod: 25

## 2019-10-15 RX ORDER — PREDNISOLONE ACETATE 10 MG/ML
1 SUSPENSION/ DROPS OPHTHALMIC
Refills: 0 | Status: ACTIVE | COMMUNITY

## 2019-10-15 NOTE — DATA REVIEWED
[FreeTextEntry1] : Serology done at the hospital reviewed with patient \par \par +JULI, DsDNA 52, +Scl 70. Negative ANCA, RF, CCP, Sm, RNP\par Normal ACE, low Vitamin D\par +HLA B27 \par \par CT Chest\par Right upper lobe mixed solid and groundglass patchy \par groundglass opacity likely pneumonia. A 1 to 3 month follow-up chest CT \par is recommended to ensure resolution and exclude primary lung neoplasm.\par \par Mediastinal and hilar lymph nodes and 3 tiny left lung pulmonary nodules \par can be monitored on the follow-up CT.

## 2019-10-15 NOTE — REVIEW OF SYSTEMS
[Negative] : Psychiatric [Fever] : no fever [Chills] : no chills [Shortness Of Breath] : no shortness of breath [Cough] : no cough [FreeTextEntry6] : no further episodes of hemoptysis  [FreeTextEntry3] : She was seen by opthalmology, noted ongoing inflammation, prednisolone frequency was increased to 6 times/daily. she reports improvement of the discomfort.

## 2019-10-15 NOTE — PHYSICAL EXAM
[General Appearance - Alert] : alert [General Appearance - In No Acute Distress] : in no acute distress [Sclera] : the sclera and conjunctiva were normal [Auscultation Breath Sounds / Voice Sounds] : lungs were clear to auscultation bilaterally [Heart Sounds] : normal S1 and S2 [Hearing Threshold Finger Rub Not [place]] : hearing was normal [Edema] : there was no peripheral edema [Abdomen Soft] : soft [Abdomen Tenderness] : non-tender [Cervical Lymph Nodes Enlarged Posterior Bilaterally] : posterior cervical [Cervical Lymph Nodes Enlarged Anterior Bilaterally] : anterior cervical [Musculoskeletal - Swelling] : no joint swelling seen [Motor Tone] : muscle strength and tone were normal [] : no rash [No Focal Deficits] : no focal deficits [Oriented To Time, Place, And Person] : oriented to person, place, and time [FreeTextEntry1] : negative straight leg raise bilaterally, negative MAIRA

## 2019-10-15 NOTE — ASSESSMENT
[FreeTextEntry1] : 1-Episode of hemoptysis, CT chest with GGO and RUL solid opacities attributed to an infectious process\par QTB negative, AFBx3 negative\par ANCA negative \par \par -Will need repeat CT chest to assess resolution of the opacities if related to an infectious process as opposed to inflammatory. \par -Pulmonary follow up, referral given ( consideration for bronchoscopy/biopsy)\par \par 2- KAN, normal UA\par attributed to AIN as per renal, no renal biopsy pursued\par -Repeat BMP and UA today\par -Renal follow up scheduled (Dr Gibson)\par if persistent would discuss role for renal biopsy\par \par 3-One episode of uveitis\par Discussed with patient that certain systemic auto-immune diseases that could cause uveitis include AS, Behcet's, IBD, PsA, reactive arthritis, Sarcoidosis, Sjogren's, SLE, vasculitis...\par \par =+HLA B27, no personal or family history of IBD or psoriasis. no inflammatory back pain or stiffness, no hx of enthesitis\par -will obtain a baseline Xray of the L-S spine and SI joints \par -Sarcoidosis remains on the differential, will need a tissue biopsy for diagnosis (if persistent pulmonary opacities, a bronch with EB Bx might be useful)\par \par 4-+Scl 70, borderline elevated DsDNA\par patient has no features consistent with SLE or Scleroderma at this time\par will repeat labs and complete the serology. \par \par 5-Bone Health\par Low Vitamin Supplementation sent \par \par 6-HCM\par Flu vaccine given today\par Patient to follow up with PMD regarding Mammo/PAP (she had a longstanding hx of benign breast nodules)\par \par RTO after above\par \par Patient aware of my assessment and plan, all questions answered.\par \par \par \par

## 2019-10-15 NOTE — HISTORY OF PRESENT ILLNESS
[FreeTextEntry1] : 50 yo with recent diagnosis of bilateral anterior uveitis, recent hospitalization for hemoptysis, presenting for evaluation.\par \par -Hospitalization at [facility] September 2019\par admitted for hemoptysis, Chest CT scan showed ground glass opacities with hilar/mediastinal LAD. CT findings reviewed with chest radiology and are consistent with an infectious process, seen by pulmonary who agrees that it is consistent with pneumonia. A repeat CT chest in 1-3 months was recommended.\par She was noted to have KAN (Crea 1 increased to 2), normal UA. She was seen by nephrology who attributed it to AIN, no kidney biopsy was pursued.\par Serology was consistent with +JULI, DsDNA 52, +Scl 70. Negative ANCA, RF, CCP, Sm, RNP\par Normal ACE, low Vitamin D\par +HLA B27 \par \par -Family history: daughter with sarcoidosis\par No personal or family history of IBD or psoriasis  [de-identified] : Rheumatologic ROS:\par -no prior episodes of hemoptysis, no SOB or cough\par -no prior episodes of uveitis\par -no back pain or stiffness \par - No alopecia\par - No dry eyes or mouth\par - No oral ulcers\par - + reflux \par - No Raynaud's \par - No rashes or photosensitivity\par - No joint pain or swelling\par - No morning stiffness\par - One miscarriage, no pregnancy complications\par - No history of blood clots\par \par

## 2019-10-17 LAB
ALBUMIN MFR SERPL ELPH: 53.5 %
ALBUMIN SERPL ELPH-MCNC: 4.5 G/DL
ALBUMIN SERPL-MCNC: 4.2 G/DL
ALBUMIN/GLOB SERPL: 1.1 RATIO
ALBUPE: 24.7 %
ALP BLD-CCNC: 91 U/L
ALPHA1 GLOB MFR SERPL ELPH: 5.7 %
ALPHA1 GLOB SERPL ELPH-MCNC: 0.5 G/DL
ALPHA1UPE: 31.7 %
ALPHA2 GLOB MFR SERPL ELPH: 10.7 %
ALPHA2 GLOB SERPL ELPH-MCNC: 0.8 G/DL
ALPHA2UPE: 17.2 %
ALT SERPL-CCNC: 13 U/L
ANA PAT FLD IF-IMP: ABNORMAL
ANA SER IF-ACNC: ABNORMAL
ANION GAP SERPL CALC-SCNC: 12 MMOL/L
APPEARANCE: ABNORMAL
AST SERPL-CCNC: 17 U/L
B-GLOBULIN MFR SERPL ELPH: 12 %
B-GLOBULIN SERPL ELPH-MCNC: 0.9 G/DL
B2 GLYCOPROT1 AB SER QL: NEGATIVE
BACTERIA: NEGATIVE
BASOPHILS # BLD AUTO: 0.07 K/UL
BASOPHILS NFR BLD AUTO: 1.1 %
BETAUPE: 14.5 %
BILIRUB SERPL-MCNC: 0.4 MG/DL
BILIRUBIN URINE: NEGATIVE
BLOOD URINE: NORMAL
BUN SERPL-MCNC: 17 MG/DL
C3 SERPL-MCNC: 140 MG/DL
C4 SERPL-MCNC: 30 MG/DL
CALCIUM SERPL-MCNC: 9.6 MG/DL
CARDIOLIPIN AB SER IA-ACNC: NEGATIVE
CCP AB SER IA-ACNC: <8 UNITS
CENTROMERE IGG SER-ACNC: <0.2 CD:130001892
CHLORIDE SERPL-SCNC: 104 MMOL/L
CK SERPL-CCNC: 64 U/L
CO2 SERPL-SCNC: 23 MMOL/L
COLOR: NORMAL
CONFIRM: 29.8 SEC
CREAT 24H UR-MCNC: NORMAL G/24 H
CREAT SERPL-MCNC: 1.45 MG/DL
CREAT SPEC-SCNC: 183 MG/DL
CREAT/PROT UR: 0.2 RATIO
CREATININE UR (MAYO): 186 MG/DL
DRVVT IMM 1:2 NP PPP: ABNORMAL
DRVVT SCREEN TO CONFIRM RATIO: 1.38 RATIO
DSDNA AB SER-ACNC: 55 IU/ML
ENA RNP AB SER IA-ACNC: 0.3 AL
ENA SCL70 IGG SER IA-ACNC: 1.2 AL
ENA SM AB SER IA-ACNC: <0.2 AL
ENA SS-A AB SER IA-ACNC: <0.2 AL
ENA SS-B AB SER IA-ACNC: <0.2 AL
EOSINOPHIL # BLD AUTO: 0.27 K/UL
EOSINOPHIL NFR BLD AUTO: 4.2 %
GAMMA GLOB FLD ELPH-MCNC: 1.4 G/DL
GAMMA GLOB MFR SERPL ELPH: 18.1 %
GAMMAUPE: 11.9 %
GLUCOSE QUALITATIVE U: NEGATIVE
GLUCOSE SERPL-MCNC: 110 MG/DL
HCT VFR BLD CALC: 29.7 %
HGB BLD-MCNC: 10.2 G/DL
HYALINE CASTS: 4 /LPF
IGA 24H UR QL IFE: NORMAL
IGA 24H UR QL IFE: NORMAL
IMM GRANULOCYTES NFR BLD AUTO: 0.5 %
INTERPRETATION SERPL IEP-IMP: NORMAL
KAPPA LC 24H UR QL: NORMAL
KETONES URINE: NEGATIVE
LEUKOCYTE ESTERASE URINE: NEGATIVE
LYMPHOCYTES # BLD AUTO: 1.57 K/UL
LYMPHOCYTES NFR BLD AUTO: 24.2 %
M PROTEIN SPEC IFE-MCNC: NORMAL
MAN DIFF?: NORMAL
MCHC RBC-ENTMCNC: 31.6 PG
MCHC RBC-ENTMCNC: 34.3 GM/DL
MCV RBC AUTO: 92 FL
MICROSCOPIC-UA: NORMAL
MONOCYTES # BLD AUTO: 0.61 K/UL
MONOCYTES NFR BLD AUTO: 9.4 %
MPO AB + PR3 PNL SER: NORMAL
NEUTROPHILS # BLD AUTO: 3.95 K/UL
NEUTROPHILS NFR BLD AUTO: 60.6 %
NITRITE URINE: NEGATIVE
PH URINE: 5.5
PLATELET # BLD AUTO: 157 K/UL
POTASSIUM SERPL-SCNC: 4.2 MMOL/L
PROT PATTERN 24H UR ELPH-IMP: NORMAL
PROT SERPL-MCNC: 7.9 G/DL
PROT UR-MCNC: 27 MG/DL
PROT UR-MCNC: 27 MG/DL
PROT UR-MCNC: 30 MG/DL
PROTEIN URINE: NORMAL
RBC # BLD: 3.23 M/UL
RBC # FLD: 16.2 %
RED BLOOD CELLS URINE: 3 /HPF
RF+CCP IGG SER-IMP: NEGATIVE
RHEUMATOID FACT SER QL: <10 IU/ML
SCREEN DRVVT: 49.4 SEC
SILICA CLOTTING TIME INTERPRETATION: ABNORMAL
SILICA CLOTTING TIME S/C: 1.5 RATIO
SODIUM SERPL-SCNC: 139 MMOL/L
SPECIFIC GRAVITY URINE: 1.02
SPECIMEN VOL 24H UR: NORMAL ML
SQUAMOUS EPITHELIAL CELLS: 2 /HPF
UROBILINOGEN URINE: NORMAL
WBC # FLD AUTO: 6.5 K/UL
WHITE BLOOD CELLS URINE: 5 /HPF

## 2019-10-18 LAB — ACE BLD-CCNC: 47 U/L

## 2019-10-23 LAB
MISCELLANEOUS - CHEM: SIGNIFICANT CHANGE UP
MISCELLANEOUS - CHEM: SIGNIFICANT CHANGE UP

## 2019-10-24 ENCOUNTER — RESULT REVIEW (OUTPATIENT)
Age: 49
End: 2019-10-24

## 2019-10-24 LAB — RNA POLYMERASE III IGG: 13.1 U

## 2019-10-29 ENCOUNTER — APPOINTMENT (OUTPATIENT)
Dept: OPHTHALMOLOGY | Facility: CLINIC | Age: 49
End: 2019-10-29

## 2019-10-30 ENCOUNTER — APPOINTMENT (OUTPATIENT)
Dept: OPHTHALMOLOGY | Facility: CLINIC | Age: 49
End: 2019-10-30

## 2019-10-30 ENCOUNTER — EMERGENCY (EMERGENCY)
Facility: HOSPITAL | Age: 49
LOS: 1 days | Discharge: HOME | End: 2019-10-30
Attending: EMERGENCY MEDICINE | Admitting: EMERGENCY MEDICINE
Payer: COMMERCIAL

## 2019-10-30 VITALS
SYSTOLIC BLOOD PRESSURE: 151 MMHG | TEMPERATURE: 98 F | RESPIRATION RATE: 18 BRPM | OXYGEN SATURATION: 100 % | DIASTOLIC BLOOD PRESSURE: 98 MMHG | HEART RATE: 94 BPM

## 2019-10-30 DIAGNOSIS — O34.21 MATERNAL CARE FOR SCAR FROM PREVIOUS CESAREAN DELIVERY: Chronic | ICD-10-CM

## 2019-10-30 DIAGNOSIS — Z98.890 OTHER SPECIFIED POSTPROCEDURAL STATES: Chronic | ICD-10-CM

## 2019-10-30 LAB
ACID FAST STN SPT: SIGNIFICANT CHANGE UP
ALBUMIN SERPL ELPH-MCNC: 4.3 G/DL — SIGNIFICANT CHANGE UP (ref 3.3–5)
ALP SERPL-CCNC: 81 U/L — SIGNIFICANT CHANGE UP (ref 40–120)
ALT FLD-CCNC: 14 U/L — SIGNIFICANT CHANGE UP (ref 4–33)
ANION GAP SERPL CALC-SCNC: 12 MMO/L — SIGNIFICANT CHANGE UP (ref 7–14)
AST SERPL-CCNC: 21 U/L — SIGNIFICANT CHANGE UP (ref 4–32)
BASOPHILS # BLD AUTO: 0.08 K/UL — SIGNIFICANT CHANGE UP (ref 0–0.2)
BASOPHILS NFR BLD AUTO: 1.3 % — SIGNIFICANT CHANGE UP (ref 0–2)
BILIRUB SERPL-MCNC: 0.4 MG/DL — SIGNIFICANT CHANGE UP (ref 0.2–1.2)
BUN SERPL-MCNC: 12 MG/DL — SIGNIFICANT CHANGE UP (ref 7–23)
CALCIUM SERPL-MCNC: 9.4 MG/DL — SIGNIFICANT CHANGE UP (ref 8.4–10.5)
CHLORIDE SERPL-SCNC: 107 MMOL/L — SIGNIFICANT CHANGE UP (ref 98–107)
CO2 SERPL-SCNC: 22 MMOL/L — SIGNIFICANT CHANGE UP (ref 22–31)
CREAT SERPL-MCNC: 1.29 MG/DL — SIGNIFICANT CHANGE UP (ref 0.5–1.3)
EOSINOPHIL # BLD AUTO: 0.11 K/UL — SIGNIFICANT CHANGE UP (ref 0–0.5)
EOSINOPHIL NFR BLD AUTO: 1.8 % — SIGNIFICANT CHANGE UP (ref 0–6)
GLUCOSE SERPL-MCNC: 86 MG/DL — SIGNIFICANT CHANGE UP (ref 70–99)
HCT VFR BLD CALC: 29.7 % — LOW (ref 34.5–45)
HGB BLD-MCNC: 9.8 G/DL — LOW (ref 11.5–15.5)
IMM GRANULOCYTES NFR BLD AUTO: 0.3 % — SIGNIFICANT CHANGE UP (ref 0–1.5)
LYMPHOCYTES # BLD AUTO: 1.91 K/UL — SIGNIFICANT CHANGE UP (ref 1–3.3)
LYMPHOCYTES # BLD AUTO: 31.4 % — SIGNIFICANT CHANGE UP (ref 13–44)
MCHC RBC-ENTMCNC: 29 PG — SIGNIFICANT CHANGE UP (ref 27–34)
MCHC RBC-ENTMCNC: 33 % — SIGNIFICANT CHANGE UP (ref 32–36)
MCV RBC AUTO: 87.9 FL — SIGNIFICANT CHANGE UP (ref 80–100)
MONOCYTES # BLD AUTO: 0.8 K/UL — SIGNIFICANT CHANGE UP (ref 0–0.9)
MONOCYTES NFR BLD AUTO: 13.2 % — SIGNIFICANT CHANGE UP (ref 2–14)
NEUTROPHILS # BLD AUTO: 3.16 K/UL — SIGNIFICANT CHANGE UP (ref 1.8–7.4)
NEUTROPHILS NFR BLD AUTO: 52 % — SIGNIFICANT CHANGE UP (ref 43–77)
NRBC # FLD: 0 K/UL — SIGNIFICANT CHANGE UP (ref 0–0)
PLATELET # BLD AUTO: 189 K/UL — SIGNIFICANT CHANGE UP (ref 150–400)
PMV BLD: 10.8 FL — SIGNIFICANT CHANGE UP (ref 7–13)
POTASSIUM SERPL-MCNC: 3.5 MMOL/L — SIGNIFICANT CHANGE UP (ref 3.5–5.3)
POTASSIUM SERPL-SCNC: 3.5 MMOL/L — SIGNIFICANT CHANGE UP (ref 3.5–5.3)
PROT SERPL-MCNC: 7.8 G/DL — SIGNIFICANT CHANGE UP (ref 6–8.3)
RBC # BLD: 3.38 M/UL — LOW (ref 3.8–5.2)
RBC # FLD: 16 % — HIGH (ref 10.3–14.5)
SODIUM SERPL-SCNC: 141 MMOL/L — SIGNIFICANT CHANGE UP (ref 135–145)
WBC # BLD: 6.08 K/UL — SIGNIFICANT CHANGE UP (ref 3.8–10.5)
WBC # FLD AUTO: 6.08 K/UL — SIGNIFICANT CHANGE UP (ref 3.8–10.5)

## 2019-10-30 PROCEDURE — 99220: CPT

## 2019-10-30 PROCEDURE — 70450 CT HEAD/BRAIN W/O DYE: CPT | Mod: 26

## 2019-10-30 RX ORDER — ACETAMINOPHEN 500 MG
975 TABLET ORAL ONCE
Refills: 0 | Status: DISCONTINUED | OUTPATIENT
Start: 2019-10-30 | End: 2019-10-30

## 2019-10-30 RX ORDER — AMLODIPINE BESYLATE 2.5 MG/1
10 TABLET ORAL DAILY
Refills: 0 | Status: DISCONTINUED | OUTPATIENT
Start: 2019-10-30 | End: 2019-11-04

## 2019-10-30 RX ORDER — MECLIZINE HCL 12.5 MG
25 TABLET ORAL ONCE
Refills: 0 | Status: COMPLETED | OUTPATIENT
Start: 2019-10-30 | End: 2019-10-30

## 2019-10-30 RX ORDER — SODIUM CHLORIDE 9 MG/ML
1000 INJECTION INTRAMUSCULAR; INTRAVENOUS; SUBCUTANEOUS ONCE
Refills: 0 | Status: COMPLETED | OUTPATIENT
Start: 2019-10-30 | End: 2019-10-30

## 2019-10-30 RX ORDER — PREDNISOLONE SODIUM PHOSPHATE 1 %
1 DROPS OPHTHALMIC (EYE) EVERY 4 HOURS
Refills: 0 | Status: DISCONTINUED | OUTPATIENT
Start: 2019-10-30 | End: 2019-11-04

## 2019-10-30 RX ORDER — MECLIZINE HCL 12.5 MG
25 TABLET ORAL EVERY 8 HOURS
Refills: 0 | Status: DISCONTINUED | OUTPATIENT
Start: 2019-10-30 | End: 2019-11-04

## 2019-10-30 RX ORDER — LABETALOL HCL 100 MG
100 TABLET ORAL
Refills: 0 | Status: DISCONTINUED | OUTPATIENT
Start: 2019-10-30 | End: 2019-11-04

## 2019-10-30 RX ORDER — SODIUM CHLORIDE 9 MG/ML
1000 INJECTION INTRAMUSCULAR; INTRAVENOUS; SUBCUTANEOUS
Refills: 0 | Status: DISCONTINUED | OUTPATIENT
Start: 2019-10-30 | End: 2019-11-04

## 2019-10-30 RX ADMIN — Medication 25 MILLIGRAM(S): at 18:01

## 2019-10-30 RX ADMIN — SODIUM CHLORIDE 1000 MILLILITER(S): 9 INJECTION INTRAMUSCULAR; INTRAVENOUS; SUBCUTANEOUS at 19:02

## 2019-10-30 RX ADMIN — SODIUM CHLORIDE 1000 MILLILITER(S): 9 INJECTION INTRAMUSCULAR; INTRAVENOUS; SUBCUTANEOUS at 18:01

## 2019-10-30 NOTE — CONSULT NOTE ADULT - ASSESSMENT
50 y/o RH AA female with past medical history of ?MCTD (diagnosed in 9/2019), HTN, Migraines presents to the ED complaining of unsteadiness. Patient reports that about 10 days ago, she woke up out of bed to use the bathroom during the night at which point she felt slightly off balance while walking described as if she "was swaying" which lasted a few seconds and she returned to bed and went to sleep. Patient reports that since that day she has noticed a progressively worsening feeling of unsteadiness present only when ambulating described as "walking as if she's drunk." Patient reports she came to the ED on 10/30 because she felt as if she needed to hold on to the wall for support when ambulating. Of note, patient also endorses a sensation of room spinning dizziness present when turning her head from one direction to the other while laying in bed which lasts a few seconds and resolves on its own. Patient reported the unsteadiness when ambulating is worse in the morning when first getting out of bed and improves slightly if she "looks at the floor" when walking. Denies any accompanying headache, double vision, blurry vision, facial numbness, changes in taste, unilateral weakness, numbness/tingling, problems with speech.   Of note, patient recently hospitalized for hemoptysis in 9/2019 and found to have ground glass opacities on CT Chest as well as lymphadenopathy and a breast nodule. Patient also found to have +JULI, +dsDNA, +Scleroderma antibodies consistent with possible MCTD. During this hospital course, patient reports severe R mastoid pain w/ associated headache for which she was taking Excedrin. Patient also has a daughter with history of Sarcoidosis. Also of note, patient denies any URI after discharge but did receive her flu shot on 10/16. At the time of my interview, patient endorses no feeling of dizziness and reports she is improved with Meclizine and able to ambulate.   Neurologic exam demonstrates +L beating nystagmus on L gaze and +Rightward lean during seated examination (w/ eyes closed).     Impression: At this time, the exact etiology of patient's symptoms is unclear. She has improved with meclizine and is ambulating without difficulty. Potentially, progressive gait ataxia a/w intermittent vertiginous symptoms can possibly 2/2 vestibular neuronitis in the setting of ?R mastoiditis or previous URI; acute ischemic event is less likely; and sensory ataxia is also less likely. A consideration is initial stages of PCD though this too is less likely given significant symptomatic improvement in symptoms with a vestibular suppressant however, would still recommend evaluation of breast nodule as adenocarcinoma of the breast has been linked to PCD.     Recommendations:  [] Follow-up CT Head non-contrast  [] Meclizine 25mg PO Q8H PRN  [] If CT head non-contrast w/o abnormality, patient can follow-up with outpatient Neurology Clinic  Address: [address] [place] [address].  Phone: [phone]

## 2019-10-30 NOTE — ED PROVIDER NOTE - OBJECTIVE STATEMENT
50yo female HTN, undetermined rheumatological disease (MCTD vs. sarcoidosis) recently hospitalized for bacterial pneumonia, discharged 1 month ago p/w 2 weeks of worsening ataxia. She feels dizzy when moving her head, standing up abruptly. It is most noticeable when walking. She saw her rheumatologist who referred her to neurology but has not follow up with them yet. She denies AC use, falls, head trauma, h/o syphilis, numbness, weakness, new medications or change in medications. She is currently being treated for uveitis with prednisolone drops. She has good PO intake. She currently does not feel dizzy lying in bed.

## 2019-10-30 NOTE — ED PROVIDER NOTE - CLINICAL SUMMARY MEDICAL DECISION MAKING FREE TEXT BOX
48yo female HTN undetermined rheum disorder (MCTD vs sarcoid) p/w worsening ataxia. Does not seem like vertigo per exam and would expect more cerebellar signs with mass or cerebellitis. No new medications. Could be possible electrolyte imbalance. Will give IV fluids, draw labs and head CT and give meclizine and reassess gait. May need neuro consult and/or CDU for MRI given patient's complicated rheum history.

## 2019-10-30 NOTE — ED PROVIDER NOTE - NS ED ROS FT
ROS:  GENERAL: No fever, no chills  EYES: no change in vision  HEENT: no trouble swallowing, no trouble speaking  CARDIAC: no chest pain  PULMONARY: no cough, no shortness of breath  GI: no abdominal pain, no nausea, no vomiting, no diarrhea, no constipation  : No dysuria, no frequency, no change in appearance, or odor of urine  SKIN: no rashes  NEURO: ataxia, no headache, no weakness  MSK: No joint pain

## 2019-10-30 NOTE — ED ADULT NURSE NOTE - CHIEF COMPLAINT QUOTE
pt was D/C from [facility] 2 wks ago, PNA coming with increase dizziness since d/c denies cp/SOB/no weakness

## 2019-10-30 NOTE — ED PROVIDER NOTE - PROGRESS NOTE DETAILS
Nayeli PGY-1: patient's ataxia improved significantly after meclizine and fluids. Lab work c/w previous labs, awaiting CT head. Nayeli PGY-1: neurology consulted for ataxia, will see patient. Eddie: pt signed out to me by Dr Tirado pending CTH and neuro eval. CTH unremarkable. pending CDU evaluation for possible MRI.

## 2019-10-30 NOTE — CONSULT NOTE ADULT - ATTENDING COMMENTS
Pt with dizziness for 2 weeks and unsteadiness with ambulation. Pt felt better after getting meclizine, feels dizziness coming back somewhat as medication wears off. Was able to ambulate without difficulty this morning.   neurological exam unremarkable, able to ambulate well.     Likely vestibular neuritis. Pt can be discharged. Meclizine PRN.

## 2019-10-30 NOTE — ED ADULT NURSE NOTE - OBJECTIVE STATEMENT
48yo female HTN, states she was tested for Lupus, recently hospitalized for bacterial pneumonia, discharged 1 month ago p/w 2 weeks of worsening ataxia. She feels dizzy when moving her head, standing up abruptly. It is most noticeable when walking.   PIV placed medicated as ordered plan of care discussed with pt

## 2019-10-30 NOTE — CONSULT NOTE ADULT - SUBJECTIVE AND OBJECTIVE BOX
HPI: 48 y/o RH AA female with past medical history of ?MCTD (diagnosed in 2019), HTN, Migraines presents to the ED complaining of unsteadiness. Patient reports that about 10 days ago, she woke up out of bed to use the bathroom during the night at which point she felt slightly off balance while walking described as if she "was swaying" which lasted a few seconds and she returned to bed and went to sleep. Patient reports that since that day she has noticed a progressively worsening feeling of unsteadiness present only when ambulating described as "walking as if she's drunk." Patient reports she came to the ED on 10/30 because she felt as if she needed to hold on to the wall for support when ambulating. Of note, patient also endorses a sensation of room spinning dizziness present when turning her head from one direction to the other while laying in bed which lasts a few seconds and resolves on its own. Patient reported the unsteadiness when ambulating is worse in the morning when first getting out of bed and improves slightly if she "looks at the floor" when walking. Denies any accompanying headache, double vision, blurry vision, facial numbness, changes in taste, unilateral weakness, numbness/tingling, problems with speech.   Of note, patient recently hospitalized for hemoptysis in 2019 and found to have ground glass opacities on CT Chest as well as lymphadenopathy and a breast nodule. Patient also found to have +JULI, +dsDNA, +Scleroderma antibodies consistent with possible MCTD. During this hospital course, patient reports severe R mastoid pain w/ associated headache for which she was taking Excedrin. Patient also has a daughter with history of Sarcoidosis.   At the time of my interview, patient endorses no feeling of dizziness and reports she is improved with Meclizine and able to ambulate.     (Stroke only)  NIHSS:   MRS:   ICH:     REVIEW OF SYSTEMS    A 10-system ROS was performed and is negative except for those items noted above and/or in the HPI.    PAST MEDICAL & SURGICAL HISTORY:  Migraine headache  HTN (hypertension)  History of rotator cuff surgery  Delivery with history of     FAMILY HISTORY:  Family history of hypertension  Family history of type 2 diabetes mellitus    SOCIAL HISTORY:   T/E/D:   Occupation:   Lives with:     MEDICATIONS (HOME):  Home Medications:  amLODIPine 10 mg oral tablet: 1 tab(s) orally once a day (18 Sep 2019 04:18)  folic acid 1 mg oral tablet: 1 tab(s) orally once a day (18 Sep 2019 04:18)  labetalol 100 mg oral tablet: 1 tab(s) orally 2 times a day (18 Sep 2019 04:18)    MEDICATIONS  (STANDING):    MEDICATIONS  (PRN):    ALLERGIES/INTOLERANCES:  Allergies  No Known Allergies    Intolerances    VITALS & EXAMINATION:  Vital Signs Last 24 Hrs  T(C): 36.6 (30 Oct 2019 14:07), Max: 36.6 (30 Oct 2019 14:07)  T(F): 97.9 (30 Oct 2019 14:07), Max: 97.9 (30 Oct 2019 14:07)  HR: 94 (30 Oct 2019 14:07) (94 - 94)  BP: 151/98 (30 Oct 2019 14:07) (151/98 - 151/98)  BP(mean): --  RR: 18 (30 Oct 2019 14:07) (18 - 18)  SpO2: 100% (30 Oct 2019 14:07) (100% - 100%)    General:  Constitutional: Female, appears stated age, in no apparent distress including pain  Head: Normocephalic & atraumatic.   Extremities: No cyanosis, clubbing, or edema.    Neurological (>12):  MS: Awake, alert, oriented to person, place, situation, time. Normal affect. Follows all commands.    Language: Speech is clear, fluent with good repetition & comprehension (able to name objects)    CNs: PERRLA (R = 4mm, L = 4mm). VFF. EOMI. +L beating nystagmus on L lateral gaze., no diplopia. V1-3 intact to LT/pinprick, well developed masseter muscles b/l. No facial asymmetry b/l, full eye closure strength b/l. Hearing grossly normal (rubbing fingers) b/l. Symmetric palate elevation in midline. Gag reflex deferred. Head turning & shoulder shrug intact b/l. Tongue midline, normal movements, no atrophy.    Fundoscopic: Sharp discs margins bilaterally.       Motor: Normal muscle bulk & tone. No noticeable tremor. No pronator drift.              Deltoid	Biceps	Triceps	Wrist	Finger ABd	   R	5	5	5	5	5		5 	  L	5	5	5	5	5		5    	H-Flex	H-Ext	K-Flex	K-Ext	D-Flex	P-Flex  R	5	5	5	5	5	5 	   L	5	5	5	5	5	5	     Sensation: Intact to LT/PP/Temp/Vibration/Position b/l throughout.     Cortical: Extinction on DSS (neglect): none    Reflexes:              Biceps(C5)       BR(C6)     Triceps(C7)               Patellar(L4)    Achilles(S1)    Plantar Resp  R	2	          2	             2		        2		    2		Down   L	2	          2	             2		        2		    2		Down     Coordination: intact rapid-alt movements. No dysmetria to FTN/HTS    Gait: Normal Romberg. No postural instability. Normal stance and tandem gait. Normal heel and toe walking     LABORATORY:  CBC                       9.8    6.08  )-----------( 189      ( 30 Oct 2019 18:00 )             29.7     Chem 10-30    141  |  107  |  12  ----------------------------<  86  3.5   |  22  |  1.29    Ca    9.4      30 Oct 2019 18:00    TPro  7.8  /  Alb  4.3  /  TBili  0.4  /  DBili  x   /  AST  21  /  ALT  14  /  AlkPhos  81  10-30    LFTs LIVER FUNCTIONS - ( 30 Oct 2019 18:00 )  Alb: 4.3 g/dL / Pro: 7.8 g/dL / ALK PHOS: 81 u/L / ALT: 14 u/L / AST: 21 u/L / GGT: x           Coagulopathy   Lipid Panel   A1c   Cardiac enzymes     U/A   CSF  Immunological  Other    STUDIES & IMAGING:  Studies (EKG, EEG, EMG, etc):     Radiology (XR, CT, MR, U/S, TTE/OANH):  < from: CT Chest No Cont (19 @ 12:18) >  IMPRESSION: Right upper lobe mixed solid and groundglass patchy   groundglass opacity likely pneumonia. A 1 to 3 month follow-up chest CT   is recommended to ensure resolution and exclude primary lung neoplasm.    Mediastinal and hilar lymph nodes and 3 tiny left lung pulmonary nodules   can be monitored on the follow-up CT.    2 x 1.1 cm left breast nodule. Correlation with mammogram and/or   ultrasound is recommended for complete evaluation.      < end of copied text >

## 2019-10-30 NOTE — ED ADULT TRIAGE NOTE - CHIEF COMPLAINT QUOTE
pt was D/C from [facility] 2 wks ago, PNA coming with increase dizziness since d/c denies cp/SOB/no weakness

## 2019-10-30 NOTE — ED PROVIDER NOTE - PHYSICAL EXAMINATION
Physical Exam:  Gen: NAD, AOx3, non-toxic appearing, able to ambulate without assistance  Head: NCAT  HEENT: EOMI, PEERLA, normal conjunctiva, tongue midline, oral mucosa moist  Lung: CTAB, no respiratory distress, no wheezes/rhonchi/rales B/L, speaking in full sentences  CV: RRR, no murmurs, rubs or gallops  Abd: soft, NT, ND, no guarding, no rigidity, no rebound tenderness, no CVA tenderness   MSK: no visible deformities, ROM normal in UE/LE, no back pain  Neuro: normal FTN, negative Romberg, very ataxic gait, negative [place], no sensory deficits 5/5 strength b/l  Skin: Warm, well perfused, no rash, no leg swelling  Psych: normal affect, calm

## 2019-10-30 NOTE — ED PROVIDER NOTE - ATTENDING CONTRIBUTION TO CARE
49 year old female with ongoing rheum work up presents with ataxia, visual issues. no weakness.  symptoms present for more than 1 week. concern for cva, progression of rheum process, vertigo, MG.  will ct head, hydrate, meclizine, neuro consult, likely cdu

## 2019-10-31 VITALS
DIASTOLIC BLOOD PRESSURE: 57 MMHG | HEART RATE: 75 BPM | SYSTOLIC BLOOD PRESSURE: 109 MMHG | OXYGEN SATURATION: 100 % | TEMPERATURE: 98 F | RESPIRATION RATE: 17 BRPM

## 2019-10-31 LAB — ACID FAST STN SPT: SIGNIFICANT CHANGE UP

## 2019-10-31 PROCEDURE — 99283 EMERGENCY DEPT VISIT LOW MDM: CPT | Mod: GC

## 2019-10-31 PROCEDURE — 70544 MR ANGIOGRAPHY HEAD W/O DYE: CPT | Mod: 26,59

## 2019-10-31 PROCEDURE — 70548 MR ANGIOGRAPHY NECK W/DYE: CPT | Mod: 26

## 2019-10-31 PROCEDURE — 70551 MRI BRAIN STEM W/O DYE: CPT | Mod: 26

## 2019-10-31 PROCEDURE — 99217: CPT

## 2019-10-31 RX ORDER — MECLIZINE HCL 12.5 MG
1 TABLET ORAL
Qty: 30 | Refills: 0
Start: 2019-10-31

## 2019-10-31 RX ADMIN — Medication 100 MILLIGRAM(S): at 07:21

## 2019-10-31 RX ADMIN — SODIUM CHLORIDE 125 MILLILITER(S): 9 INJECTION INTRAMUSCULAR; INTRAVENOUS; SUBCUTANEOUS at 00:33

## 2019-10-31 RX ADMIN — Medication 1 DROP(S): at 03:07

## 2019-10-31 RX ADMIN — AMLODIPINE BESYLATE 10 MILLIGRAM(S): 2.5 TABLET ORAL at 07:21

## 2019-10-31 RX ADMIN — Medication 100 MILLIGRAM(S): at 00:33

## 2019-10-31 RX ADMIN — Medication 1 DROP(S): at 10:28

## 2019-10-31 RX ADMIN — Medication 1 DROP(S): at 07:21

## 2019-10-31 NOTE — ED CDU PROVIDER DISPOSITION NOTE - CLINICAL COURSE
49F p/w dizziness, positional, unsteady gait, which resolved with meclizine.  CTH were neg.  Concern for VBI, MRI ordered. Neuro to see pt.  Normal neuro exam at present in AM; no nystagmus noted and normal gait.  Likely peripheral vertigo.

## 2019-10-31 NOTE — ED CDU PROVIDER DISPOSITION NOTE - PATIENT PORTAL LINK FT
You can access the FollowMyHealth Patient Portal offered by [facility] by registering at the following website: [email or web address]. By joining [facility]’s FollowMyHealth portal, you will also be able to view your health information using other applications (apps) compatible with our system.

## 2019-10-31 NOTE — ED CDU PROVIDER DISPOSITION NOTE - NSFOLLOWUPINSTRUCTIONS_ED_ALL_ED_FT
See your primary care doctor within 24-48 hours. Follow up with a neurologist at [address] [place] [address] ([phone]) for further management of your vertigo and possible vestibular therapy. Take Meclizine 25mg every 8 hours as needed for dizziness. If symptoms worsen or if you lose consciousness, develop vision changes, have extremity numbness/tingling or any other concerns, return to the ER.

## 2019-10-31 NOTE — ED CDU PROVIDER INITIAL DAY NOTE - PROGRESS NOTE DETAILS
Pt reassessed, feeling better today. Has been ambulating around the CDU without assistance. Neuro exam normal. MRI/MRA head/neck negative for any acute findings. Pt reevaluated by neuro as well, state pt can follow up as an outpatient. Will yosef.

## 2019-10-31 NOTE — ED CDU PROVIDER INITIAL DAY NOTE - OBJECTIVE STATEMENT
50 y/o female with pmhx of HTN presents to ED c/o ataxia x 1 week. Pt states she feels off balance. Admits to feeling dizzy upon changing her positions or moving her head. Relief with meclizine in ED. No family hx of CVA. No fever, chills, cp, abd pain, n/v, weakness, numbness, tingling, headache, neck pain, slurred speech, facial droop, incontinence, vision changes.

## 2019-10-31 NOTE — ED CDU PROVIDER INITIAL DAY NOTE - ATTENDING CONTRIBUTION TO CARE
49F p/w dizziness, positional, unsteady gait, which resolved with meclizine.  CTH were neg.  Concern for VBI, MRI ordered. Neuro to see pt.  Normal neuro exam at present in AM; no nystagmus noted and normal gait.  Likely peripheral vertigo.  VS:  unremarkable    GEN - NAD; well appearing; A+O x3   HEAD - NC/AT     ENT - PEERL, EOMI, mucous membranes  moist , no discharge      NECK: Neck supple, non-tender without lymphadenopathy, no masses, no JVD  PULM - CTA b/l,  symmetric breath sounds  COR -  normal heart sounds    ABD - , ND, NT, soft,  BACK - no CVA tenderness, nontender spine     EXTREMS - no edema, no deformity, warm and well perfused    SKIN - no rash or bruising      NEUROLOGIC - alert, CN 2-12 intact, sensation nl, motor no focal deficit.

## 2019-10-31 NOTE — ED CDU PROVIDER INITIAL DAY NOTE - MEDICAL DECISION MAKING DETAILS
48 y/o female with pmhx of HTN presents to ED c/o ataxia x 1 week. Pt states she feels off balance. Admits to feeling dizzy upon changing her positions or moving her head. Relief with meclizine in ED. As per neuro likely peripheral and suggested dc. However placed in CDU for MRI/MRA r/o CVA, tele monitoring.

## 2019-11-02 LAB — ACID FAST STN SPT: SIGNIFICANT CHANGE UP

## 2019-11-06 ENCOUNTER — APPOINTMENT (OUTPATIENT)
Dept: OPHTHALMOLOGY | Facility: CLINIC | Age: 49
End: 2019-11-06
Payer: COMMERCIAL

## 2019-11-06 ENCOUNTER — NON-APPOINTMENT (OUTPATIENT)
Age: 49
End: 2019-11-06

## 2019-11-06 PROCEDURE — 92012 INTRM OPH EXAM EST PATIENT: CPT

## 2019-11-13 ENCOUNTER — APPOINTMENT (OUTPATIENT)
Dept: PULMONOLOGY | Facility: CLINIC | Age: 49
End: 2019-11-13
Payer: COMMERCIAL

## 2019-11-13 ENCOUNTER — TRANSCRIPTION ENCOUNTER (OUTPATIENT)
Age: 49
End: 2019-11-13

## 2019-11-13 VITALS
SYSTOLIC BLOOD PRESSURE: 140 MMHG | OXYGEN SATURATION: 98 % | DIASTOLIC BLOOD PRESSURE: 93 MMHG | HEIGHT: 67.5 IN | TEMPERATURE: 97.5 F | BODY MASS INDEX: 26.37 KG/M2 | RESPIRATION RATE: 16 BRPM | HEART RATE: 88 BPM | WEIGHT: 170 LBS

## 2019-11-13 VITALS — BODY MASS INDEX: 26.37 KG/M2 | HEART RATE: 93 BPM | HEIGHT: 67.5 IN | WEIGHT: 170 LBS | OXYGEN SATURATION: 100 %

## 2019-11-13 DIAGNOSIS — I10 ESSENTIAL (PRIMARY) HYPERTENSION: ICD-10-CM

## 2019-11-13 PROCEDURE — ZZZZZ: CPT

## 2019-11-13 PROCEDURE — 99245 OFF/OP CONSLTJ NEW/EST HI 55: CPT | Mod: 25

## 2019-11-13 PROCEDURE — 94726 PLETHYSMOGRAPHY LUNG VOLUMES: CPT

## 2019-11-13 PROCEDURE — 94060 EVALUATION OF WHEEZING: CPT

## 2019-11-13 PROCEDURE — 94729 DIFFUSING CAPACITY: CPT

## 2019-11-13 NOTE — HISTORY OF PRESENT ILLNESS
[FreeTextEntry1] : 49-year-old woman here for initial office consultation, followup to an admission to [place] [facility] in September.\par \par Prior to late summer, patient had only a known history of hypertension and a history of migraine headaches.\par She started to develop ophthalmologic issues, was being evaluated by ophthalmology, diagnosed with uveitis. While at her ophthalmologist office, she developed cough, with small amounts of hemoptysis. Patient said it was no more than half a teaspoon at most. She was taken to [place] [facility] for further evaluation. Patient also notes the day before this occurred, she had nausea and vomiting, was quite violent vomiting. In the hospital she was noted to have a fever of 100.4 mild leukocytosis of 10.6. CT of her chest showed right upper lobe nodule opacity, mixed solid and groundglass. She had 3 sputum said were negative for AFB. Sputum culture showed some gram-positive cocci in pairs. Patient was treated with ceftriaxone improved, and was discharged. Additionally, she had some acute kidney injury noted on admission, together with uveitis and hemoptysis prompted an evaluation by rheumatology and nephrology as well. She did have serologies are positive, including JULI, dsDNA, scleroderma antibodies, lupus antibodies. Clinically, she has no symptoms.\par \par Patient is now back at work since late September. She has not had any further episodes of hemoptysis. No cough. No shortness of breath. No joint pains, no rashes. She is followed by a rheumatology, on steroid eyedrops, and is going to see uveitis specialist in December.\par \par Patient is a lifelong nonsmoker. 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 She has annual tuberculin skin testing, which are his negative. Quantiferon gold testing was negative in the hospital as well. \par \par Patient also returned to the emergency department with ataxia on October 31. She was diagnosed with vertigo. MRI/MRA negative. She has been on meclizine since. Scheduled to follow up with neurology.

## 2019-11-13 NOTE — DISCUSSION/SUMMARY
[FreeTextEntry1] : Pulmonary function testing is essentially normal, minimal decrease in diffusion capacity.\par \par In summary this is a 49-year-old woman who was admitted to [place] mid-September with mild hemoptysis, associated with a low-grade fever and recent vomiting, right upper lobe nodular opacity on CAT scan. Treated with ceftriaxone for community-acquired pneumonia, gram-positive cocci in the sputum. Has been feeling well since, no recurrence of hemoptysis, no pulmonary symptoms. Patient additionally had a TIA on admission, in addition to recent diagnosis of uveitis. She also has several rheumatologic serologies which are positive for scleroderma, lupus, as well as JULI, though clinically no other symptoms at this time. Creatinine was improving on last labs as well.\par \par I ordered a repeat CT of the chest for followup. If it is now normal, the previous episode can be explained simply by the infectious cause, treated and resolved. If not, we'll consider bronchoscopy. \par \par I also ordered an echocardiogram for further evaluation, she does have years of hypertension, in addition to these positive scleroderma antibodies.\par \par She already received a flu shot.

## 2019-11-13 NOTE — PHYSICAL EXAM
[Normal Appearance] : normal appearance [General Appearance - Well Developed] : well developed [No Deformities] : no deformities [Well Groomed] : well groomed [General Appearance - Well Nourished] : well nourished [Eyelids - No Xanthelasma] : the eyelids demonstrated no xanthelasmas [Normal Conjunctiva] : the conjunctiva exhibited no abnormalities [General Appearance - In No Acute Distress] : no acute distress [Normal Oropharynx] : normal oropharynx [Neck Appearance] : the appearance of the neck was normal [Jugular Venous Distention Increased] : there was no jugular-venous distention [Neck Cervical Mass (___cm)] : no neck mass was observed [Thyroid Diffuse Enlargement] : the thyroid was not enlarged [Heart Sounds] : normal S1 and S2 [Thyroid Nodule] : there were no palpable thyroid nodules [Heart Rate And Rhythm] : heart rate and rhythm were normal [Murmurs] : no murmurs present [Respiration, Rhythm And Depth] : normal respiratory rhythm and effort [Exaggerated Use Of Accessory Muscles For Inspiration] : no accessory muscle use [Auscultation Breath Sounds / Voice Sounds] : lungs were clear to auscultation bilaterally [Abdomen Tenderness] : non-tender [Abdomen Soft] : soft [Abdomen Mass (___ Cm)] : no abdominal mass palpated [Abnormal Walk] : normal gait [Nail Clubbing] : no clubbing of the fingernails [Gait - Sufficient For Exercise Testing] : the gait was sufficient for exercise testing [Petechial Hemorrhages (___cm)] : no petechial hemorrhages [Cyanosis, Localized] : no localized cyanosis [Skin Color & Pigmentation] : normal skin color and pigmentation [Skin Turgor] : normal skin turgor [] : no rash [Sensation] : the sensory exam was normal to light touch and pinprick [Deep Tendon Reflexes (DTR)] : deep tendon reflexes were 2+ and symmetric [No Focal Deficits] : no focal deficits [Impaired Insight] : insight and judgment were intact [Oriented To Time, Place, And Person] : oriented to person, place, and time [Affect] : the affect was normal

## 2019-11-19 ENCOUNTER — FORM ENCOUNTER (OUTPATIENT)
Age: 49
End: 2019-11-19

## 2019-11-20 ENCOUNTER — OUTPATIENT (OUTPATIENT)
Dept: OUTPATIENT SERVICES | Facility: HOSPITAL | Age: 49
LOS: 1 days | End: 2019-11-20
Payer: COMMERCIAL

## 2019-11-20 ENCOUNTER — APPOINTMENT (OUTPATIENT)
Dept: CT IMAGING | Facility: CLINIC | Age: 49
End: 2019-11-20
Payer: COMMERCIAL

## 2019-11-20 DIAGNOSIS — Z98.890 OTHER SPECIFIED POSTPROCEDURAL STATES: Chronic | ICD-10-CM

## 2019-11-20 DIAGNOSIS — O34.21 MATERNAL CARE FOR SCAR FROM PREVIOUS CESAREAN DELIVERY: Chronic | ICD-10-CM

## 2019-11-20 DIAGNOSIS — Z87.09 PERSONAL HISTORY OF OTHER DISEASES OF THE RESPIRATORY SYSTEM: ICD-10-CM

## 2019-11-20 PROCEDURE — 71250 CT THORAX DX C-: CPT

## 2019-11-20 PROCEDURE — 71250 CT THORAX DX C-: CPT | Mod: 26

## 2019-11-22 ENCOUNTER — CHART COPY (OUTPATIENT)
Age: 49
End: 2019-11-22

## 2019-11-27 ENCOUNTER — APPOINTMENT (OUTPATIENT)
Dept: NEUROLOGY | Facility: CLINIC | Age: 49
End: 2019-11-27
Payer: COMMERCIAL

## 2019-11-27 VITALS
OXYGEN SATURATION: 98 % | HEART RATE: 89 BPM | HEIGHT: 67.5 IN | DIASTOLIC BLOOD PRESSURE: 89 MMHG | SYSTOLIC BLOOD PRESSURE: 138 MMHG | BODY MASS INDEX: 26.37 KG/M2 | WEIGHT: 170 LBS

## 2019-11-27 PROCEDURE — 99215 OFFICE O/P EST HI 40 MIN: CPT

## 2019-11-27 RX ORDER — FOLIC ACID 1 MG
1 TABLET ORAL
Refills: 0 | Status: ACTIVE | COMMUNITY

## 2019-11-27 RX ORDER — ERGOCALCIFEROL 1.25 MG/1
1.25 MG CAPSULE, LIQUID FILLED ORAL
Qty: 13 | Refills: 0 | Status: ACTIVE | COMMUNITY
Start: 2019-11-27

## 2019-11-27 RX ORDER — AMLODIPINE BESYLATE 10 MG/1
10 TABLET ORAL DAILY
Qty: 30 | Refills: 0 | Status: ACTIVE | COMMUNITY

## 2019-12-03 ENCOUNTER — APPOINTMENT (OUTPATIENT)
Dept: RHEUMATOLOGY | Facility: CLINIC | Age: 49
End: 2019-12-03

## 2019-12-04 ENCOUNTER — OUTPATIENT (OUTPATIENT)
Dept: OUTPATIENT SERVICES | Facility: HOSPITAL | Age: 49
LOS: 1 days | End: 2019-12-04
Payer: COMMERCIAL

## 2019-12-04 ENCOUNTER — APPOINTMENT (OUTPATIENT)
Dept: RADIOLOGY | Facility: IMAGING CENTER | Age: 49
End: 2019-12-04
Payer: COMMERCIAL

## 2019-12-04 DIAGNOSIS — O34.21 MATERNAL CARE FOR SCAR FROM PREVIOUS CESAREAN DELIVERY: Chronic | ICD-10-CM

## 2019-12-04 DIAGNOSIS — Z98.890 OTHER SPECIFIED POSTPROCEDURAL STATES: Chronic | ICD-10-CM

## 2019-12-04 DIAGNOSIS — Z15.89 GENETIC SUSCEPTIBILITY TO OTHER DISEASE: ICD-10-CM

## 2019-12-04 PROCEDURE — 72100 X-RAY EXAM L-S SPINE 2/3 VWS: CPT | Mod: 26

## 2019-12-04 PROCEDURE — 72190 X-RAY EXAM OF PELVIS: CPT

## 2019-12-04 PROCEDURE — 72190 X-RAY EXAM OF PELVIS: CPT | Mod: 26

## 2019-12-04 PROCEDURE — 72100 X-RAY EXAM L-S SPINE 2/3 VWS: CPT

## 2019-12-05 ENCOUNTER — TRANSCRIPTION ENCOUNTER (OUTPATIENT)
Age: 49
End: 2019-12-05

## 2019-12-06 ENCOUNTER — APPOINTMENT (OUTPATIENT)
Dept: OPHTHALMOLOGY | Facility: CLINIC | Age: 49
End: 2019-12-06
Payer: COMMERCIAL

## 2019-12-06 ENCOUNTER — NON-APPOINTMENT (OUTPATIENT)
Age: 49
End: 2019-12-06

## 2019-12-06 PROCEDURE — 92014 COMPRE OPH EXAM EST PT 1/>: CPT

## 2019-12-10 ENCOUNTER — APPOINTMENT (OUTPATIENT)
Dept: CARDIOLOGY | Facility: CLINIC | Age: 49
End: 2019-12-10
Payer: COMMERCIAL

## 2019-12-10 PROCEDURE — 93306 TTE W/DOPPLER COMPLETE: CPT

## 2019-12-11 ENCOUNTER — MOBILE ON CALL (OUTPATIENT)
Age: 49
End: 2019-12-11

## 2019-12-20 ENCOUNTER — APPOINTMENT (OUTPATIENT)
Dept: NEPHROLOGY | Facility: CLINIC | Age: 49
End: 2019-12-20

## 2019-12-20 DIAGNOSIS — N17.9 ACUTE KIDNEY FAILURE, UNSPECIFIED: ICD-10-CM

## 2019-12-31 NOTE — PHYSICAL EXAM
[General Appearance - Alert] : alert [General Appearance - Well Nourished] : well nourished [Oriented To Time, Place, And Person] : oriented to person, place, and time [Person] : oriented to person [Place] : oriented to place [Time] : oriented to time [Visual Intact] : visual attention was ~T not ~L decreased [Cranial Nerves Optic (II)] : visual acuity intact bilaterally,  visual fields full to confrontation, pupils equal round and reactive to light [Cranial Nerves Oculomotor (III)] : extraocular motion intact [Cranial Nerves Trigeminal (V)] : facial sensation intact symmetrically [Cranial Nerves Facial (VII)] : face symmetrical [Cranial Nerves Glossopharyngeal (IX)] : tongue and palate midline [Cranial Nerves Accessory (XI - Cranial And Spinal)] : head turning and shoulder shrug symmetric [Cranial Nerves Hypoglossal (XII)] : there was no tongue deviation with protrusion [Motor Strength] : muscle strength was normal in all four extremities [No Muscle Atrophy] : normal bulk in all four extremities [Motor Handedness Right-Handed] : the patient is right hand dominant [Sensation Tactile Decrease] : light touch was intact [Sensation Pain / Temperature Decrease] : pain and temperature was intact [Abnormal Walk] : normal gait [Balance] : balance was intact [1+] : Ankle jerk left 1+ [Sclera] : the sclera and conjunctiva were normal [PERRL With Normal Accommodation] : pupils were equal in size, round, reactive to light, with normal accommodation [Extraocular Movements] : extraocular movements were intact [Optic Disc Abnormality] : the optic disc were normal in size and color [Outer Ear] : the ears and nose were normal in appearance [Oropharynx] : the oropharynx was normal [Neck Appearance] : the appearance of the neck was normal [Auscultation Breath Sounds / Voice Sounds] : lungs were clear to auscultation bilaterally [Heart Rate And Rhythm] : heart rate was normal and rhythm regular [Heart Sounds] : normal S1 and S2 [Arterial Pulses Carotid] : carotid pulses were normal with no bruits [Full Pulse] : the pedal pulses are present [Bowel Sounds] : normal bowel sounds [Abdomen Soft] : soft [Abdomen Tenderness] : non-tender [No CVA Tenderness] : no ~M costovertebral angle tenderness [No Spinal Tenderness] : no spinal tenderness [Nail Clubbing] : no clubbing  or cyanosis of the fingernails [Motor Tone] : muscle strength and tone were normal [Skin Color & Pigmentation] : normal skin color and pigmentation [Skin Turgor] : normal skin turgor [] : no rash [Nystagmus] : ~T no ~M nystagmus was seen [Paresis Pronator Drift Right-Sided] : no pronator drift on the right [Motor Strength Upper Extremities Bilaterally] : strength was normal in both upper extremities [Paresis Pronator Drift Left-Sided] : no pronator drift on the left [Motor Strength Lower Extremities Bilaterally] : strength was normal in both lower extremities [Romberg's Sign] : Romberg's sign was negtive [Past-pointing] : there was no past-pointing [Dysdiadochokinesia Bilaterally] : not present [Tremor] : no tremor present [Coordination - Dysmetria Impaired Finger-to-Nose Bilateral] : not present [Coordination - Dysmetria Impaired Heel-to-Shin Bilateral] : not present [Plantar Reflex Right Only] : normal on the right [Plantar Reflex Left Only] : normal on the left [___] : absent on the right [___] : absent on the left [FreeTextEntry8] : Normal, narrow-based gait. No difficulty with tiptoe and heel gaits. Lists to right when attempting tandem gait.

## 2019-12-31 NOTE — DATA REVIEWED
[de-identified] : CT Head (10/30/19):\par - No acute intracranial abnormality\par \par Audiology testing (11/25/19):\par - Normal to moderate sensorineural hearing loss at right ear\par - Normal hearing at left ear [de-identified] : MRI/MRA/MRV Brain (10/31/19):\par - No acute intracranial structural abnormalities\par - No abnormal vasculature or venous sinus thrombosis

## 2019-12-31 NOTE — ASSESSMENT
[FreeTextEntry1] : 49 RHF with recent diagnosis autoimmune disorder, and recurrent events concerning for b/l benign paroxysmal positional vertigo, in the setting of moderate right-sided sensorineural hearing loss.

## 2019-12-31 NOTE — REASON FOR VISIT
[Initial Eval - Existing Diagnosis] : an initial evaluation of an existing diagnosis [FreeTextEntry1] : Vertigo

## 2019-12-31 NOTE — REVIEW OF SYSTEMS
[As Noted in HPI] : as noted in HPI [Negative] : Heme/Lymph [FreeTextEntry6] : Recent pneumonia in September 2019

## 2019-12-31 NOTE — HISTORY OF PRESENT ILLNESS
[FreeTextEntry1] : This is a 49-year-old right-handed woman who was last seen in the [facility]rology clinic by Dr. Tomas Gaytan on August 24, 2012 for intermittent vertigo and migraines, although migraines have resolved since then. She was diagnosed with an autoimmune disorder (markers positive for mixed connective tissue disorder, systemic lupus erythematosus, and scleroderma) in September 2019, and she presented to the [facility] ED on 10/30/19 with new vertigo episodes, and MRI/MRA/MRV Brain on 10/31/19 revealed no acute abnormalities. Since being discharged, the patient has continued to experience gait unsteadiness and feeling of eye movements. She has not had any falls over the past month. She has seen otolaryngologist, Dr. Alvin Keene, in [place], [place] on November 25, 2019, and she was diagnosed with right ear sensorineural hearing loss and b/l benign paroxysmal vertigo. She is due to undergo new audiology and balance testing.

## 2020-01-03 ENCOUNTER — MOBILE ON CALL (OUTPATIENT)
Age: 50
End: 2020-01-03

## 2020-01-06 ENCOUNTER — MOBILE ON CALL (OUTPATIENT)
Age: 50
End: 2020-01-06

## 2020-01-14 ENCOUNTER — APPOINTMENT (OUTPATIENT)
Dept: MAMMOGRAPHY | Facility: IMAGING CENTER | Age: 50
End: 2020-01-14

## 2020-01-14 ENCOUNTER — APPOINTMENT (OUTPATIENT)
Dept: RHEUMATOLOGY | Facility: CLINIC | Age: 50
End: 2020-01-14
Payer: COMMERCIAL

## 2020-01-14 ENCOUNTER — APPOINTMENT (OUTPATIENT)
Dept: ULTRASOUND IMAGING | Facility: IMAGING CENTER | Age: 50
End: 2020-01-14

## 2020-01-14 VITALS
DIASTOLIC BLOOD PRESSURE: 87 MMHG | HEART RATE: 71 BPM | RESPIRATION RATE: 16 BRPM | BODY MASS INDEX: 28.08 KG/M2 | TEMPERATURE: 97.7 F | OXYGEN SATURATION: 98 % | WEIGHT: 182 LBS | SYSTOLIC BLOOD PRESSURE: 134 MMHG

## 2020-01-14 DIAGNOSIS — R76.0 RAISED ANTIBODY TITER: ICD-10-CM

## 2020-01-14 DIAGNOSIS — M35.9 SYSTEMIC INVOLVEMENT OF CONNECTIVE TISSUE, UNSPECIFIED: ICD-10-CM

## 2020-01-14 DIAGNOSIS — R91.8 OTHER NONSPECIFIC ABNORMAL FINDING OF LUNG FIELD: ICD-10-CM

## 2020-01-14 DIAGNOSIS — Z15.89 GENETIC SUSCEPTIBILITY TO OTHER DISEASE: ICD-10-CM

## 2020-01-14 DIAGNOSIS — R59.0 LOCALIZED ENLARGED LYMPH NODES: ICD-10-CM

## 2020-01-14 DIAGNOSIS — Z00.00 ENCOUNTER FOR GENERAL ADULT MEDICAL EXAMINATION W/OUT ABNORMAL FINDINGS: ICD-10-CM

## 2020-01-14 PROCEDURE — 99214 OFFICE O/P EST MOD 30 MIN: CPT

## 2020-01-22 ENCOUNTER — TRANSCRIPTION ENCOUNTER (OUTPATIENT)
Age: 50
End: 2020-01-22

## 2020-01-22 LAB
ALBUMIN SERPL ELPH-MCNC: 4.6 G/DL
ALP BLD-CCNC: 87 U/L
ALT SERPL-CCNC: 22 U/L
ANION GAP SERPL CALC-SCNC: 14 MMOL/L
APPEARANCE: CLEAR
AST SERPL-CCNC: 22 U/L
B2 GLYCOPROT1 IGA SERPL IA-ACNC: <5 SAU
B2 GLYCOPROT1 IGG SER-ACNC: <5 SGU
B2 GLYCOPROT1 IGM SER-ACNC: <5 SMU
BACTERIA: NEGATIVE
BASOPHILS # BLD AUTO: 0.06 K/UL
BASOPHILS NFR BLD AUTO: 0.9 %
BILIRUB SERPL-MCNC: 0.3 MG/DL
BILIRUBIN URINE: NEGATIVE
BLOOD URINE: NEGATIVE
BUN SERPL-MCNC: 14 MG/DL
C3 SERPL-MCNC: 149 MG/DL
C4 SERPL-MCNC: 23 MG/DL
CALCIUM SERPL-MCNC: 9.1 MG/DL
CARDIOLIPIN IGM SER-MCNC: 7.3 GPL
CARDIOLIPIN IGM SER-MCNC: 9.3 MPL
CHLORIDE SERPL-SCNC: 105 MMOL/L
CO2 SERPL-SCNC: 22 MMOL/L
COLOR: NORMAL
CONFIRM: 31.7 SEC
CREAT SERPL-MCNC: 1.16 MG/DL
CREAT SPEC-SCNC: 93 MG/DL
CREAT/PROT UR: 0.1 RATIO
DRVVT IMM 1:2 NP PPP: NORMAL
DRVVT SCREEN TO CONFIRM RATIO: 1.15 RATIO
DSDNA AB SER-ACNC: 25 IU/ML
EOSINOPHIL # BLD AUTO: 0.11 K/UL
EOSINOPHIL NFR BLD AUTO: 1.6 %
GLUCOSE QUALITATIVE U: NEGATIVE
GLUCOSE SERPL-MCNC: 86 MG/DL
HCT VFR BLD CALC: 32.7 %
HGB BLD-MCNC: 10.4 G/DL
HISTONE AB SER QL: 2.3 UNITS
HYALINE CASTS: 2 /LPF
IMM GRANULOCYTES NFR BLD AUTO: 0.4 %
KETONES URINE: NEGATIVE
LEUKOCYTE ESTERASE URINE: NEGATIVE
LYMPHOCYTES # BLD AUTO: 1.82 K/UL
LYMPHOCYTES NFR BLD AUTO: 26.6 %
MAN DIFF?: NORMAL
MCHC RBC-ENTMCNC: 28.4 PG
MCHC RBC-ENTMCNC: 31.8 GM/DL
MCV RBC AUTO: 89.3 FL
MICROSCOPIC-UA: NORMAL
MONOCYTES # BLD AUTO: 0.72 K/UL
MONOCYTES NFR BLD AUTO: 10.5 %
NEUTROPHILS # BLD AUTO: 4.1 K/UL
NEUTROPHILS NFR BLD AUTO: 60 %
NITRITE URINE: NEGATIVE
PH URINE: 6
PLATELET # BLD AUTO: 168 K/UL
POTASSIUM SERPL-SCNC: 4 MMOL/L
PROT SERPL-MCNC: 7.2 G/DL
PROT UR-MCNC: 10 MG/DL
PROTEIN URINE: NORMAL
RBC # BLD: 3.66 M/UL
RBC # FLD: 13.2 %
RED BLOOD CELLS URINE: 1 /HPF
SCREEN DRVVT: 40.7 SEC
SILICA CLOTTING TIME INTERPRETATION: ABNORMAL
SILICA CLOTTING TIME S/C: 1.18 RATIO
SODIUM SERPL-SCNC: 141 MMOL/L
SPECIFIC GRAVITY URINE: 1.02
SQUAMOUS EPITHELIAL CELLS: 2 /HPF
UROBILINOGEN URINE: NORMAL
WBC # FLD AUTO: 6.84 K/UL
WHITE BLOOD CELLS URINE: 0 /HPF

## 2020-01-23 LAB — DEPRECATED CARDIOLIPIN IGA SER: 5.1 APL

## 2020-01-27 ENCOUNTER — NON-APPOINTMENT (OUTPATIENT)
Age: 50
End: 2020-01-27

## 2020-01-27 ENCOUNTER — APPOINTMENT (OUTPATIENT)
Dept: OPHTHALMOLOGY | Facility: CLINIC | Age: 50
End: 2020-01-27
Payer: COMMERCIAL

## 2020-01-27 PROCEDURE — 76514 ECHO EXAM OF EYE THICKNESS: CPT

## 2020-01-27 PROCEDURE — 92012 INTRM OPH EXAM EST PATIENT: CPT

## 2020-01-27 PROCEDURE — 92133 CPTRZD OPH DX IMG PST SGM ON: CPT

## 2020-02-05 NOTE — ED PROVIDER NOTE - OBJECTIVE STATEMENT
48F, h.o HTN, p.w photophobia and eye pain for 2 days. Pain and symptom started on the left and move to the right after a day. Pt was mowing the lawn a day prior to symptom and denied any foreign body hitting her eye. Pt reports rhinorrhea and watery eyes but denied fever, chill, cough, travel, rash, vision change or headache. - - -

## 2020-02-15 ENCOUNTER — APPOINTMENT (OUTPATIENT)
Dept: ULTRASOUND IMAGING | Facility: IMAGING CENTER | Age: 50
End: 2020-02-15
Payer: COMMERCIAL

## 2020-02-15 ENCOUNTER — OUTPATIENT (OUTPATIENT)
Dept: OUTPATIENT SERVICES | Facility: HOSPITAL | Age: 50
LOS: 1 days | End: 2020-02-15
Payer: COMMERCIAL

## 2020-02-15 ENCOUNTER — APPOINTMENT (OUTPATIENT)
Dept: MAMMOGRAPHY | Facility: IMAGING CENTER | Age: 50
End: 2020-02-15
Payer: COMMERCIAL

## 2020-02-15 DIAGNOSIS — Z98.890 OTHER SPECIFIED POSTPROCEDURAL STATES: Chronic | ICD-10-CM

## 2020-02-15 DIAGNOSIS — O34.21 MATERNAL CARE FOR SCAR FROM PREVIOUS CESAREAN DELIVERY: Chronic | ICD-10-CM

## 2020-02-15 DIAGNOSIS — Z00.8 ENCOUNTER FOR OTHER GENERAL EXAMINATION: ICD-10-CM

## 2020-02-15 PROCEDURE — 77067 SCR MAMMO BI INCL CAD: CPT | Mod: 26

## 2020-02-15 PROCEDURE — 77063 BREAST TOMOSYNTHESIS BI: CPT | Mod: 26

## 2020-02-15 PROCEDURE — 76641 ULTRASOUND BREAST COMPLETE: CPT | Mod: 26,50

## 2020-02-15 PROCEDURE — 77067 SCR MAMMO BI INCL CAD: CPT

## 2020-02-15 PROCEDURE — 76641 ULTRASOUND BREAST COMPLETE: CPT

## 2020-02-15 PROCEDURE — 77063 BREAST TOMOSYNTHESIS BI: CPT

## 2020-02-25 ENCOUNTER — FORM ENCOUNTER (OUTPATIENT)
Age: 50
End: 2020-02-25

## 2020-02-25 ENCOUNTER — RESULT REVIEW (OUTPATIENT)
Age: 50
End: 2020-02-25

## 2020-02-26 ENCOUNTER — FORM ENCOUNTER (OUTPATIENT)
Age: 50
End: 2020-02-26

## 2020-02-26 ENCOUNTER — APPOINTMENT (OUTPATIENT)
Dept: OBGYN | Facility: CLINIC | Age: 50
End: 2020-02-26
Payer: COMMERCIAL

## 2020-02-26 PROCEDURE — 99386 PREV VISIT NEW AGE 40-64: CPT

## 2020-02-27 ENCOUNTER — FORM ENCOUNTER (OUTPATIENT)
Age: 50
End: 2020-02-27

## 2020-02-28 ENCOUNTER — APPOINTMENT (OUTPATIENT)
Dept: NEPHROLOGY | Facility: CLINIC | Age: 50
End: 2020-02-28

## 2020-03-03 ENCOUNTER — FORM ENCOUNTER (OUTPATIENT)
Age: 50
End: 2020-03-03

## 2020-03-06 ENCOUNTER — APPOINTMENT (OUTPATIENT)
Dept: NEUROLOGY | Facility: CLINIC | Age: 50
End: 2020-03-06

## 2020-03-14 ENCOUNTER — APPOINTMENT (OUTPATIENT)
Dept: RHEUMATOLOGY | Facility: CLINIC | Age: 50
End: 2020-03-14

## 2020-03-17 NOTE — HISTORY OF PRESENT ILLNESS
[FreeTextEntry1] : 50 yo with recent diagnosis of overlap syndrome, presenting for follow up evaluation.\par \par Initial history:\par ===========\par -Hospitalization at [facility] September 2019\par admitted for hemoptysis, Chest CT scan showed ground glass opacities with hilar/mediastinal LAD. CT findings reviewed with chest radiology and are consistent with an infectious process, seen by pulmonary who agrees that it is consistent with pneumonia. A repeat CT chest in 1-3 months was recommended.\par She was noted to have KAN (Crea 1 increased to 2), normal UA. She was seen by nephrology who attributed it to AIN, no kidney biopsy was pursued.\par Serology was consistent with +JULI, DsDNA 52, +Scl 70. Negative ANCA, RF, CCP, Sm, RNP\par Normal ACE, low Vitamin D\par +HLA B27 \par \par -Family history: daughter with sarcoidosis\par No personal or family history of IBD or psoriasis \par Rheumatologic ROS:\par -no prior episodes of hemoptysis, no SOB or cough\par -no prior episodes of uveitis\par -no back pain or stiffness \par - No alopecia\par - No dry eyes or mouth\par - No oral ulcers\par - + reflux \par - No Raynaud's \par - No rashes or photosensitivity\par - No joint pain or swelling\par - No morning stiffness\par - One miscarriage, no pregnancy complications\par - No history of blood clots\par \par \par Last visit:\par -10/30 presented to ER for dizziness and diagnosed with vertigo\par discharged on meclizine, no improvement\par was seen by a neurologist and ENT\par right ear: decreased hearing \par recommended steroids that she didn't start \par feels significantly better, to start vestibular therapy\par -seen by Dr Goldberg, improved uveitis, stopped prednisone drops\par -told by pulmonary no need for follow up as CT chest showed resolution of opacities\par -told by renal no need for immediate follow up as Crea stabilized\par

## 2020-03-17 NOTE — ASSESSMENT
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 no inflammatory back pain or stiffness, no hx of enthesitis\par -Xray of the L-S spine and SI joints negative for an inflammatory process\par \par \par 4-+Scl 70, +JULI, borderline elevated DsDNA (subsequently normalized) and +LA\par .patient has no features consistent with SLE or Scleroderma at this time- undifferentiated CTD \par .SPEP/UPEP normal \par . Mammogram and Pap smear to be scheduled ( hx of benign breast nodules)\par  Consider GI eval for colonoscopy (anemia)\par \par 5-Bone Health\par Low Vitamin Supplementation sent \par \par 6-Dizziness, hearing loss\par -has a follow up with ENT\par -to start vestibular therapy\par -patient to fax over the records\par \par 7-HCM\par Flu vaccine given December 2019\par \par RTO in 2 months\par Labs today \par \par Patient aware of my assessment and plan, all questions answered.\par \par

## 2020-03-18 ENCOUNTER — TRANSCRIPTION ENCOUNTER (OUTPATIENT)
Age: 50
End: 2020-03-18

## 2020-04-06 ENCOUNTER — FORM ENCOUNTER (OUTPATIENT)
Age: 50
End: 2020-04-06

## 2020-04-27 ENCOUNTER — APPOINTMENT (OUTPATIENT)
Dept: OPHTHALMOLOGY | Facility: CLINIC | Age: 50
End: 2020-04-27

## 2020-07-24 ENCOUNTER — APPOINTMENT (OUTPATIENT)
Dept: NEUROLOGY | Facility: CLINIC | Age: 50
End: 2020-07-24

## 2022-03-08 ENCOUNTER — APPOINTMENT (OUTPATIENT)
Dept: OBGYN | Facility: CLINIC | Age: 52
End: 2022-03-08
Payer: COMMERCIAL

## 2022-03-08 ENCOUNTER — NON-APPOINTMENT (OUTPATIENT)
Age: 52
End: 2022-03-08

## 2022-03-08 VITALS
BODY MASS INDEX: 27.47 KG/M2 | DIASTOLIC BLOOD PRESSURE: 80 MMHG | WEIGHT: 175 LBS | SYSTOLIC BLOOD PRESSURE: 120 MMHG | HEIGHT: 67 IN

## 2022-03-08 DIAGNOSIS — Z78.9 OTHER SPECIFIED HEALTH STATUS: ICD-10-CM

## 2022-03-08 DIAGNOSIS — Z86.018 PERSONAL HISTORY OF OTHER BENIGN NEOPLASM: ICD-10-CM

## 2022-03-08 DIAGNOSIS — Z83.3 FAMILY HISTORY OF DIABETES MELLITUS: ICD-10-CM

## 2022-03-08 DIAGNOSIS — N76.0 ACUTE VAGINITIS: ICD-10-CM

## 2022-03-08 PROCEDURE — 99213 OFFICE O/P EST LOW 20 MIN: CPT

## 2022-03-08 RX ORDER — METRONIDAZOLE 7.5 MG/G
0.75 GEL VAGINAL
Qty: 1 | Refills: 1 | Status: ACTIVE | COMMUNITY
Start: 2022-03-08 | End: 1900-01-01

## 2022-03-08 RX ORDER — FLUCONAZOLE 150 MG/1
150 TABLET ORAL
Qty: 3 | Refills: 0 | Status: ACTIVE | COMMUNITY
Start: 2022-03-08 | End: 1900-01-01

## 2022-03-08 RX ORDER — PSYLLIUM HUSK 0.4 G
CAPSULE ORAL
Refills: 0 | Status: ACTIVE | COMMUNITY

## 2022-03-08 NOTE — PLAN
[FreeTextEntry1] : SHERLYN BAZZI is a 51 year old presenting for follow up.\par Vagintiis\par -exam c/w yeast and BV, rx sent \par -Culture done today \par -rx for fluconazole \par -rx for metronidazole  \par -RTO for annual \par Matilde Barrera MD

## 2022-03-08 NOTE — HISTORY OF PRESENT ILLNESS
[FreeTextEntry1] : SHERLYN BAZZI is a 51 year old presenting for follow up. Pt has hx of yeast infections. Currently c/o itchiness and irritation. states x 2 weeks.

## 2022-03-08 NOTE — END OF VISIT
[FreeTextEntry3] : I, April Vann, acted solely as a scribe for Dr. Matilde Barrear MD., on 03/08/2022.\par \par All medical record entries made by the scribe were at my, Dr. Matilde Barrera MD., direction and personally dictated by me on 03/08/2022. I have personally reviewed the chart and agree that the record accurately reflects my personal performance of the history, physical exam, assessment and plan.\par

## 2022-03-08 NOTE — PHYSICAL EXAM
[Appropriately responsive] : appropriately responsive [Alert] : alert [No Acute Distress] : no acute distress [No Lymphadenopathy] : no lymphadenopathy [Soft] : soft [Non-tender] : non-tender [Non-distended] : non-distended [No HSM] : No HSM [No Lesions] : no lesions [No Mass] : no mass [Oriented x3] : oriented x3 [Labia Majora] : normal [Labia Minora] : normal [Normal] : normal [Uterine Adnexae] : normal [Discharge] : a  ~M vaginal discharge was present [Heavy] : heavy [White] : white [Thick] : thick

## 2022-03-09 LAB
CANDIDA VAG CYTO: DETECTED
G VAGINALIS+PREV SP MTYP VAG QL MICRO: NOT DETECTED
T VAGINALIS VAG QL WET PREP: NOT DETECTED

## 2022-04-09 ENCOUNTER — APPOINTMENT (OUTPATIENT)
Dept: MAMMOGRAPHY | Facility: CLINIC | Age: 52
End: 2022-04-09

## 2022-04-09 ENCOUNTER — APPOINTMENT (OUTPATIENT)
Dept: ULTRASOUND IMAGING | Facility: CLINIC | Age: 52
End: 2022-04-09

## 2022-07-27 ENCOUNTER — APPOINTMENT (OUTPATIENT)
Dept: OBGYN | Facility: CLINIC | Age: 52
End: 2022-07-27

## 2022-09-08 ENCOUNTER — APPOINTMENT (OUTPATIENT)
Dept: ORTHOPEDIC SURGERY | Facility: CLINIC | Age: 52
End: 2022-09-08

## 2022-09-16 NOTE — PROGRESS NOTE ADULT - PROBLEM SELECTOR PLAN 6
[de-identified] : The patient is a well appearing 58 year old F of their stated age.\par Patient ambulates with a antalgic gait.\par Negative straight leg raise bilateral\par \par Effected Knee:  RIGHT 	\par ROM:  0-145 degrees\par Lachman: Negative\par Pivot Shift: Negative\par Anterior Drawer: Negative\par Posterior Drawer / Sag:Negative\par Varus Stress 0 degrees: Stable\par Varus Stress 30 degrees: Stable\par Valgus Stress 0 degrees: Stable\par Valgus Stress 30 degrees: Stable\par Medial Anali: Negative\par Lateral Anali: Negative\par Patella Glide: 2+\par Patella Apprehension: Negative\par Patella Grind: +\par \par Palpation:\par Medial Joint Line: Nontender\par Lateral Joint Line: Nontender\par Medial Collateral Ligament: Nontender\par Lateral Collateral Ligament/PLC: Nontender\par Medial Femoral Condyle: Nontender\par Medial Retinaculum: Nontender\par Distal Femur: Nontender\par Proximal Tibia: Nontender\par Tibial Tubercle: Nontender\par Distal Pole Patella: Nontender\par Quadriceps Tendon: Nontender &  Intact\par Patella Tendon: Nontender &  Intact\par Lateral Patella Facet: Nontender\par Medial Distal Hamstring/PES: Nontender\par Lateral Distal Hamstring: Nontender & Stable\par Iliotibial Band: Nontender\par Medial Patellofemoral Ligament: Nontender\par Adductor: Nontender\par Proximal GSC-Plantaris: Nontender\par Calf: Supple & Nontender\par \par Inspection:\par Deformity: No\par Erythema: No\par Ecchymosis: No\par Abrasions: No\par Effusion: No\par Prepatella Bursitis: No\par Neurologic Exam:\par Sensation L4-S1: Grossly Intact\par \par Motor Exam:\par Quadriceps: 5 out of 5\par Hamstrings: 5 out of 5\par EHL: 5 out of 5\par FHL: 5 out of 5\par TA: 5 out of 5\par GS: 5 out of 5\par Circulatory/Pulses:\par Dorsalis Pedis: 2+\par Posterior Tibialis: 2+\par Additional Pertinent Findings: none \par Contralateral Knee:                           	\par ROM: 0-145 degrees\par Other Pertinent Findings: none \par \par Assessment: The patient is a 58 year old F with right knee PF OA\par \par The patient’s condition is acute\par Documents/Results Reviewed Today: none\par Tests/Studies Independently Interpreted Today: none\par Pertinent findings include: patellofemoral crepitus \par Confounding medical conditions/concerns: none \par \par Plan: Patient received right knee Gel One today. Modify activity as discussed. \par Tests Ordered: none\par Prescription Medications Ordered: none\par Braces/DME Ordered: none\par Activity/Work/Sports Status: retired/disabled\par Additional Instructions: none\par Follow-Up: 6 months\par \par \par Procedure Note: Musculoskeletal Injection\par  \par Diagnosis: Right knee OA\par  \par Procedure: Right knee, superolateral, Gel One \par  \par Indication:  The patient has had persistent pain despite conservative treatment.  Risks, benefits and alternatives to procedure were discussed; all questions were answered to the patient's apparent satisfaction and informed consent obtained.  Consent form was signed and dated today.  The patient denied prior problems with local anesthetics, injectable cortisones, chicken allergy, coagulopathy and no relevant drug or preservative allergies or sensitivities.\par  \par The area of injection was prepared in a sterile fashion.  Prior to injection a 'Time Out' was conducted in accordance with University policy and the site and nature of procedure verified with the patient.\par  \par Procedure:\par  \par The injection and aspiration was carried out utilizing sterile technique from a superolateral arthroscopic portal position with needle placement under ultrasound guidance to improve accuracy and minimize risk to the patient and: \par  \par (X) Diagnositic ultrasound in the long and short axis revealed right knee PF OA\par \par 0cc of clear synovial fluid was aspirated.\par The specimen:\par (X) appeared benign and was discarded\par ( ) was sent for Culture / Cell Count / Crystal analysis / [_].\par  \par Injection into the target area with care taken to aspirate frequently to minimize the risk of intravascular injection was performed with:\par  \par ( ) 1cc of Depomedrol (80mg/ml)\par ( ) 1cc of Dexamethasone (10mg/ml)\par ( ) 1cc of Toradol (30mg/ml)\par ( ) 9cc of 0.5% Bupivacaine\par ( ) 1cc of 1% Lidocaine\par ( ) 5cc of 32mg Zilretta, prepared and diluted per  instructions\par ( ) 2 cc of Hylan G-F 20 (Synvisc) 16mg/2ml\par ( ) 6 cc of Hylan G-F 20 (SynvisoOne) 16mg/2ml\par ( ) 2cc of Euflexxa\par ( ) 2cc of Orthovisc\par (X) 2cc of GelOne\par ( ) 3cc of Durolane (20mg/ml)\par  \par Patient tolerated the procedure well and direct pressure was applied for hemostasis. The patient was reminded of potential post-injection risks including, but not limited to, delayed hypersensitivity reactions and/or infection.  The patient verified that they had the office and the Emergency Room's contact information if any problems should arise.  After several minutes, the patient informed me that they felt fine and was released from the office.\par \par \par The patient's current medication management of their orthopedic diagnosis was reviewed today:\par (1) We discussed a comprehensive treatment plan that included possible pharmaceutical management involving the use of prescription strength medications including but not limited to options such as oral Naprosyn 500mg BID, once daily Meloxicam 15 mg, or 500-650 mg Tylenol versus over the counter oral medications and topical prescription NSAID Pennsaid vs over the counter Voltaren gel.\par (2) There is a moderate risk of morbidity with further treatment, especially from use of prescription strength medications and possible side effects of these medications which include upset stomach with oral medications, skin reactions to topical medications and cardiac/renal issues with long term use.\par (3) I recommended that the patient follow-up with their medical physician to discuss any significant specific potential issues with long term medication use such as interactions with current medications or with exacerbation of underlying medical comorbidities.\par (4) The benefits and risks associated with use of injectable, oral or topical, prescription and over the counter anti-inflammatory medications were discussed with the patient. The patient voiced understanding of the risks including but not limited to bleeding, stroke, kidney dysfunction, heart disease, and were referred to the black box warning label for further information.\par \par I, Vernon Portillo, attest that this documentation has been prepared under the direction and in the presence of Provider Dr. Cisneros\par \par \par The documentation recorded by the scribe accurately reflects the service I personally performed and the decisions made by me.\par The patient was seen by me under the direct supervision of Dr. Michele Cisneros\par \par  BPs in acceptable range.   - Will hold pt's home losartan for now given KAN  - Will hold pt's home amlodipine for now given recent hemoptysis  - C/w labetalol 100 mg bid with hold parameters

## 2022-10-17 ENCOUNTER — APPOINTMENT (OUTPATIENT)
Dept: OBGYN | Facility: CLINIC | Age: 52
End: 2022-10-17

## 2022-10-17 VITALS
WEIGHT: 172 LBS | DIASTOLIC BLOOD PRESSURE: 88 MMHG | HEART RATE: 73 BPM | BODY MASS INDEX: 27 KG/M2 | SYSTOLIC BLOOD PRESSURE: 134 MMHG | HEIGHT: 67 IN

## 2022-10-17 DIAGNOSIS — Z12.39 ENCOUNTER FOR OTHER SCREENING FOR MALIGNANT NEOPLASM OF BREAST: ICD-10-CM

## 2022-10-17 DIAGNOSIS — Z31.41 ENCOUNTER FOR FERTILITY TESTING: ICD-10-CM

## 2022-10-17 DIAGNOSIS — R92.2 INCONCLUSIVE MAMMOGRAM: ICD-10-CM

## 2022-10-17 DIAGNOSIS — D25.9 LEIOMYOMA OF UTERUS, UNSPECIFIED: ICD-10-CM

## 2022-10-17 DIAGNOSIS — Z01.419 ENCOUNTER FOR GYNECOLOGICAL EXAMINATION (GENERAL) (ROUTINE) W/OUT ABNORMAL FINDINGS: ICD-10-CM

## 2022-10-17 DIAGNOSIS — Z11.51 ENCOUNTER FOR SCREENING FOR HUMAN PAPILLOMAVIRUS (HPV): ICD-10-CM

## 2022-10-17 DIAGNOSIS — B36.9 SUPERFICIAL MYCOSIS, UNSPECIFIED: ICD-10-CM

## 2022-10-17 PROCEDURE — 99396 PREV VISIT EST AGE 40-64: CPT

## 2022-10-21 LAB — HPV HIGH+LOW RISK DNA PNL CVX: NOT DETECTED

## 2022-10-25 PROBLEM — B36.9 FUNGAL SKIN INFECTION: Status: ACTIVE | Noted: 2022-10-25

## 2022-10-25 LAB — CYTOLOGY CVX/VAG DOC THIN PREP: NORMAL

## 2022-10-25 RX ORDER — CLOTRIMAZOLE 10 MG/G
1 CREAM TOPICAL 3 TIMES DAILY
Qty: 1 | Refills: 1 | Status: ACTIVE | COMMUNITY
Start: 2022-10-25 | End: 1900-01-01

## 2022-10-25 NOTE — HISTORY OF PRESENT ILLNESS
[Patient reported PAP Smear was normal] : Patient reported PAP Smear was normal [FreeTextEntry1] : 53 yo  10 6 LMP 10/5/22 presents for annual. She is doing well and has no complaints. during menses has labor like pains. feels like it is behind her incision. noted that started around \par wants to know if she can still have children. \par \par after d&c in  periods went from 9 days to 6 days. still has large clots. over the past two years has labor like pain with period. uses hot water bottle for pain. if really bad, takes extra strength tylenol. \par \par had covid in 10/2019, and in 2022\par \par gluteal fold "pimple". now looks black and flat. looks like a ring worm\par \par OB: c/s x2, nvsd x2, terminations >10\par GYN: fibroids\par PMHx: HTN\par PSHx: c/s x2, d+c, orthoscopic surgery left shoulder, left breast bx (fibroadenoma )\par FMHx: DM (father), sarcoidosis (daughter, sister), lymphoma (sister), lung CA (another sister), HTN (both parents)\par Meds: labetalol, amlodipine\par NKDA\par \par \par \par \par \par  [PapSmeardate] : 02/20

## 2022-10-25 NOTE — PLAN
[FreeTextEntry1] : 53 yo  10 6 LMP 10/5/22 for annual.\par \par -Discussed and reviewed importance of monthly BSE\par -Declines STI testing, importance safe sexual practices discussed\par -Pap/HPV test collected and sent at todays visit\par -RX mammo/sono given to pt with locations and instructions\par -Osteoporosis prevention; recc. vitd/Calcium supplementation and WBE to maintain bone density; start DEXA >65\par -f/u PCP for recommended HCM, vaccinations and CA sc\par \par rx pelvic sono \par rx amh, hormones\par rx cream for antifungal\par discussed kegel exercises

## 2022-11-09 ENCOUNTER — APPOINTMENT (OUTPATIENT)
Dept: OPHTHALMOLOGY | Facility: CLINIC | Age: 52
End: 2022-11-09

## 2022-11-29 ENCOUNTER — APPOINTMENT (OUTPATIENT)
Dept: OBGYN | Facility: CLINIC | Age: 52
End: 2022-11-29

## 2022-11-30 ENCOUNTER — APPOINTMENT (OUTPATIENT)
Dept: ULTRASOUND IMAGING | Facility: IMAGING CENTER | Age: 52
End: 2022-11-30

## 2023-02-02 ENCOUNTER — APPOINTMENT (OUTPATIENT)
Dept: ORTHOPEDIC SURGERY | Facility: CLINIC | Age: 53
End: 2023-02-02
Payer: OTHER MISCELLANEOUS

## 2023-02-02 VITALS — BODY MASS INDEX: 27.62 KG/M2 | WEIGHT: 176 LBS | HEIGHT: 67 IN

## 2023-02-02 PROCEDURE — 99072 ADDL SUPL MATRL&STAF TM PHE: CPT

## 2023-02-02 PROCEDURE — 73564 X-RAY EXAM KNEE 4 OR MORE: CPT | Mod: RT

## 2023-02-02 PROCEDURE — 20610 DRAIN/INJ JOINT/BURSA W/O US: CPT

## 2023-02-02 PROCEDURE — 99213 OFFICE O/P EST LOW 20 MIN: CPT | Mod: 25

## 2023-02-02 PROCEDURE — J3490M: CUSTOM | Mod: RT

## 2023-02-02 NOTE — PHYSICAL EXAM
[Right] : right knee [NL (0)] : extension 0 degrees [] : ligamentously not stable [TWNoteComboBox7] : flexion 95 degrees

## 2023-02-02 NOTE — HISTORY OF PRESENT ILLNESS
[Gradual] : gradual [8] : 8 [6] : 6 [Burning] : burning [Dull/Aching] : dull/aching [Tightness] : tightness [Constant] : constant [Household chores] : household chores [Leisure] : leisure [Work] : work [Rest] : rest [Full time] : Work status: full time [de-identified] : WC 08/24/2018\par \par 02/02/2023L returning for knee pain, Reports when she was sitting on a plane she cannot bend her R knee. while at work she cant sit i a bent knee position. C/o of tightness and fullness. Since her last visit she works with [employer], she had to prioritize work. Tylenol. There can be night symptoms. Is working [employer]. PT in the past was helpful, and the steroid injections. \par \par 02/2020: Pt is feeling much better. Pt is going to PT for her right knee and focusing on strength training.\par \par  Feels more stiffness in the right knee which she attributes to cold weather and lapse in PT. She is going to\par do PT. [] : Post Surgical Visit: no [FreeTextEntry1] : right knee [FreeTextEntry5] : pt injured the knee at work, has ongoing pain and weakness in the knee [de-identified] : motion/pressure [de-identified] : 2020 [de-identified] : Dr. Khan [de-identified] : [place] [facility]

## 2023-02-02 NOTE — ASSESSMENT
[FreeTextEntry1] : Last notes: \par xrays of rt knee 9/13/18 shows mild pf compartment djd otherwise wnl.\par 02/13/2020: MRI of rt knee on the chart. Also currently seeing rheum for workup for SLE and Scleroderma.\par \par 02/02/2023: xrays of the right knee reveals mild PF compartment DJD \par offered and Patient elected to proceed with steroid injection for the right knee. \par Apply ice to affected area. Activity modifier as tolerated. Questions addressed.\par Restart PT to improvee mechanics and Motion of the right knee \par If no improvement consider further imaging.\par Prior PT session has improve mechanics and quality of pain in the past. Requesting PT for 2-3x a wk for 4-6 wks for approx 12-15 session. She did 8 Pt session to he best of her knowlegde in the past with good relief. \par OOW until 02/13/23.\par \par \par A Large joint injection was performed of the [RIGHT KNEE]. The indication for this procedure was pain and inflammation, and patient had decreased mobility in the joint. Risks, benefits and alternatives to a steroid injection procedure were discussed; Risks outlined include but are not limited to infection, sepsis, bleeding, scarring, skin discoloration, temporary increase in pain, syncopal episode, failure to resolve symptoms, allergic reaction, symptom recurrence, and elevation of blood sugar in diabetics.. All questions were answered to the patient's apparent satisfaction and informed consent obtained. Prior to injection a 'Time Out' was conducted in accordance with University policy and the site and nature of procedure verified with the patient. \par  \par Procedure: The area of injection was prepared in a sterile fashion. The injection and aspiration was carried out utilizing sterile technique. The site was prepped with alcohol, betadine, ethyl chloride sprayed topically and sterile technique used.\par  \par ( X ) 1cc of Celestone(30mg/ml) \par ( X ) 2cc of 1% Lidocaine \par \par Patient tolerated the procedure well and direct pressure was applied for hemostasis. The patient was reminded of potential post-injection risks including, but not limited to, delayed hypersensitivity reactions and/or infection. Ice tonight to the injection site.\par \par

## 2023-02-09 ENCOUNTER — APPOINTMENT (OUTPATIENT)
Dept: ORTHOPEDIC SURGERY | Facility: CLINIC | Age: 53
End: 2023-02-09

## 2023-02-09 ENCOUNTER — APPOINTMENT (OUTPATIENT)
Dept: ORTHOPEDIC SURGERY | Facility: CLINIC | Age: 53
End: 2023-02-09
Payer: OTHER MISCELLANEOUS

## 2023-02-09 VITALS — BODY MASS INDEX: 28.28 KG/M2 | WEIGHT: 176 LBS | HEIGHT: 66 IN

## 2023-02-09 DIAGNOSIS — M75.82 OTHER SHOULDER LESIONS, LEFT SHOULDER: ICD-10-CM

## 2023-02-09 PROCEDURE — 99214 OFFICE O/P EST MOD 30 MIN: CPT | Mod: ACP

## 2023-02-09 PROCEDURE — 99072 ADDL SUPL MATRL&STAF TM PHE: CPT

## 2023-02-09 PROCEDURE — 73030 X-RAY EXAM OF SHOULDER: CPT | Mod: LT

## 2023-02-09 RX ORDER — MELOXICAM 15 MG/1
15 TABLET ORAL DAILY
Qty: 30 | Refills: 1 | Status: ACTIVE | COMMUNITY
Start: 2023-02-09 | End: 1900-01-01

## 2023-02-09 NOTE — HISTORY OF PRESENT ILLNESS
[Work related] : work related [Gradual] : gradual [Result of repetitive motion] : result of repetitive motion [9] : 9 [8] : 8 [Burning] : burning [Dull/Aching] : dull/aching [Localized] : localized [Throbbing] : throbbing [Tightness] : tightness [Constant] : constant [de-identified] :  5/1815\par \par 2/9/23: Patient presents with exacerbation of left shoulder pain for the past few months. No new injury. Describes lateral shoulder pain that is worse with lifting/driving. Relieved with stretching. Denies radiating pain. She has been using topicals and Aleve with temporary relief. She had been doing well following arthroscopy in 2015. Full duty (was out recently due to her knee)\par  [] : no [FreeTextEntry3] : DOI: 5/18/15  [FreeTextEntry5] : Old injury with surgical correction from 2015\par [facility] INSURANCE [address] [address]\par  DOI: 5/18/15 \par CLM# [id number] WCB# [id number] \par LT .SHLD\par  : ANABELA NIELSON PH: [phone] FX: [phone]  [de-identified] : s/p 7/14/2015

## 2023-02-09 NOTE — ASSESSMENT
[FreeTextEntry1] : 2/9/23: X-rays today - No significant abnormality.\par Pathology and treatment options reviewed.\par Recommend PT.\par Ice and NSAID use discussed.\par Follow up 4-6 weeks.

## 2023-02-09 NOTE — PHYSICAL EXAM
[Left] : left shoulder [5 ___] : forward flexion 5[unfilled]/5 [5___] : internal rotation 5[unfilled]/5 [] : no sensory deficits [TWNoteComboBox7] : active forward flexion 160 degrees [TWNoteComboBox6] : internal rotation L3 [de-identified] : external rotation 70 degrees

## 2023-02-09 NOTE — WORK
[Partial] : partial [Unknown at this time] : : unknown at this time [Patient] : patient [FreeTextEntry1] : fair\par The patient can continue to work.

## 2023-02-28 ENCOUNTER — FORM ENCOUNTER (OUTPATIENT)
Age: 53
End: 2023-02-28

## 2023-03-21 ENCOUNTER — APPOINTMENT (OUTPATIENT)
Dept: ORTHOPEDIC SURGERY | Facility: CLINIC | Age: 53
End: 2023-03-21

## 2023-04-06 ENCOUNTER — NON-APPOINTMENT (OUTPATIENT)
Age: 53
End: 2023-04-06

## 2023-04-06 ENCOUNTER — APPOINTMENT (OUTPATIENT)
Dept: ORTHOPEDIC SURGERY | Facility: CLINIC | Age: 53
End: 2023-04-06
Payer: OTHER MISCELLANEOUS

## 2023-04-06 VITALS — BODY MASS INDEX: 27.32 KG/M2 | WEIGHT: 170 LBS | HEIGHT: 66 IN

## 2023-04-06 DIAGNOSIS — M23.91 UNSPECIFIED INTERNAL DERANGEMENT OF RIGHT KNEE: ICD-10-CM

## 2023-04-06 PROCEDURE — 99213 OFFICE O/P EST LOW 20 MIN: CPT

## 2023-04-06 NOTE — ASSESSMENT
[FreeTextEntry1] : Last notes: \par xrays of rt knee 9/13/18 shows mild pf compartment djd otherwise wnl.\par 02/13/2020: MRI of rt knee on the chart. Also currently seeing rheum for workup for SLE and Scleroderma.\par \par 02/02/2023: xrays of the right knee reveals mild PF compartment DJD \par offered and Patient elected to proceed with steroid injection for the right knee. \par Apply ice to affected area. Activity modifier as tolerated. Questions addressed.\par Restart PT to improvee mechanics and Motion of the right knee \par If no improvement consider further imaging.\par Prior PT session has improve mechanics and quality of pain in the past. Requesting PT for 2-3x a wk for 4-6 wks for approx 12-15 session. She did 8 Pt session to he best of her knowlegde in the past with good relief. \par OOW until 02/13/23.\par \par 4/6/23: Treatment options reviewed.\par Will start PT. \par May need to consider visco.\par Discussed compression sleeve.\par Return in 4-6 weeks.

## 2023-04-06 NOTE — DISCUSSION/SUMMARY
[de-identified] : Progress note completed by LUGII Brumfield under the direct supervision of Bora Khan M.D.\par

## 2023-04-06 NOTE — HISTORY OF PRESENT ILLNESS
[Work related] : work related [Gradual] : gradual [Result of repetitive motion] : result of repetitive motion [9] : 9 [7] : 7 [Burning] : burning [Dull/Aching] : dull/aching [Tightness] : tightness [Constant] : constant [Household chores] : household chores [Leisure] : leisure [Work] : work [Rest] : rest [Full time] : Work status: full time [de-identified] : WC 08/24/2018\par \par 04/03/23: Follow up right knee. Notes improvement with CSI at last visit, but stiffness/aching has started to return. PT approved, but awaiting scheduling.\par \par 02/02/2023L returning for knee pain, Reports when she was sitting on a plane she cannot bend her R knee. while at work she cant sit i a bent knee position. C/o of tightness and fullness. Since her last visit she works with [employer], she had to prioritize work. Tylenol. There can be night symptoms. Is working [employer]. PT in the past was helpful, and the steroid injections. \par \par 02/2020: Pt is feeling much better. Pt is going to PT for her right knee and focusing on strength training.\par \par  Feels more stiffness in the right knee which she attributes to cold weather and lapse in PT. She is going to\par do PT. [] : Post Surgical Visit: no [FreeTextEntry1] : right knee [FreeTextEntry3] : 8/24/2018 [FreeTextEntry5] : pt injured the knee at work, has ongoing pain and weakness in the knee [FreeTextEntry6] : swelling when she is seating  [de-identified] : motion/pressure [de-identified] : 2020 [de-identified] : Dr. Khan [de-identified] : Rest,  [de-identified] : [place] [employer] [facility]

## 2023-05-18 ENCOUNTER — APPOINTMENT (OUTPATIENT)
Dept: ORTHOPEDIC SURGERY | Facility: CLINIC | Age: 53
End: 2023-05-18

## 2023-07-13 ENCOUNTER — APPOINTMENT (OUTPATIENT)
Dept: MAMMOGRAPHY | Facility: CLINIC | Age: 53
End: 2023-07-13

## 2023-07-13 ENCOUNTER — APPOINTMENT (OUTPATIENT)
Dept: ULTRASOUND IMAGING | Facility: CLINIC | Age: 53
End: 2023-07-13

## 2023-10-11 NOTE — ED ADULT TRIAGE NOTE - RESPIRATORY RATE (BREATHS/MIN)
18 [[id number]] [[id number]],[UNKNOWN],[[id number]] [[id number]],[[id number]],[UNKNOWN],[[id number]]

## 2023-11-03 ENCOUNTER — RESULT REVIEW (OUTPATIENT)
Age: 53
End: 2023-11-03

## 2023-11-03 ENCOUNTER — APPOINTMENT (OUTPATIENT)
Dept: ULTRASOUND IMAGING | Facility: IMAGING CENTER | Age: 53
End: 2023-11-03

## 2023-11-03 ENCOUNTER — OUTPATIENT (OUTPATIENT)
Dept: OUTPATIENT SERVICES | Facility: HOSPITAL | Age: 53
LOS: 1 days | End: 2023-11-03
Payer: COMMERCIAL

## 2023-11-03 ENCOUNTER — APPOINTMENT (OUTPATIENT)
Dept: MAMMOGRAPHY | Facility: IMAGING CENTER | Age: 53
End: 2023-11-03

## 2023-11-03 ENCOUNTER — APPOINTMENT (OUTPATIENT)
Dept: ULTRASOUND IMAGING | Facility: IMAGING CENTER | Age: 53
End: 2023-11-03
Payer: COMMERCIAL

## 2023-11-03 DIAGNOSIS — R92.2 INCONCLUSIVE MAMMOGRAM: ICD-10-CM

## 2023-11-03 DIAGNOSIS — D25.9 LEIOMYOMA OF UTERUS, UNSPECIFIED: ICD-10-CM

## 2023-11-03 DIAGNOSIS — O34.21 MATERNAL CARE FOR SCAR FROM PREVIOUS CESAREAN DELIVERY: Chronic | ICD-10-CM

## 2023-11-03 DIAGNOSIS — Z98.890 OTHER SPECIFIED POSTPROCEDURAL STATES: Chronic | ICD-10-CM

## 2023-11-03 DIAGNOSIS — Z12.39 ENCOUNTER FOR OTHER SCREENING FOR MALIGNANT NEOPLASM OF BREAST: ICD-10-CM

## 2023-11-03 PROCEDURE — 77067 SCR MAMMO BI INCL CAD: CPT | Mod: 26

## 2023-11-03 PROCEDURE — 77063 BREAST TOMOSYNTHESIS BI: CPT | Mod: 26

## 2023-11-03 PROCEDURE — 76830 TRANSVAGINAL US NON-OB: CPT | Mod: 26

## 2023-11-03 PROCEDURE — 76830 TRANSVAGINAL US NON-OB: CPT

## 2023-11-03 PROCEDURE — 76641 ULTRASOUND BREAST COMPLETE: CPT | Mod: 26,50

## 2023-11-03 PROCEDURE — 76856 US EXAM PELVIC COMPLETE: CPT

## 2023-11-03 PROCEDURE — 77067 SCR MAMMO BI INCL CAD: CPT

## 2023-11-03 PROCEDURE — 77063 BREAST TOMOSYNTHESIS BI: CPT

## 2023-11-03 PROCEDURE — 76856 US EXAM PELVIC COMPLETE: CPT | Mod: 26

## 2023-11-03 PROCEDURE — 76641 ULTRASOUND BREAST COMPLETE: CPT

## 2023-11-14 ENCOUNTER — OUTPATIENT (OUTPATIENT)
Dept: OUTPATIENT SERVICES | Facility: HOSPITAL | Age: 53
LOS: 1 days | End: 2023-11-14
Payer: COMMERCIAL

## 2023-11-14 ENCOUNTER — RESULT REVIEW (OUTPATIENT)
Age: 53
End: 2023-11-14

## 2023-11-14 ENCOUNTER — APPOINTMENT (OUTPATIENT)
Dept: ULTRASOUND IMAGING | Facility: IMAGING CENTER | Age: 53
End: 2023-11-14
Payer: COMMERCIAL

## 2023-11-14 DIAGNOSIS — O34.21 MATERNAL CARE FOR SCAR FROM PREVIOUS CESAREAN DELIVERY: Chronic | ICD-10-CM

## 2023-11-14 DIAGNOSIS — Z00.8 ENCOUNTER FOR OTHER GENERAL EXAMINATION: ICD-10-CM

## 2023-11-14 DIAGNOSIS — Z98.890 OTHER SPECIFIED POSTPROCEDURAL STATES: Chronic | ICD-10-CM

## 2023-11-14 PROCEDURE — 76642 ULTRASOUND BREAST LIMITED: CPT | Mod: 26,RT

## 2023-11-14 PROCEDURE — 76642 ULTRASOUND BREAST LIMITED: CPT

## 2024-01-08 ENCOUNTER — APPOINTMENT (OUTPATIENT)
Dept: OBGYN | Facility: CLINIC | Age: 54
End: 2024-01-08

## 2024-03-20 ENCOUNTER — APPOINTMENT (OUTPATIENT)
Dept: OPHTHALMOLOGY | Facility: CLINIC | Age: 54
End: 2024-03-20
Payer: COMMERCIAL

## 2024-03-20 ENCOUNTER — NON-APPOINTMENT (OUTPATIENT)
Age: 54
End: 2024-03-20

## 2024-03-20 PROCEDURE — 92004 COMPRE OPH EXAM NEW PT 1/>: CPT

## 2024-03-25 ENCOUNTER — APPOINTMENT (OUTPATIENT)
Dept: OPHTHALMOLOGY | Facility: CLINIC | Age: 54
End: 2024-03-25
Payer: COMMERCIAL

## 2024-03-25 ENCOUNTER — NON-APPOINTMENT (OUTPATIENT)
Age: 54
End: 2024-03-25

## 2024-03-25 PROCEDURE — 92083 EXTENDED VISUAL FIELD XM: CPT

## 2024-03-25 PROCEDURE — 92015 DETERMINE REFRACTIVE STATE: CPT

## 2024-03-25 PROCEDURE — 92133 CPTRZD OPH DX IMG PST SGM ON: CPT

## 2024-03-25 PROCEDURE — 99214 OFFICE O/P EST MOD 30 MIN: CPT

## 2024-03-28 NOTE — ED CDU PROVIDER INITIAL DAY NOTE - SKIN, MLM
Population Health Chart Review & Patient Outreach Details      Additional [facility] Health Notes:               Updates Requested / Reviewed:      Updated Care Coordination Note, , and Immunizations Reconciliation Completed or Queried: [place]         Health Maintenance Topics Overdue:      [facility] Score: 1     Eye Exam    Shingles/Zoster Vaccine  RSV Vaccine                  Health Maintenance Topic(s) Outreach Outcomes & Actions Taken:    Eye Exam - Outreach Outcomes & Actions Taken  : Message to Dr Wagner's staff to contact pt to schedule as unable to schedule ophthalmology        
Skin normal color for race, warm, dry and intact. No evidence of rash.

## 2024-10-29 NOTE — PATIENT PROFILE ADULT - FUNCTIONAL SCREEN CURRENT LEVEL: BATHING, MLM
Cardiology Progress Note    Patient Identification:  Name: Natalee Noble  Age: 75 y.o.  Sex: female  :  1949  MRN: [id number]                 Follow Up / Chief Complaint: Hypertension, A-fib, CHF  Chief Complaint   Patient presents with    Weakness - Generalized     Weakness, started this morning, Pt states was unable to get up of commode and slid to the floor.  Pt has been on the floor all morning family just got up up. Having pain to shoulders, neck hips and back  Family states pt is pale,       Interval History: Patient recently was in [facility] with newly diagnosed LV dysfunction was started on guideline directed medical therapy presented to ER with hypotension and recurrent syncopal episodes.    Patient underwent MELISSA on 10/15/2024 that showed EF of 56 to 60% with moderate to severe TR left atrial enlargement and right atrial enlargement.      Patient had Watchman and A-fib ablation on        NP note: Patient seen and examined.  Discussed plans for cardiac cath tomorrow    Electronically signed by GREGORIA Yuen, 10/28/24, 5:03 PM EDT.    Cardiology attending addendum :    I have personally performed a face-to-face diagnostic evaluation, physical exam and reviewed data on this patient.  I have reviewed documentation done by me and nurse practitioner  and corrected as needed.  And agree with the different components of documentation.Greater than 50% of the time spent in the care of this patient was provided by attending consultant/me.     Subjective: Patient seen and examined.  Chart reviewed.  Labs reviewed.  Events noted.  Patient had an episode of hypotension with systolic in the 70s yesterday requiring vasopressors.  Today blood pressure is normal.      Objective:  10/29/2024: Creatinine is 1.14, hemoglobin 8.7    History of present illness:      Natalee Noble is a 75-year-old female with a medical history to include:     Atrial fibrillation  [id number] SCORE   [id number] Score:  6 (10/12/2024  9:08 AM)     Long-term anticoagulation  Intolerant to flecainide and amiodarone in the past  HFrEF  Hypertension  Dyslipidemia  CKD stage III  History of CVA  Anxiety/depression  GERD  Diverticulosis  Stroke     Who presented to [facility] on 10/11/2024 following syncopal episode.  Patient reports that she was recently hospitalized at [facility] for 6 days for respiratory infection, CHF and atrial fibrillation.  She was discharged on Thursday.  Echocardiogram completed 10/5/2024 shows reduced LVEF of 35 to 40%, moderate global hypokinesis of left ventricle and mild to moderate mitral, tricuspid, and pulmonic valve regurgitation.  While hospitalized she was treated with IV antibiotics and started on Entresto, spironolactone, digoxin.  She reports 2 syncopal episodes yesterday.  Associated symptoms of shortness of breath and nausea.  She denies chest pain, palpitations, edema.  Radiology consulted for hypotension and syncope.  Upon admission patient noted to be significantly hypotensive at 69/47.  Pertinent labs include troponin 64, digoxin 1.15 creatinine 1.44, TSH unremarkable.  CXR without evidence of active disease.  Patient follows with Dr. Ordaz for cardiology.  She is also scheduled to see an electrophysiologist in near future to discuss atrial fibrilation ablation.  She reports she is intolerable to amiodarone and flecainide.           Assessment:  :     Dizziness near syncope  Hypotension  Recurrent syncope  Elevated troponin  Chronic HFrEF due to systolic dysfunction from dilated cardiomyopathy  Paroxysmal atrial fibrillation on long-term anticoagulation/Xarelto  Dyslipidemia  CKD  History of CVA        Recommendations / Plan:        Patient says she has recurrent syncope since age 13 with migraine headaches.  Recently was diagnosed with heart failure due to reduced ejection fraction LVEF of 35-40%, was put on guideline directed medical therapy with with Entresto, Aldactone, digoxin, metoprolol  succinate, Farxiga.  Patient's blood pressure was low and was having near syncope and dizziness.  Also has been having nausea.  Patient's medications were discontinued because of the frequent falls patient underwent ablation for A-fib and watchman implantation 10/22/2024.  Was sent on 6 weeks Xarelto and metoprolol.  Presented with fall and syncope.  Patient had an episode of severe hypotension systolic in 77 on 10/28/2024 and patient feeling poorly with nausea.  Was given intravenous Alfred-Synephrine.  10/29/2024 patient's blood pressure is normal.  Patient underwent cardiac cath 10/29/2024 which revealed normal LV systolic function and no obstructive CAD.  Patient previously could have had tachycardia induced cardiomyopathy causing LV dysfunction.  Now with A-fib controlled LV function has normalized.  Patient troponin elevation could have been due to ablation.  Patient hypotension is due to unclear etiology.  Will hold off on antihypertensives  Patient is having episodic hypotension.  Will check a.m. cortisol and ACTH stim test in AM.  Will consider starting her on midodrine.  Will restart NOACs with Xarelto in AM.    Copied text in this portion of the note has been reviewed and is accurate as of 10/29/2024    Past Medical History:  Past Medical History:   Diagnosis Date    Anxiety and depression     Arthritis     Back pain     CRF (chronic renal failure), stage 3 (moderate)     Diverticulosis     Essential hypertension     Family history of colon cancer     Family history of colonic polyps     Fractures     R tibia/fibula; L ankle    GERD (gastroesophageal reflux disease)     History of adenomatous polyp of colon     History of cardiac cath     1/28/19 left main no significant disease. LAD with no significant disease. LCX no significant disease. RCA no significant disease.    History of cardiac monitoring     2/01/19 - ZIO PATCH - Underlying rhythm is sinus at 48-94 bpm. PSVT x23 with fastest 167 bpm, longest 24.4  seconds.   10/05/2018- ZIO PATCH - NSR most of time but did have episode of A fib ranging from .    History of colon polyps     History of echocardiogram     9/2018: Left ventricular systolic function is normal. EF 50%. Left ventricular diastolic dysfunction consistent with ipaired relaxation. Left atrial cavity size is mildly dilated. Mild MR. Mild to moderate TR. There is abnormal thickening noted on the RV that may represent a vegetation-this was discussed with Dr. Hammer and Dr. Reilly. No patent foramen ovale. No further work up needed at this time    Hyperlipidemia     Kidney disorder     Migraine     Migraines     Neck pain     Stroke 2018     Past Surgical History:  Past Surgical History:   Procedure Laterality Date    ABDOMINOPLASTY      ATRIAL APPENDAGE EXCLUSION LEFT WITH TRANSESOPHAGEAL ECHOCARDIOGRAM N/A 10/22/2024    Procedure: Atrial Appendage Occlusion;  Surgeon: Sourav Pompa MD;  Location: [facility] CATH INVASIVE LOCATION;  Service: Cardiovascular;  Laterality: N/A;    BACK SURGERY      L2-5 fusion 2012    CARDIAC ELECTROPHYSIOLOGY PROCEDURE Right 10/22/2024    Procedure: Ablation atrial fibrillation;  Surgeon: Sourav Pompa MD;  Location: [facility] CATH INVASIVE LOCATION;  Service: Cardiovascular;  Laterality: Right;    CHOLECYSTECTOMY      COLONOSCOPY  11/24/2014    Diverticulosis; Repeat 5 years    COLONOSCOPY  11/09/2010    Diverticulosis; Repeat 4 years    COLONOSCOPY  09/18/2007    Dr. Monzon-Normal; Repeat 3 years    COLONOSCOPY  06/02/2005    Dr. Monzon-Diminuitive polypectomy above the cecum; Otherwise normal colonoscopy; Repeat 2 years    COLONOSCOPY N/A 11/01/2019    Diverticulosis in the left colon; Two 5-6mm polyps in the cecum-path shows one tubular adenomatous and on hyperplastic polyp; One 8mm tubular adenomatous polyp in the ascending colon; Two 4-5mm tubular adenomatous polyps in the transverse colon; Repeat 3 years    COLONOSCOPY N/A 6/12/2023    Procedure: COLONOSCOPY  WITH ANESTHESIA;  Surgeon: Susan Lord MD;  Location:  [facility];  Service: Gastroenterology;  Laterality: N/A;  preop hx of polyps   postop; polyps   PCP Woody Valentin MD    CYSTOCELE REPAIR      Cystocele and rectocele repair    ENDOSCOPY  09/28/2012    LA grade C esophagitis; HH    ENDOSCOPY N/A 11/01/2019    Small HH; Non-erosive gastritis-biopsied; Normal examined duodenum    ENDOSCOPY N/A 10/13/2024    Procedure: ESOPHAGOGASTRODUODENOSCOPY WITH BIOPSY X 1 AREA;  Surgeon: Miguel Vega MD;  Location:  [facility];  Service: Gastroenterology;  Laterality: N/A;  Hiatal hernia, esophagitis, gastritis    FIBULA FRACTURE SURGERY      HYSTERECTOMY      NECK SURGERY  2012    ACDF C4-7    ORIF TIBIA FRACTURE Right         Social History:   Social History     Tobacco Use    Smoking status: Never    Smokeless tobacco: Never   Substance Use Topics    Alcohol use: No      Family History:  Family History   Problem Relation Age of Onset    Colon cancer Father 65    Colon polyps Daughter 38    Esophageal cancer Neg Hx     Liver cancer Neg Hx     Liver disease Neg Hx     Rectal cancer Neg Hx     Stomach cancer Neg Hx           Allergies:  Allergies   Allergen Reactions    Benzoin Anaphylaxis and Swelling     States when used on her neck it shut off her airway  Huge abscesses with surgery      Iodine Other (See Comments)     PT UNABLE TO TELL RN ABOUT REACTION AT THIS TIME, BUT FAMILY STATES PT HAD A REACTION TO BEING CLEANSED WITH IODINE IN SURGERY  IOBAN - used on neck, swelled to the point of shutting off airway    Vancomycin Other (See Comments)     Kidney failure  Vancomycin induced acute kidney injury      Amiodarone Other (See Comments)     Tremor- hand and face     Levaquin [Levofloxacin] Unknown (See Comments)     Unknown    Sulfa Antibiotics      Scheduled Meds:  aspirin, 81 mg, Daily  atorvastatin, 80 mg, Daily  cholecalciferol, 1,000 Units, Daily  empagliflozin, 25 mg,  "Daily  escitalopram, 20 mg, Daily  ferric gluconate, 250 mg, Daily  fluconazole, 200 mg, Q24H  hydroxychloroquine, 200 mg, BID  levothyroxine, 100 mcg, Q AM  memantine, 10 mg, Daily  metoprolol succinate XL, 25 mg, BID  pantoprazole, 40 mg, Q AM  senna-docusate sodium, 2 tablet, BID  sodium bicarbonate, 650 mg, BID  sodium chloride, 10 mL, Q12H  sodium chloride, 10 mL, Q12H  topiramate, 200 mg, Daily            Review of Systems:   Review of Systems   Cardiovascular:  Negative for chest pain.   Neurological:  Positive for dizziness and weakness.            Constitutional:  Temp:  [97 °F (36.1 °C)-98.2 °F (36.8 °C)] 98.2 °F (36.8 °C)  Heart Rate:  [54-68] 66  Resp:  [14-18] 18  BP: ()/(52-79) 124/66    Physical Exam   /66   Pulse 66   Temp 98.2 °F (36.8 °C) (Oral)   Resp 18   Ht 165.1 cm (65\")   Wt 78.5 kg (173 lb)   SpO2 94%   BMI 28.79 kg/m²   General:  Appears in no acute distress  Eyes: Sclera is anicteric,  conjunctiva is clear   HEENT:  No JVD. Thyroid not visibly enlarged. No mucosal pallor or cyanosis  Respiratory: Respirations regular and unlabored at rest.  Clear to auscultation  Cardiovascular: S1,S2, irregularly irregular rate  Gastrointestinal: Abdomen nondistended.  Musculoskeletal:  No abnormal movements  Extremities: No digital clubbing or cyanosis  Skin: Color pink.   Neuro: Alert and awake.    INTAKE AND OUTPUT:    Intake/Output Summary (Last 24 hours) at 10/29/2024 0817  Last data filed at 10/29/2024 0600  Gross per 24 hour   Intake 1075 ml   Output 1350 ml   Net -275 ml       Cardiographics  Telemetry: Sinus rhythm    ECG:   ECG 12 Lead ED Triage Standing Order; Chest Pain   Final Result   HEART RATE=64  bpm   RR Cbukgwig=892  ms   NH Interval=62  ms   P Horizontal Axis=-10  deg   P Front Axis=Invalid  deg   QRSD Interval=87  ms   QT Muawxayl=806  ms   IReT=920  ms   QRS Axis=69  deg   T Wave Axis=53  deg   - NORMAL ECG -   Sinus rhythm   When compared with ECG of 23-Oct-2024 " 03:26:00,   Nonspecific significant change   Electronically Signed By: Harry Ortega (Vicente) 2024-10-26 08:09:03   Date and Time of Study:2024-10-25 14:36:42      Telemetry Scan   Final Result      Telemetry Scan   Final Result      Telemetry Scan   Final Result      Telemetry Scan   Final Result      Telemetry Scan   Final Result      Telemetry Scan   Final Result      Telemetry Scan   Final Result      Telemetry Scan   Final Result      Telemetry Scan   Final Result      Telemetry Scan   Final Result      Telemetry Scan   Final Result      Telemetry Scan   Final Result      Telemetry Scan   Final Result      Telemetry Scan   Final Result      Telemetry Scan   Final Result      Telemetry Scan   Final Result      Telemetry Scan   Final Result      Telemetry Scan   Final Result      Telemetry Scan   Final Result        I have personally reviewed EKG    Echocardiogram: Results for orders placed during the hospital encounter of 10/22/24    Intra-Op Structural Heart MELISSA (Cardiology Read)    Interpretation Summary  MELISSA   procedure note    Procedure:  MELISSA    Indication:   A-fib, watchman implantation    Date of procedure  : 10/22/2024    :  Dr. Josemanuel Sheriff    Description of procedure:    Under conscious sedation given by anesthesiology and local anesthesia, a MELISSA probe was advanced into the esophagus and into the stomach 2D, color images were obtained, after a timeout was performed.  Patient underwent watchman implantation with septal puncture done with assistance of MELISSA.  After implantation of Watchman measurements were obtained to look at compression of left atrial appendage occluder device.  Post watchman implantation he was made sure there was no leak on color or clots of the device and device had good compression.  Then repeat sweep of MELISSA was done to make sure there was no pericardial effusion.  Patient tolerated procedure well complications were none.    Complications: none    Results:    Left atrial  "appendage was chicken wing morphology.  Left atrial appendage closure device watchman was seated well.  There was good compression over 15%.  There was no leak around the watchman seen.  No thrombus seen on watchman.  No significant pericardial effusion seen.  Normal LV size and contractility EF of over 55%      Recommendations/plan:    Clinical correlation recommended      Lab Review   I have reviewed the labs  Results from last 7 days   Lab Units 10/26/24  1224 10/25/24  1911 10/25/24  1710   CK TOTAL U/L  --   --  68   HSTROP T ng/L 359* 1,146* 1,352*         Results from last 7 days   Lab Units 10/27/24  0451   SODIUM mmol/L 140   POTASSIUM mmol/L 4.0   BUN mg/dL 12   CREATININE mg/dL 1.08*   CALCIUM mg/dL 8.4*         Results from last 7 days   Lab Units 10/29/24  0418 10/28/24  0613 10/27/24  0451   WBC 10*3/mm3 4.09 6.42 4.22   HEMOGLOBIN g/dL 8.7* 8.7* 8.1*   HEMATOCRIT % 30.2* 29.9* 27.4*   PLATELETS 10*3/mm3 201 181 154     Results from last 7 days   Lab Units 10/29/24  0418 10/28/24  1430 10/28/24  0613 10/27/24  1917 10/27/24  1259   INR   --   --   --   --  1.36*   APTT seconds 43.9* >139.0* 41.5*   < > 36.5*    < > = values in this interval not displayed.       RADIOLOGY:  Imaging Results (Last 24 Hours)       ** No results found for the last 24 hours. **                  )10/29/2024  Josemanuel Sheriff MD      EMR Dragon/Transcription:   \"Dictated utilizing Dragon dictation\".   " 0 = independent

## 2024-12-17 ENCOUNTER — APPOINTMENT (OUTPATIENT)
Dept: ORTHOPEDIC SURGERY | Facility: CLINIC | Age: 54
End: 2024-12-17

## 2024-12-19 ENCOUNTER — APPOINTMENT (OUTPATIENT)
Dept: ORTHOPEDIC SURGERY | Facility: CLINIC | Age: 54
End: 2024-12-19

## 2024-12-27 ENCOUNTER — APPOINTMENT (OUTPATIENT)
Dept: MAMMOGRAPHY | Facility: IMAGING CENTER | Age: 54
End: 2024-12-27

## 2024-12-27 ENCOUNTER — APPOINTMENT (OUTPATIENT)
Dept: ULTRASOUND IMAGING | Facility: IMAGING CENTER | Age: 54
End: 2024-12-27

## 2025-01-18 ENCOUNTER — OUTPATIENT (OUTPATIENT)
Dept: OUTPATIENT SERVICES | Facility: HOSPITAL | Age: 55
LOS: 1 days | End: 2025-01-18
Payer: COMMERCIAL

## 2025-01-18 ENCOUNTER — APPOINTMENT (OUTPATIENT)
Dept: MAMMOGRAPHY | Facility: IMAGING CENTER | Age: 55
End: 2025-01-18
Payer: COMMERCIAL

## 2025-01-18 ENCOUNTER — APPOINTMENT (OUTPATIENT)
Dept: ULTRASOUND IMAGING | Facility: IMAGING CENTER | Age: 55
End: 2025-01-18
Payer: COMMERCIAL

## 2025-01-18 DIAGNOSIS — O34.21 MATERNAL CARE FOR SCAR FROM PREVIOUS CESAREAN DELIVERY: Chronic | ICD-10-CM

## 2025-01-18 DIAGNOSIS — Z98.890 OTHER SPECIFIED POSTPROCEDURAL STATES: Chronic | ICD-10-CM

## 2025-01-18 DIAGNOSIS — Z00.8 ENCOUNTER FOR OTHER GENERAL EXAMINATION: ICD-10-CM

## 2025-01-18 PROCEDURE — 76641 ULTRASOUND BREAST COMPLETE: CPT

## 2025-01-18 PROCEDURE — 77063 BREAST TOMOSYNTHESIS BI: CPT

## 2025-01-18 PROCEDURE — 76641 ULTRASOUND BREAST COMPLETE: CPT | Mod: 26,50

## 2025-01-18 PROCEDURE — 77067 SCR MAMMO BI INCL CAD: CPT | Mod: 26

## 2025-01-18 PROCEDURE — 77067 SCR MAMMO BI INCL CAD: CPT

## 2025-01-18 PROCEDURE — 77063 BREAST TOMOSYNTHESIS BI: CPT | Mod: 26

## 2025-01-23 ENCOUNTER — APPOINTMENT (OUTPATIENT)
Dept: ORTHOPEDIC SURGERY | Facility: CLINIC | Age: 55
End: 2025-01-23
Payer: OTHER MISCELLANEOUS

## 2025-01-23 ENCOUNTER — OUTPATIENT (OUTPATIENT)
Dept: OUTPATIENT SERVICES | Facility: HOSPITAL | Age: 55
LOS: 1 days | End: 2025-01-23
Payer: COMMERCIAL

## 2025-01-23 ENCOUNTER — NON-APPOINTMENT (OUTPATIENT)
Age: 55
End: 2025-01-23

## 2025-01-23 ENCOUNTER — APPOINTMENT (OUTPATIENT)
Dept: ULTRASOUND IMAGING | Facility: IMAGING CENTER | Age: 55
End: 2025-01-23
Payer: COMMERCIAL

## 2025-01-23 DIAGNOSIS — O34.21 MATERNAL CARE FOR SCAR FROM PREVIOUS CESAREAN DELIVERY: Chronic | ICD-10-CM

## 2025-01-23 DIAGNOSIS — M17.11 UNILATERAL PRIMARY OSTEOARTHRITIS, RIGHT KNEE: ICD-10-CM

## 2025-01-23 DIAGNOSIS — D25.9 LEIOMYOMA OF UTERUS, UNSPECIFIED: ICD-10-CM

## 2025-01-23 DIAGNOSIS — Z98.890 OTHER SPECIFIED POSTPROCEDURAL STATES: Chronic | ICD-10-CM

## 2025-01-23 DIAGNOSIS — Z00.00 ENCOUNTER FOR GENERAL ADULT MEDICAL EXAMINATION W/OUT ABNORMAL FINDINGS: ICD-10-CM

## 2025-01-23 DIAGNOSIS — Z00.8 ENCOUNTER FOR OTHER GENERAL EXAMINATION: ICD-10-CM

## 2025-01-23 PROCEDURE — 99213 OFFICE O/P EST LOW 20 MIN: CPT | Mod: 25

## 2025-01-23 PROCEDURE — 76856 US EXAM PELVIC COMPLETE: CPT

## 2025-01-23 PROCEDURE — 20610 DRAIN/INJ JOINT/BURSA W/O US: CPT | Mod: RT

## 2025-01-23 PROCEDURE — 76830 TRANSVAGINAL US NON-OB: CPT | Mod: 26

## 2025-01-23 PROCEDURE — 76856 US EXAM PELVIC COMPLETE: CPT | Mod: 26

## 2025-01-23 PROCEDURE — 73564 X-RAY EXAM KNEE 4 OR MORE: CPT | Mod: RT

## 2025-01-23 PROCEDURE — 76830 TRANSVAGINAL US NON-OB: CPT

## 2025-01-31 ENCOUNTER — NON-APPOINTMENT (OUTPATIENT)
Age: 55
End: 2025-01-31

## 2025-02-04 ENCOUNTER — APPOINTMENT (OUTPATIENT)
Dept: ORTHOPEDIC SURGERY | Facility: CLINIC | Age: 55
End: 2025-02-04

## 2025-02-09 NOTE — DATA REVIEWED
[FreeTextEntry1] : Labs reviewed with patient \par \par +JULI 1:320 H\par elevated DsDNA 59  repeat normal 25\par +anti-histone 2.3\par C3, C4 normal \par +LA\par negative aCL, B2GP\par negative Sm, RNP, SSA/SSB\par negative ANCA\par Negative RF, CCP \par + Scl 70 , negative centromere, RNA polymerase III\par \par Normal ACE, normal Vitamin D 1,25 \par Crea improved 1.2, UA without hematuria/proteinuria \par \par CT Chest speptember 2019\par Right upper lobe mixed solid and groundglass patchy \par groundglass opacity likely pneumonia. A 1 to 3 month follow-up chest CT \par is recommended to ensure resolution and exclude primary lung neoplasm.\par \par Mediastinal and hilar lymph nodes and 3 tiny left lung pulmonary nodules \par can be monitored on the follow-up CT. \par \par CT chest December 2019\par Lungs And Airways: The right upper lobe groundglass a\par are clear. The airways are normal. \par Pleura: No pleural effusion or pneumothorax. \par Mediastinum: The chest lymph nodes are unchanged.\par 
Withheld/Decline to Answer

## 2025-02-12 ENCOUNTER — APPOINTMENT (OUTPATIENT)
Dept: MRI IMAGING | Facility: CLINIC | Age: 55
End: 2025-02-12

## 2025-02-13 ENCOUNTER — APPOINTMENT (OUTPATIENT)
Dept: ORTHOPEDIC SURGERY | Facility: CLINIC | Age: 55
End: 2025-02-13
Payer: OTHER MISCELLANEOUS

## 2025-02-13 ENCOUNTER — NON-APPOINTMENT (OUTPATIENT)
Age: 55
End: 2025-02-13

## 2025-02-13 VITALS — WEIGHT: 170 LBS | HEIGHT: 66 IN | BODY MASS INDEX: 27.32 KG/M2

## 2025-02-13 DIAGNOSIS — M17.11 UNILATERAL PRIMARY OSTEOARTHRITIS, RIGHT KNEE: ICD-10-CM

## 2025-02-13 DIAGNOSIS — M23.91 UNSPECIFIED INTERNAL DERANGEMENT OF RIGHT KNEE: ICD-10-CM

## 2025-02-13 PROCEDURE — 99214 OFFICE O/P EST MOD 30 MIN: CPT

## 2025-02-13 RX ORDER — MELOXICAM 15 MG/1
15 TABLET ORAL
Qty: 30 | Refills: 0 | Status: ACTIVE | COMMUNITY
Start: 2025-02-13 | End: 1900-01-01

## 2025-02-23 ENCOUNTER — EMERGENCY (EMERGENCY)
Facility: HOSPITAL | Age: 55
LOS: 1 days | Discharge: HOME | End: 2025-02-23
Attending: STUDENT IN AN ORGANIZED HEALTH CARE EDUCATION/TRAINING PROGRAM | Admitting: STUDENT IN AN ORGANIZED HEALTH CARE EDUCATION/TRAINING PROGRAM
Payer: COMMERCIAL

## 2025-02-23 VITALS
SYSTOLIC BLOOD PRESSURE: 126 MMHG | WEIGHT: 149.91 LBS | TEMPERATURE: 98 F | HEART RATE: 75 BPM | OXYGEN SATURATION: 97 % | DIASTOLIC BLOOD PRESSURE: 73 MMHG | RESPIRATION RATE: 18 BRPM

## 2025-02-23 VITALS
RESPIRATION RATE: 18 BRPM | DIASTOLIC BLOOD PRESSURE: 83 MMHG | TEMPERATURE: 98 F | SYSTOLIC BLOOD PRESSURE: 136 MMHG | HEART RATE: 71 BPM | OXYGEN SATURATION: 100 %

## 2025-02-23 DIAGNOSIS — Z98.890 OTHER SPECIFIED POSTPROCEDURAL STATES: Chronic | ICD-10-CM

## 2025-02-23 DIAGNOSIS — O34.21 MATERNAL CARE FOR SCAR FROM PREVIOUS CESAREAN DELIVERY: Chronic | ICD-10-CM

## 2025-02-23 LAB
ALBUMIN SERPL ELPH-MCNC: 4.3 G/DL — SIGNIFICANT CHANGE UP (ref 3.3–5)
ALP SERPL-CCNC: 67 U/L — SIGNIFICANT CHANGE UP (ref 40–120)
ALT FLD-CCNC: 13 U/L — SIGNIFICANT CHANGE UP (ref 4–33)
ANION GAP SERPL CALC-SCNC: 12 MMOL/L — SIGNIFICANT CHANGE UP (ref 7–14)
ANISOCYTOSIS BLD QL: SLIGHT — SIGNIFICANT CHANGE UP
AST SERPL-CCNC: 20 U/L — SIGNIFICANT CHANGE UP (ref 4–32)
BASOPHILS # BLD AUTO: 0.05 K/UL — SIGNIFICANT CHANGE UP (ref 0–0.2)
BASOPHILS NFR BLD AUTO: 0.9 % — SIGNIFICANT CHANGE UP (ref 0–2)
BILIRUB SERPL-MCNC: 0.4 MG/DL — SIGNIFICANT CHANGE UP (ref 0.2–1.2)
BUN SERPL-MCNC: 14 MG/DL — SIGNIFICANT CHANGE UP (ref 7–23)
BURR CELLS BLD QL SMEAR: PRESENT — SIGNIFICANT CHANGE UP
CALCIUM SERPL-MCNC: 9.1 MG/DL — SIGNIFICANT CHANGE UP (ref 8.4–10.5)
CHLORIDE SERPL-SCNC: 111 MMOL/L — HIGH (ref 98–107)
CO2 SERPL-SCNC: 17 MMOL/L — LOW (ref 22–31)
CREAT SERPL-MCNC: 1.23 MG/DL — SIGNIFICANT CHANGE UP (ref 0.5–1.3)
EGFR: 53 ML/MIN/1.73M2 — LOW
ELLIPTOCYTES BLD QL SMEAR: SLIGHT — SIGNIFICANT CHANGE UP
EOSINOPHIL # BLD AUTO: 0.07 K/UL — SIGNIFICANT CHANGE UP (ref 0–0.5)
EOSINOPHIL NFR BLD AUTO: 1.3 % — SIGNIFICANT CHANGE UP (ref 0–6)
GIANT PLATELETS BLD QL SMEAR: PRESENT — SIGNIFICANT CHANGE UP
GLUCOSE SERPL-MCNC: 100 MG/DL — HIGH (ref 70–99)
HCT VFR BLD CALC: 31.9 % — LOW (ref 34.5–45)
HGB BLD-MCNC: 10 G/DL — LOW (ref 11.5–15.5)
IANC: 3.57 K/UL — SIGNIFICANT CHANGE UP (ref 1.8–7.4)
IMM GRANULOCYTES NFR BLD AUTO: 0.4 % — SIGNIFICANT CHANGE UP (ref 0–0.9)
LYMPHOCYTES # BLD AUTO: 1.21 K/UL — SIGNIFICANT CHANGE UP (ref 1–3.3)
LYMPHOCYTES # BLD AUTO: 21.9 % — SIGNIFICANT CHANGE UP (ref 13–44)
MANUAL SMEAR VERIFICATION: SIGNIFICANT CHANGE UP
MCHC RBC-ENTMCNC: 25.5 PG — LOW (ref 27–34)
MCHC RBC-ENTMCNC: 31.3 G/DL — LOW (ref 32–36)
MCV RBC AUTO: 81.4 FL — SIGNIFICANT CHANGE UP (ref 80–100)
MONOCYTES # BLD AUTO: 0.61 K/UL — SIGNIFICANT CHANGE UP (ref 0–0.9)
MONOCYTES NFR BLD AUTO: 11 % — SIGNIFICANT CHANGE UP (ref 2–14)
NEUTROPHILS # BLD AUTO: 3.57 K/UL — SIGNIFICANT CHANGE UP (ref 1.8–7.4)
NEUTROPHILS NFR BLD AUTO: 64.5 % — SIGNIFICANT CHANGE UP (ref 43–77)
NRBC # BLD AUTO: 0 K/UL — SIGNIFICANT CHANGE UP (ref 0–0)
NRBC # BLD: 1 /100 WBCS — HIGH (ref 0–0)
NRBC # FLD: 0 K/UL — SIGNIFICANT CHANGE UP (ref 0–0)
NRBC BLD AUTO-RTO: 0 /100 WBCS — SIGNIFICANT CHANGE UP (ref 0–0)
NRBC BLD-RTO: 1 /100 WBCS — HIGH (ref 0–0)
OVALOCYTES BLD QL SMEAR: SLIGHT — SIGNIFICANT CHANGE UP
PLAT MORPH BLD: NORMAL — SIGNIFICANT CHANGE UP
PLATELET # BLD AUTO: 112 K/UL — LOW (ref 150–400)
PLATELET COUNT - ESTIMATE: ABNORMAL
POIKILOCYTOSIS BLD QL AUTO: SLIGHT — SIGNIFICANT CHANGE UP
POLYCHROMASIA BLD QL SMEAR: SLIGHT — SIGNIFICANT CHANGE UP
POTASSIUM SERPL-MCNC: 4.6 MMOL/L — SIGNIFICANT CHANGE UP (ref 3.5–5.3)
POTASSIUM SERPL-SCNC: 4.6 MMOL/L — SIGNIFICANT CHANGE UP (ref 3.5–5.3)
PROT SERPL-MCNC: 7.4 G/DL — SIGNIFICANT CHANGE UP (ref 6–8.3)
RBC # BLD: 3.92 M/UL — SIGNIFICANT CHANGE UP (ref 3.8–5.2)
RBC # FLD: 16.6 % — HIGH (ref 10.3–14.5)
RBC BLD AUTO: ABNORMAL
SCHISTOCYTES BLD QL AUTO: SLIGHT — SIGNIFICANT CHANGE UP
SODIUM SERPL-SCNC: 140 MMOL/L — SIGNIFICANT CHANGE UP (ref 135–145)
WBC # BLD: 5.53 K/UL — SIGNIFICANT CHANGE UP (ref 3.8–10.5)
WBC # FLD AUTO: 5.53 K/UL — SIGNIFICANT CHANGE UP (ref 3.8–10.5)

## 2025-02-23 PROCEDURE — 73030 X-RAY EXAM OF SHOULDER: CPT | Mod: 26,RT

## 2025-02-23 PROCEDURE — 99284 EMERGENCY DEPT VISIT MOD MDM: CPT

## 2025-02-23 RX ORDER — METHYLPREDNISOLONE ACETATE 40 MG/ML
1 VIAL (ML) INJECTION
Qty: 1 | Refills: 1
Start: 2025-02-23 | End: 2025-02-24

## 2025-02-23 RX ORDER — DIAZEPAM 5 MG
1 TABLET ORAL
Qty: 9 | Refills: 0
Start: 2025-02-23 | End: 2025-02-25

## 2025-02-23 NOTE — ED ADULT TRIAGE NOTE - CHIEF COMPLAINT QUOTE
pt states she had a CT scan on 2/12 and since then she has had arm pain and numbness on R arm since 2/13. pt denies chest pain, sob, n/v/d, fever or chills. pt states she had a CT scan on 2/1 and since then she has had arm pain and numbness on R arm since 2/13. pt denies chest pain, sob, n/v/d, fever or chills.

## 2025-02-23 NOTE — ED ADULT NURSE NOTE - CHIEF COMPLAINT QUOTE
pt states she had a CT scan on 2/1 and since then she has had arm pain and numbness on R arm since 2/13. pt denies chest pain, sob, n/v/d, fever or chills.

## 2025-02-23 NOTE — ED PROVIDER NOTE - PHYSICAL EXAMINATION
Constitutional: NAD AAOx3  Eyes: PERRLA EOMI  Head: Normocephalic atraumatic  Mouth: MMM  Cardiac: regular rate   Resp: No respiratory distress.   GI: Abd s/nt/nd  Neuro: CN2-12 intact, strength is 5/5 in all extremities.   Skin: No visible rashes

## 2025-02-23 NOTE — ED PROVIDER NOTE - CLINICAL SUMMARY MEDICAL DECISION MAKING FREE TEXT BOX
Adult female history of hypertension on medication, presents to the ER with about 10 days of right upper extremity pain.  Described as pulling and aching.  Does not recall any heavy lifting.  No new activities.  No falls or trauma.  She is able to move her arm and actually feels better when her shoulder is rotated.  Says the pain is worse when she hangs her arm down against gravity.  It is associated with numbness in the pinky and ring finger as well as half of the middle finger.  She tried Tylenol 500 mg for the pain yesterday it helped a little bit.  Denies neck pain.  Also feels pain right behind her right shoulder blade.  No headache no neck pain no vomiting no fevers no chest pain no shortness of breath no slurred speech no trouble speaking.  Patient describes the pain in her forearm is spasming at times.  Her vitals are normal.  She is neurologically intact ambulatory.  Full range of motion of the right shoulder in all planes.  Able to make a thumbs up sign and A-OK sign with the hand.  Good  strength.  There is sensory deficit in the ulnar distribution of the hand only.  No obvious deformities.  No overlying skin changes.  Strong radial pulse.  Right hand neuro vastly intact.  Plan for shoulder x-ray, labs, anticipate discharge with orthopedic follow-up on a Medrol Dosepak and muscle relaxers.

## 2025-02-23 NOTE — ED PROVIDER NOTE - PATIENT PORTAL LINK FT
You can access the FollowMyHealth Patient Portal offered by [facility] by registering at the following website: [email or web address]. By joining [facility]’s FollowMyHealth portal, you will also be able to view your health information using other applications (apps) compatible with our system.

## 2025-02-23 NOTE — ED PROVIDER NOTE - NSFOLLOWUPINSTRUCTIONS_ED_ALL_ED_FT
1) Follow up with Orthopedics  2) Follow up with PCP  3) Take medrol dose pack exactly as prescribed.   4) Take valium, muscle relaxer, 5mg every 8 hours as needed.     Seek immediate medical assistance for any new or worsening symptoms. If you have issues obtaining follow up, please call: [phone] or [phone]  to obtain a doctor or specialist who takes your insurance in your area.     Please take 600 mg of Ibuprofen (aka Motrin, Advil) and/or 650 mg Acetaminophen (aka Tylenol) every 6 hours, as needed, for mild-moderate pain.

## 2025-02-23 NOTE — ED ADULT NURSE NOTE - OBJECTIVE STATEMENT
Pt received to intake 8 A&Ox4 ambulatory c/o worsening atraumatic R arm pain since 2/13. No obvious deformity. Labs drawn and sent. Pt transported to XR.

## 2025-03-14 ENCOUNTER — APPOINTMENT (OUTPATIENT)
Dept: MRI IMAGING | Facility: CLINIC | Age: 55
End: 2025-03-14

## 2025-03-18 ENCOUNTER — APPOINTMENT (OUTPATIENT)
Dept: MRI IMAGING | Facility: CLINIC | Age: 55
End: 2025-03-18
Payer: OTHER MISCELLANEOUS

## 2025-03-18 PROCEDURE — 73721 MRI JNT OF LWR EXTRE W/O DYE: CPT | Mod: RT

## 2025-03-20 NOTE — ED PROVIDER NOTE - DATE/TIME 2
Detail Level: Zone Photo Preface (Leave Blank If You Do Not Want): Photographs were obtained today 30-Oct-2019 19:10

## 2025-03-27 ENCOUNTER — NON-APPOINTMENT (OUTPATIENT)
Age: 55
End: 2025-03-27

## 2025-03-27 ENCOUNTER — APPOINTMENT (OUTPATIENT)
Dept: ORTHOPEDIC SURGERY | Facility: CLINIC | Age: 55
End: 2025-03-27
Payer: OTHER MISCELLANEOUS

## 2025-03-27 DIAGNOSIS — M17.11 UNILATERAL PRIMARY OSTEOARTHRITIS, RIGHT KNEE: ICD-10-CM

## 2025-03-27 PROCEDURE — 99214 OFFICE O/P EST MOD 30 MIN: CPT | Mod: 25

## 2025-03-27 PROCEDURE — 20610 DRAIN/INJ JOINT/BURSA W/O US: CPT | Mod: RT

## 2025-05-20 ENCOUNTER — APPOINTMENT (OUTPATIENT)
Dept: ORTHOPEDIC SURGERY | Facility: CLINIC | Age: 55
End: 2025-05-20